# Patient Record
Sex: FEMALE | Race: BLACK OR AFRICAN AMERICAN | NOT HISPANIC OR LATINO | Employment: OTHER | RURAL
[De-identification: names, ages, dates, MRNs, and addresses within clinical notes are randomized per-mention and may not be internally consistent; named-entity substitution may affect disease eponyms.]

---

## 2019-08-02 LAB
CHOLEST SERPL-MSCNC: 233 MG/DL (ref 0–200)
HDLC SERPL-MCNC: 68 MG/DL
HEP C VIRUS AB: NONREACTIVE
LDLC SERPL CALC-MCNC: 147 MG/DL
TRIGL SERPL-MCNC: 88 MG/DL

## 2019-09-30 ENCOUNTER — HISTORICAL (OUTPATIENT)
Dept: ADMINISTRATIVE | Facility: HOSPITAL | Age: 69
End: 2019-09-30

## 2019-10-02 LAB
LAB AP GENERAL CAT - HISTORICAL: NORMAL
LAB AP INTERPRETATION/RESULT - HISTORICAL: NEGATIVE
LAB AP SPECIMEN ADEQUACY - HISTORICAL: NORMAL
LAB AP SPECIMEN SUBMITTED - HISTORICAL: NORMAL

## 2020-08-12 ENCOUNTER — HISTORICAL (OUTPATIENT)
Dept: ADMINISTRATIVE | Facility: HOSPITAL | Age: 70
End: 2020-08-12

## 2021-03-03 DIAGNOSIS — Z12.31 SCREENING MAMMOGRAM FOR HIGH-RISK PATIENT: Primary | ICD-10-CM

## 2021-04-20 ENCOUNTER — OFFICE VISIT (OUTPATIENT)
Dept: FAMILY MEDICINE | Facility: CLINIC | Age: 71
End: 2021-04-20
Payer: MEDICARE

## 2021-04-20 VITALS
DIASTOLIC BLOOD PRESSURE: 72 MMHG | TEMPERATURE: 98 F | OXYGEN SATURATION: 94 % | SYSTOLIC BLOOD PRESSURE: 122 MMHG | HEIGHT: 64 IN | WEIGHT: 157 LBS | BODY MASS INDEX: 26.8 KG/M2 | HEART RATE: 83 BPM

## 2021-04-20 DIAGNOSIS — M70.62 TROCHANTERIC BURSITIS OF BOTH HIPS: Primary | ICD-10-CM

## 2021-04-20 DIAGNOSIS — M70.61 TROCHANTERIC BURSITIS OF BOTH HIPS: Primary | ICD-10-CM

## 2021-04-20 DIAGNOSIS — I10 ESSENTIAL HYPERTENSION: ICD-10-CM

## 2021-04-20 PROCEDURE — 99213 OFFICE O/P EST LOW 20 MIN: CPT | Mod: ,,, | Performed by: FAMILY MEDICINE

## 2021-04-20 PROCEDURE — 99213 PR OFFICE/OUTPT VISIT, EST, LEVL III, 20-29 MIN: ICD-10-PCS | Mod: ,,, | Performed by: FAMILY MEDICINE

## 2021-04-20 RX ORDER — AMLODIPINE BESYLATE 10 MG/1
10 TABLET ORAL DAILY
COMMUNITY
Start: 2021-01-26 | End: 2021-04-20 | Stop reason: DRUGHIGH

## 2021-04-20 RX ORDER — CHLORTHALIDONE 25 MG/1
25 TABLET ORAL DAILY
Qty: 30 TABLET | Refills: 1 | Status: SHIPPED | OUTPATIENT
Start: 2021-04-20 | End: 2021-04-20

## 2021-04-20 RX ORDER — AMLODIPINE BESYLATE 5 MG/1
5 TABLET ORAL DAILY
Qty: 30 TABLET | Refills: 1 | Status: SHIPPED | OUTPATIENT
Start: 2021-04-20 | End: 2022-04-20

## 2021-04-20 RX ORDER — CHLORTHALIDONE 25 MG/1
25 TABLET ORAL DAILY
COMMUNITY
Start: 2021-03-09 | End: 2021-04-20 | Stop reason: SDUPTHER

## 2021-04-29 ENCOUNTER — TELEPHONE (OUTPATIENT)
Dept: FAMILY MEDICINE | Facility: CLINIC | Age: 71
End: 2021-04-29

## 2021-05-19 ENCOUNTER — HOSPITAL ENCOUNTER (OUTPATIENT)
Dept: RADIOLOGY | Facility: HOSPITAL | Age: 71
Discharge: HOME | End: 2021-05-19
Attending: ORTHOPAEDIC SURGERY
Payer: MEDICARE

## 2021-05-19 DIAGNOSIS — M25.552 BILATERAL HIP PAIN: ICD-10-CM

## 2021-05-19 DIAGNOSIS — M25.551 BILATERAL HIP PAIN: ICD-10-CM

## 2021-05-19 PROBLEM — M16.0 ARTHRITIS OF BOTH HIPS: Status: ACTIVE | Noted: 2021-05-19

## 2021-05-19 PROCEDURE — 73521 X-RAY EXAM HIPS BI 2 VIEWS: CPT | Mod: TC

## 2021-09-16 ENCOUNTER — PATIENT OUTREACH (OUTPATIENT)
Dept: FAMILY MEDICINE | Facility: CLINIC | Age: 71
End: 2021-09-16

## 2021-09-16 ENCOUNTER — TELEPHONE (OUTPATIENT)
Dept: FAMILY MEDICINE | Facility: CLINIC | Age: 71
End: 2021-09-16

## 2022-03-11 DIAGNOSIS — Z71.89 COMPLEX CARE COORDINATION: ICD-10-CM

## 2022-04-11 PROBLEM — M70.62 TROCHANTERIC BURSITIS OF BOTH HIPS: Status: ACTIVE | Noted: 2022-04-11

## 2022-04-11 PROBLEM — M70.61 TROCHANTERIC BURSITIS OF BOTH HIPS: Status: ACTIVE | Noted: 2022-04-11

## 2022-06-13 ENCOUNTER — OFFICE VISIT (OUTPATIENT)
Dept: FAMILY MEDICINE | Facility: CLINIC | Age: 72
End: 2022-06-13
Payer: COMMERCIAL

## 2022-06-13 VITALS
OXYGEN SATURATION: 99 % | WEIGHT: 146 LBS | SYSTOLIC BLOOD PRESSURE: 180 MMHG | TEMPERATURE: 98 F | DIASTOLIC BLOOD PRESSURE: 90 MMHG | BODY MASS INDEX: 24.92 KG/M2 | HEIGHT: 64 IN | HEART RATE: 81 BPM

## 2022-06-13 DIAGNOSIS — I10 ESSENTIAL HYPERTENSION: Primary | ICD-10-CM

## 2022-06-13 PROCEDURE — 3008F PR BODY MASS INDEX (BMI) DOCUMENTED: ICD-10-PCS | Mod: ,,, | Performed by: FAMILY MEDICINE

## 2022-06-13 PROCEDURE — 3008F BODY MASS INDEX DOCD: CPT | Mod: ,,, | Performed by: FAMILY MEDICINE

## 2022-06-13 PROCEDURE — 3080F DIAST BP >= 90 MM HG: CPT | Mod: ,,, | Performed by: FAMILY MEDICINE

## 2022-06-13 PROCEDURE — 99213 PR OFFICE/OUTPT VISIT, EST, LEVL III, 20-29 MIN: ICD-10-PCS | Mod: ,,, | Performed by: FAMILY MEDICINE

## 2022-06-13 PROCEDURE — 3077F SYST BP >= 140 MM HG: CPT | Mod: ,,, | Performed by: FAMILY MEDICINE

## 2022-06-13 PROCEDURE — 1159F PR MEDICATION LIST DOCUMENTED IN MEDICAL RECORD: ICD-10-PCS | Mod: ,,, | Performed by: FAMILY MEDICINE

## 2022-06-13 PROCEDURE — 3077F PR MOST RECENT SYSTOLIC BLOOD PRESSURE >= 140 MM HG: ICD-10-PCS | Mod: ,,, | Performed by: FAMILY MEDICINE

## 2022-06-13 PROCEDURE — 99213 OFFICE O/P EST LOW 20 MIN: CPT | Mod: ,,, | Performed by: FAMILY MEDICINE

## 2022-06-13 PROCEDURE — 1159F MED LIST DOCD IN RCRD: CPT | Mod: ,,, | Performed by: FAMILY MEDICINE

## 2022-06-13 PROCEDURE — 3080F PR MOST RECENT DIASTOLIC BLOOD PRESSURE >= 90 MM HG: ICD-10-PCS | Mod: ,,, | Performed by: FAMILY MEDICINE

## 2022-06-13 RX ORDER — CHLORTHALIDONE 25 MG/1
25 TABLET ORAL DAILY
Qty: 90 TABLET | Refills: 0 | Status: SHIPPED | OUTPATIENT
Start: 2022-06-13 | End: 2022-10-11 | Stop reason: SDUPTHER

## 2022-06-13 RX ORDER — AMLODIPINE BESYLATE 5 MG/1
5 TABLET ORAL DAILY
Qty: 90 TABLET | Refills: 0 | Status: SHIPPED | OUTPATIENT
Start: 2022-06-13 | End: 2022-10-11 | Stop reason: SDUPTHER

## 2022-06-13 RX ORDER — AMLODIPINE BESYLATE 10 MG/1
10 TABLET ORAL DAILY
Qty: 90 TABLET | Refills: 0 | Status: CANCELLED | OUTPATIENT
Start: 2022-06-13

## 2022-06-13 NOTE — PROGRESS NOTES
[facility]    Chief Complaint      Chief Complaint   Patient presents with    High Blood Pressure       History of Present Illness      Brandee Contreras is a 71 y.o. female with chronic conditions of HTN who presents today for med refills. Pt states she has been out of antihypertensives for several months. Previously prescribed amlodipine 5mg and chlorthalidone 25mg daily. States blood pressure was well controlled on medications. States there was some confusion with her insurance company and changing PCPs and so she never followed up.    Past Medical History:  Past Medical History:   Diagnosis Date    Hypertension        Past Surgical History:   has no past surgical history on file.    Social History:  Social History     Tobacco Use    Smoking status: Never Smoker    Smokeless tobacco: Never Used   Substance Use Topics    Alcohol use: Never    Drug use: Never       I personally reviewed all past medical, surgical, and social.     Review of Systems   Constitutional: Negative for fatigue and fever.   HENT: Negative for ear pain.    Eyes: Negative for pain and visual disturbance.   Respiratory: Negative for chest tightness and shortness of breath.    Cardiovascular: Negative for chest pain and leg swelling.   Gastrointestinal: Negative for abdominal pain.   Genitourinary: Negative for difficulty urinating.   Musculoskeletal: Negative for gait problem and myalgias.   Skin: Negative for rash.   Neurological: Negative for dizziness and light-headedness.   Hematological: Does not bruise/bleed easily.        Medications:  Outpatient Encounter Medications as of 6/13/2022   Medication Sig Dispense Refill    amLODIPine (NORVASC) 5 MG tablet Take 1 tablet (5 mg total) by mouth once daily. 90 tablet 0    chlorthalidone (HYGROTEN) 25 MG Tab Take 1 tablet (25 mg total) by mouth once daily. 90 tablet 0    [DISCONTINUED] amLODIPine (NORVASC) 10 MG tablet TAKE 1 TABLET BY MOUTH EVERY DAY (Patient not taking:  "Reported on 10/4/2021) 90 tablet 0     No facility-administered encounter medications on file as of 6/13/2022.       Allergies:  Review of patient's allergies indicates:  No Known Allergies    Health Maintenance:    There is no immunization history on file for this patient.   Health Maintenance   Topic Date Due    TETANUS VACCINE  Never done    DEXA Scan  Never done    Lipid Panel  08/02/2020    Mammogram  08/12/2021    Hepatitis C Screening  Completed        Physical Exam      Vital Signs  Temp: 98.1 °F (36.7 °C)  Temp src: Oral  Pulse: 81  SpO2: 99 %  BP: (!) 180/90  BP Location: Left arm  Patient Position: Sitting  Height and Weight  Height: 5' 4" (162.6 cm)  Weight: 66.2 kg (146 lb)  BSA (Calculated - sq m): 1.73 sq meters  BMI (Calculated): 25  Weight in (lb) to have BMI = 25: 145.3]    Physical Exam  Constitutional:       Appearance: Normal appearance.   HENT:      Head: Normocephalic and atraumatic.   Eyes:      Extraocular Movements: Extraocular movements intact.      Conjunctiva/sclera: Conjunctivae normal.      Pupils: Pupils are equal, round, and reactive to light.   Cardiovascular:      Rate and Rhythm: Normal rate and regular rhythm.      Pulses: Normal pulses.           Radial pulses are 2+ on the right side and 2+ on the left side.      Heart sounds: Normal heart sounds.   Pulmonary:      Effort: Pulmonary effort is normal.      Breath sounds: Normal breath sounds.   Abdominal:      Palpations: Abdomen is soft.      Tenderness: There is no abdominal tenderness.   Musculoskeletal:         General: Normal range of motion.      Right lower leg: No edema.      Left lower leg: No edema.   Skin:     General: Skin is warm and dry.      Findings: No rash.   Neurological:      General: No focal deficit present.      Mental Status: She is alert. Mental status is at baseline.   Psychiatric:         Mood and Affect: Mood normal.          Laboratory:  CBC:      CMP:        Invalid input(s): " CREATININ  LIPIDS:  Recent Labs   Lab 08/02/19  0000   HDL 68   Cholesterol 233 A   Triglycerides 88   LDL Calculated 147     TSH:      A1C:        Assessment/Plan     Brandee Contreras is a 71 y.o.female with:     1. Essential hypertension  - Will restart amlodipine and chlorthalidone  - Follow up in 1 month to reassess  - Will recheck labs at follow up     Total time spent face-to-face and non-face-to-face coordinating care for this encounter was: 20 min    Chronic conditions status updated as per HPI.  Other than changes above, cont current medications and maintain follow up with specialists.  Return to clinic in 1 month.    Alireza Guillen DO  [facility]

## 2022-07-14 ENCOUNTER — OFFICE VISIT (OUTPATIENT)
Dept: FAMILY MEDICINE | Facility: CLINIC | Age: 72
End: 2022-07-14
Payer: COMMERCIAL

## 2022-07-14 VITALS
BODY MASS INDEX: 25.16 KG/M2 | SYSTOLIC BLOOD PRESSURE: 120 MMHG | WEIGHT: 142 LBS | TEMPERATURE: 98 F | OXYGEN SATURATION: 99 % | HEART RATE: 85 BPM | DIASTOLIC BLOOD PRESSURE: 72 MMHG | HEIGHT: 63 IN

## 2022-07-14 DIAGNOSIS — U07.1 COVID-19: ICD-10-CM

## 2022-07-14 DIAGNOSIS — Z20.822 ENCOUNTER FOR LABORATORY TESTING FOR COVID-19 VIRUS: Primary | ICD-10-CM

## 2022-07-14 DIAGNOSIS — I10 ESSENTIAL HYPERTENSION: ICD-10-CM

## 2022-07-14 LAB
CTP QC/QA: YES
FLUAV AG NPH QL: NEGATIVE
FLUBV AG NPH QL: NEGATIVE
SARS-COV-2 AG RESP QL IA.RAPID: POSITIVE

## 2022-07-14 PROCEDURE — 1160F PR REVIEW ALL MEDS BY PRESCRIBER/CLIN PHARMACIST DOCUMENTED: ICD-10-PCS | Mod: ,,, | Performed by: FAMILY MEDICINE

## 2022-07-14 PROCEDURE — 3078F DIAST BP <80 MM HG: CPT | Mod: ,,, | Performed by: FAMILY MEDICINE

## 2022-07-14 PROCEDURE — 99213 OFFICE O/P EST LOW 20 MIN: CPT | Mod: ,,, | Performed by: FAMILY MEDICINE

## 2022-07-14 PROCEDURE — 3074F SYST BP LT 130 MM HG: CPT | Mod: ,,, | Performed by: FAMILY MEDICINE

## 2022-07-14 PROCEDURE — 99213 PR OFFICE/OUTPT VISIT, EST, LEVL III, 20-29 MIN: ICD-10-PCS | Mod: ,,, | Performed by: FAMILY MEDICINE

## 2022-07-14 PROCEDURE — 3008F PR BODY MASS INDEX (BMI) DOCUMENTED: ICD-10-PCS | Mod: ,,, | Performed by: FAMILY MEDICINE

## 2022-07-14 PROCEDURE — 3008F BODY MASS INDEX DOCD: CPT | Mod: ,,, | Performed by: FAMILY MEDICINE

## 2022-07-14 PROCEDURE — 3078F PR MOST RECENT DIASTOLIC BLOOD PRESSURE < 80 MM HG: ICD-10-PCS | Mod: ,,, | Performed by: FAMILY MEDICINE

## 2022-07-14 PROCEDURE — 3074F PR MOST RECENT SYSTOLIC BLOOD PRESSURE < 130 MM HG: ICD-10-PCS | Mod: ,,, | Performed by: FAMILY MEDICINE

## 2022-07-14 PROCEDURE — 1159F PR MEDICATION LIST DOCUMENTED IN MEDICAL RECORD: ICD-10-PCS | Mod: ,,, | Performed by: FAMILY MEDICINE

## 2022-07-14 PROCEDURE — 1159F MED LIST DOCD IN RCRD: CPT | Mod: ,,, | Performed by: FAMILY MEDICINE

## 2022-07-14 PROCEDURE — 87428 SARSCOV & INF VIR A&B AG IA: CPT | Mod: QW,,, | Performed by: FAMILY MEDICINE

## 2022-07-14 PROCEDURE — 87428 POCT SARS-COV2 (COVID) WITH FLU ANTIGEN: ICD-10-PCS | Mod: QW,,, | Performed by: FAMILY MEDICINE

## 2022-07-14 PROCEDURE — 1160F RVW MEDS BY RX/DR IN RCRD: CPT | Mod: ,,, | Performed by: FAMILY MEDICINE

## 2022-07-14 NOTE — PROGRESS NOTES
[facility]    Chief Complaint    No chief complaint on file.      History of Present Illness      Brandee Contreras is a 71 y.o. female with chronic conditions of HTN and osteoarthritis who presents today for routine follow up. Pt was restarted on antihypertensives last month after being off meds for several months. States she is tolerating w/o adverse side effects.     Today has c/o generalized fatigue and headache for the last 1.5 weeks. States she was having mild cough and congestion which have since resolved.    Past Medical History:  Past Medical History:   Diagnosis Date    Hypertension        Past Surgical History:   has no past surgical history on file.    Social History:  Social History     Tobacco Use    Smoking status: Never Smoker    Smokeless tobacco: Never Used   Substance Use Topics    Alcohol use: Never    Drug use: Never       I personally reviewed all past medical, surgical, and social.     Review of Systems   Constitutional: Positive for fatigue. Negative for fever.   HENT: Negative for ear pain.    Eyes: Negative for pain and visual disturbance.   Respiratory: Negative for chest tightness and shortness of breath.    Cardiovascular: Negative for chest pain and leg swelling.   Gastrointestinal: Negative for abdominal pain.   Genitourinary: Negative for difficulty urinating.   Musculoskeletal: Negative for gait problem and myalgias.   Skin: Negative for rash.   Neurological: Positive for headaches. Negative for dizziness and light-headedness.   Hematological: Does not bruise/bleed easily.        Medications:  Outpatient Encounter Medications as of 7/14/2022   Medication Sig Dispense Refill    amLODIPine (NORVASC) 5 MG tablet Take 1 tablet (5 mg total) by mouth once daily. 90 tablet 0    chlorthalidone (HYGROTEN) 25 MG Tab Take 1 tablet (25 mg total) by mouth once daily. 90 tablet 0     No facility-administered encounter medications on file as of 7/14/2022.       Allergies:  Review  "of patient's allergies indicates:  No Known Allergies    Health Maintenance:    There is no immunization history on file for this patient.   Health Maintenance   Topic Date Due    TETANUS VACCINE  Never done    DEXA Scan  Never done    Lipid Panel  08/02/2020    Mammogram  08/12/2021    Hepatitis C Screening  Completed        Physical Exam      Vital Signs  Temp: 98.2 °F (36.8 °C)  Temp src: Oral  Pulse: 85  SpO2: 99 %  BP: 120/72  BP Location: Left arm  Patient Position: Sitting  Height and Weight  Height: 5' 3" (160 cm)  Weight: 64.4 kg (142 lb)  BSA (Calculated - sq m): 1.69 sq meters  BMI (Calculated): 25.2  Weight in (lb) to have BMI = 25: 140.8]    Physical Exam  Constitutional:       Appearance: Normal appearance.   HENT:      Head: Normocephalic and atraumatic.   Eyes:      Extraocular Movements: Extraocular movements intact.      Conjunctiva/sclera: Conjunctivae normal.      Pupils: Pupils are equal, round, and reactive to light.   Cardiovascular:      Rate and Rhythm: Normal rate and regular rhythm.      Pulses: Normal pulses.           Radial pulses are 2+ on the right side and 2+ on the left side.      Heart sounds: Normal heart sounds.   Pulmonary:      Effort: Pulmonary effort is normal.      Breath sounds: Normal breath sounds.   Abdominal:      Palpations: Abdomen is soft.      Tenderness: There is no abdominal tenderness.   Musculoskeletal:         General: Normal range of motion.      Right lower leg: No edema.      Left lower leg: No edema.   Skin:     General: Skin is warm and dry.      Findings: No rash.   Neurological:      General: No focal deficit present.      Mental Status: She is alert. Mental status is at baseline.   Psychiatric:         Mood and Affect: Mood normal.          Laboratory:  CBC:      CMP:        Invalid input(s): CREATININ  LIPIDS:  Recent Labs   Lab 08/02/19  0000   HDL 68   Cholesterol 233 A   Triglycerides 88   LDL Calculated 147     TSH:      A1C:    "     Assessment/Plan     Brandee Contreras is a 71 y.o.female with:     1. Encounter for laboratory testing for COVID-19 virus  - POCT SARS-COV2 (COVID) with Flu Antigen    2. Essential hypertension  - Improved  - Continue current medications  - Recheck labs at follow up    3. COVID-19  - Covid positive, but outside window of treatment for Paxlovid  - Currently only having mild symptoms  - Continue OTC cough/cold medications     Total time spent face-to-face and non-face-to-face coordinating care for this encounter was: 20 min    Chronic conditions status updated as per HPI.  Other than changes above, cont current medications and maintain follow up with specialists.  Return to clinic in 3 month.    DO [facility]

## 2022-10-11 ENCOUNTER — OFFICE VISIT (OUTPATIENT)
Dept: FAMILY MEDICINE | Facility: CLINIC | Age: 72
End: 2022-10-11
Payer: COMMERCIAL

## 2022-10-11 ENCOUNTER — OFFICE VISIT (OUTPATIENT)
Dept: OBSTETRICS AND GYNECOLOGY | Facility: CLINIC | Age: 72
End: 2022-10-11
Payer: COMMERCIAL

## 2022-10-11 ENCOUNTER — HOSPITAL ENCOUNTER (OUTPATIENT)
Dept: RADIOLOGY | Facility: HOSPITAL | Age: 72
Discharge: HOME | End: 2022-10-11
Attending: OBSTETRICS & GYNECOLOGY
Payer: COMMERCIAL

## 2022-10-11 VITALS
SYSTOLIC BLOOD PRESSURE: 149 MMHG | WEIGHT: 141.38 LBS | HEART RATE: 77 BPM | BODY MASS INDEX: 25.05 KG/M2 | DIASTOLIC BLOOD PRESSURE: 79 MMHG

## 2022-10-11 VITALS
HEIGHT: 64 IN | OXYGEN SATURATION: 99 % | TEMPERATURE: 98 F | BODY MASS INDEX: 24.07 KG/M2 | SYSTOLIC BLOOD PRESSURE: 163 MMHG | HEART RATE: 78 BPM | WEIGHT: 141 LBS | DIASTOLIC BLOOD PRESSURE: 78 MMHG

## 2022-10-11 DIAGNOSIS — Z12.31 SCREENING MAMMOGRAM FOR HIGH-RISK PATIENT: ICD-10-CM

## 2022-10-11 DIAGNOSIS — Z78.0 ASYMPTOMATIC MENOPAUSAL STATE: ICD-10-CM

## 2022-10-11 DIAGNOSIS — I10 HYPERTENSION, UNSPECIFIED TYPE: Primary | ICD-10-CM

## 2022-10-11 DIAGNOSIS — N81.4 UTEROVAGINAL PROLAPSE: Primary | ICD-10-CM

## 2022-10-11 PROCEDURE — 3077F SYST BP >= 140 MM HG: CPT | Mod: ,,, | Performed by: NURSE PRACTITIONER

## 2022-10-11 PROCEDURE — 77067 SCR MAMMO BI INCL CAD: CPT | Mod: TC

## 2022-10-11 PROCEDURE — 3008F PR BODY MASS INDEX (BMI) DOCUMENTED: ICD-10-PCS | Mod: ,,, | Performed by: NURSE PRACTITIONER

## 2022-10-11 PROCEDURE — 3288F PR FALLS RISK ASSESSMENT DOCUMENTED: ICD-10-PCS | Mod: CPTII,,, | Performed by: OBSTETRICS & GYNECOLOGY

## 2022-10-11 PROCEDURE — 1101F PR PT FALLS ASSESS DOC 0-1 FALLS W/OUT INJ PAST YR: ICD-10-PCS | Mod: CPTII,,, | Performed by: OBSTETRICS & GYNECOLOGY

## 2022-10-11 PROCEDURE — 3008F PR BODY MASS INDEX (BMI) DOCUMENTED: ICD-10-PCS | Mod: CPTII,,, | Performed by: OBSTETRICS & GYNECOLOGY

## 2022-10-11 PROCEDURE — 3008F BODY MASS INDEX DOCD: CPT | Mod: CPTII,,, | Performed by: OBSTETRICS & GYNECOLOGY

## 2022-10-11 PROCEDURE — 1159F PR MEDICATION LIST DOCUMENTED IN MEDICAL RECORD: ICD-10-PCS | Mod: ,,, | Performed by: NURSE PRACTITIONER

## 2022-10-11 PROCEDURE — 1160F RVW MEDS BY RX/DR IN RCRD: CPT | Mod: ,,, | Performed by: NURSE PRACTITIONER

## 2022-10-11 PROCEDURE — 99213 OFFICE O/P EST LOW 20 MIN: CPT | Mod: ,,, | Performed by: NURSE PRACTITIONER

## 2022-10-11 PROCEDURE — 3288F FALL RISK ASSESSMENT DOCD: CPT | Mod: CPTII,,, | Performed by: OBSTETRICS & GYNECOLOGY

## 2022-10-11 PROCEDURE — 99213 PR OFFICE/OUTPT VISIT, EST, LEVL III, 20-29 MIN: ICD-10-PCS | Mod: ,,, | Performed by: NURSE PRACTITIONER

## 2022-10-11 PROCEDURE — 1101F PT FALLS ASSESS-DOCD LE1/YR: CPT | Mod: CPTII,,, | Performed by: OBSTETRICS & GYNECOLOGY

## 2022-10-11 PROCEDURE — 3078F PR MOST RECENT DIASTOLIC BLOOD PRESSURE < 80 MM HG: ICD-10-PCS | Mod: CPTII,,, | Performed by: OBSTETRICS & GYNECOLOGY

## 2022-10-11 PROCEDURE — 99203 OFFICE O/P NEW LOW 30 MIN: CPT | Mod: ,,, | Performed by: OBSTETRICS & GYNECOLOGY

## 2022-10-11 PROCEDURE — 3078F PR MOST RECENT DIASTOLIC BLOOD PRESSURE < 80 MM HG: ICD-10-PCS | Mod: ,,, | Performed by: NURSE PRACTITIONER

## 2022-10-11 PROCEDURE — 99203 PR OFFICE/OUTPT VISIT, NEW, LEVL III, 30-44 MIN: ICD-10-PCS | Mod: ,,, | Performed by: OBSTETRICS & GYNECOLOGY

## 2022-10-11 PROCEDURE — 1159F MED LIST DOCD IN RCRD: CPT | Mod: ,,, | Performed by: NURSE PRACTITIONER

## 2022-10-11 PROCEDURE — 3078F DIAST BP <80 MM HG: CPT | Mod: ,,, | Performed by: NURSE PRACTITIONER

## 2022-10-11 PROCEDURE — 3077F PR MOST RECENT SYSTOLIC BLOOD PRESSURE >= 140 MM HG: ICD-10-PCS | Mod: ,,, | Performed by: NURSE PRACTITIONER

## 2022-10-11 PROCEDURE — 3077F PR MOST RECENT SYSTOLIC BLOOD PRESSURE >= 140 MM HG: ICD-10-PCS | Mod: CPTII,,, | Performed by: OBSTETRICS & GYNECOLOGY

## 2022-10-11 PROCEDURE — 3008F BODY MASS INDEX DOCD: CPT | Mod: ,,, | Performed by: NURSE PRACTITIONER

## 2022-10-11 PROCEDURE — 3078F DIAST BP <80 MM HG: CPT | Mod: CPTII,,, | Performed by: OBSTETRICS & GYNECOLOGY

## 2022-10-11 PROCEDURE — 1160F PR REVIEW ALL MEDS BY PRESCRIBER/CLIN PHARMACIST DOCUMENTED: ICD-10-PCS | Mod: ,,, | Performed by: NURSE PRACTITIONER

## 2022-10-11 PROCEDURE — 3077F SYST BP >= 140 MM HG: CPT | Mod: CPTII,,, | Performed by: OBSTETRICS & GYNECOLOGY

## 2022-10-11 RX ORDER — AMLODIPINE BESYLATE 5 MG/1
5 TABLET ORAL DAILY
Qty: 90 TABLET | Refills: 1 | Status: SHIPPED | OUTPATIENT
Start: 2022-10-11 | End: 2023-05-09 | Stop reason: SDUPTHER

## 2022-10-11 RX ORDER — CHLORTHALIDONE 25 MG/1
25 TABLET ORAL DAILY
Qty: 90 TABLET | Refills: 1 | Status: SHIPPED | OUTPATIENT
Start: 2022-10-11 | End: 2023-05-09 | Stop reason: SDUPTHER

## 2022-10-11 NOTE — PROGRESS NOTES
History & Physical    SUBJECTIVE:     History of Present Illness:  Patient is a 71 y.o. female presents with unable to remove and replace pessary device. Pt denies having any bladder issues and denies wanting to come in to have it cleaned. Pt states she has never had a bone density scan, Mammo has been a couple of years ago and Colonoscopy was in 2019.    Chief Complaint   Patient presents with    New Patient     Pt has limited mobility and has a pessary.  She can no longer remove/replace it herself.       Review of patient's allergies indicates:  No Known Allergies    Current Outpatient Medications   Medication Sig Dispense Refill    amLODIPine (NORVASC) 5 MG tablet Take 1 tablet (5 mg total) by mouth once daily. 90 tablet 1    chlorthalidone (HYGROTEN) 25 MG Tab Take 1 tablet (25 mg total) by mouth once daily. 90 tablet 1     No current facility-administered medications for this visit.       Past Medical History:   Diagnosis Date    Hypertension      History reviewed. No pertinent surgical history.  History reviewed. No pertinent family history.  Social History     Tobacco Use    Smoking status: Never    Smokeless tobacco: Never   Substance Use Topics    Alcohol use: Never    Drug use: Never        Review of Systems:  Review of Systems   Constitutional:  Negative for appetite change, chills, fatigue and fever.   HENT: Negative.     Eyes: Negative.    Respiratory:  Negative for cough, chest tightness and shortness of breath.    Cardiovascular:  Negative for chest pain, palpitations and leg swelling.   Gastrointestinal:  Negative for abdominal distention, abdominal pain, blood in stool, constipation, diarrhea, nausea and vomiting.   Endocrine: Negative for cold intolerance, heat intolerance, polydipsia, polyphagia and polyuria.   Genitourinary:  Negative for difficulty urinating, dyspareunia, dysuria, flank pain, frequency, pelvic pain, urgency, vaginal bleeding, vaginal discharge and vaginal pain.    Musculoskeletal: Negative.    Skin: Negative.    Neurological: Negative.    Psychiatric/Behavioral: Negative.  Negative for agitation, behavioral problems, confusion and sleep disturbance. The patient is not nervous/anxious.      OBJECTIVE:     Vital Signs (Most Recent)  Pulse: 77 (10/11/22 1020)  BP: (!) 149/79 (10/11/22 1020)     64.1 kg (141 lb 6.4 oz)     Physical Exam:  Physical Exam  Vitals reviewed. Exam conducted with a chaperone present.   Constitutional:       Appearance: Normal appearance.   HENT:      Head: Normocephalic and atraumatic.      Mouth/Throat:      Mouth: Mucous membranes are moist.   Eyes:      Extraocular Movements: Extraocular movements intact.      Pupils: Pupils are equal, round, and reactive to light.   Cardiovascular:      Rate and Rhythm: Normal rate and regular rhythm.      Pulses: Normal pulses.      Heart sounds: Normal heart sounds.   Pulmonary:      Effort: Pulmonary effort is normal.      Breath sounds: Normal breath sounds.   Abdominal:      General: Abdomen is flat. Bowel sounds are normal.      Palpations: Abdomen is soft.   Musculoskeletal:         General: Normal range of motion.      Cervical back: Normal range of motion.   Skin:     General: Skin is warm and dry.   Neurological:      General: No focal deficit present.      Mental Status: She is alert and oriented to person, place, and time.   Psychiatric:         Mood and Affect: Mood normal.         Behavior: Behavior normal.         Thought Content: Thought content normal.         Judgment: Judgment normal.         ASSESSMENT/PLAN:   Brandee was seen today for new patient.    Diagnoses and all orders for this visit:    Uterovaginal prolapse    Asymptomatic menopausal state  -     DXA Bone Density Spine And Hip; Future    Screening mammogram for high-risk patient  -     Mammo Digital Screening Bilat; Future      PLAN:Plan      See above  RTC in 4 weeks for annual and pessary cleaning

## 2022-10-11 NOTE — PROGRESS NOTES
[facility]          Chief Complaint        Chief Complaint   Patient presents with    Follow-up             History of Present Illness           Brandee Contreras is a 71 y.o. female with chronic conditions of HTN who presents today for routine follow up.  The pt is doing well overall with no new complaints or problems.  She sees Dr. Gomez gynecologist as recently had her DEXA scan ordered.  She is out of her BP meds.          Past Medical History:     Past Medical History:   Diagnosis Date    Hypertension              Past Surgical History:      has no past surgical history on file.          Social History:     Social History     Tobacco Use    Smoking status: Never    Smokeless tobacco: Never   Substance Use Topics    Alcohol use: Never    Drug use: Never             I personally reviewed all past medical, surgical, and social.           Review of Systems   Constitutional: Negative.    HENT: Negative.     Eyes: Negative.    Respiratory: Negative.     Cardiovascular: Negative.    Gastrointestinal: Negative.    Endocrine: Negative.    Genitourinary: Negative.    Musculoskeletal: Negative.    Skin: Negative.    Allergic/Immunologic: Negative.    Neurological: Negative.    Psychiatric/Behavioral: Negative.              Medications:     Outpatient Encounter Medications as of 10/11/2022   Medication Sig Dispense Refill    amLODIPine (NORVASC) 5 MG tablet Take 1 tablet (5 mg total) by mouth once daily. 90 tablet 1    chlorthalidone (HYGROTEN) 25 MG Tab Take 1 tablet (25 mg total) by mouth once daily. 90 tablet 1    [DISCONTINUED] amLODIPine (NORVASC) 5 MG tablet Take 1 tablet (5 mg total) by mouth once daily. (Patient not taking: Reported on 10/11/2022) 90 tablet 0    [DISCONTINUED] chlorthalidone (HYGROTEN) 25 MG Tab Take 1 tablet (25 mg total) by mouth once daily. (Patient not taking: Reported on 10/11/2022) 90 tablet 0     No facility-administered encounter medications on file as of 10/11/2022.          "    Allergies:     Review of patient's allergies indicates:  No Known Allergies          Health Maintenance:       There is no immunization history on file for this patient.      Health Maintenance   Topic Date Due    TETANUS VACCINE  Never done    DEXA Scan  Never done    Lipid Panel  08/02/2020    Mammogram  10/11/2023    Hepatitis C Screening  Completed              Physical Exam           Vital Signs  Temp: 98.3 °F (36.8 °C)  Temp src: Oral  Pulse: 78  SpO2: 99 %  BP: (!) 163/78  BP Location: Left arm  Patient Position: Sitting  Height and Weight  Height: 5' 4" (162.6 cm)  Weight: 64 kg (141 lb)  BSA (Calculated - sq m): 1.7 sq meters  BMI (Calculated): 24.2  Weight in (lb) to have BMI = 25: 145.3]          Physical Exam  Vitals and nursing note reviewed.   Constitutional:       General: She is not in acute distress.     Appearance: Normal appearance. She is not ill-appearing.   HENT:      Head: Normocephalic.      Right Ear: External ear normal.      Left Ear: External ear normal.      Nose: Nose normal. No congestion or rhinorrhea.      Mouth/Throat:      Mouth: Mucous membranes are moist.   Eyes:      Pupils: Pupils are equal, round, and reactive to light.   Cardiovascular:      Rate and Rhythm: Normal rate and regular rhythm.      Pulses: Normal pulses.      Heart sounds: Normal heart sounds. No murmur heard.    No friction rub. No gallop.   Pulmonary:      Effort: Pulmonary effort is normal. No respiratory distress.      Breath sounds: Normal breath sounds. No stridor. No wheezing, rhonchi or rales.   Abdominal:      General: There is no distension.      Palpations: Abdomen is soft.   Musculoskeletal:         General: Normal range of motion.      Cervical back: Normal range of motion and neck supple. No rigidity or tenderness.   Skin:     General: Skin is warm and dry.      Coloration: Skin is not jaundiced or pale.   Neurological:      General: No focal deficit present.      Mental Status: She is alert and " oriented to person, place, and time. Mental status is at baseline.      Cranial Nerves: No cranial nerve deficit.      Sensory: No sensory deficit.      Motor: No weakness.      Coordination: Coordination normal.      Gait: Gait normal.   Psychiatric:         Mood and Affect: Mood normal.         Behavior: Behavior normal.         Thought Content: Thought content normal.         Judgment: Judgment normal.              Laboratory:     CBC:            CMP:           Invalid input(s): CREATININ     LIPIDS:            TSH:            A1C:                 Assessment/Plan          Brandee Contreras is a 71 y.o.female with:           1. Hypertension, unspecified type  - amLODIPine (NORVASC) 5 MG tablet; Take 1 tablet (5 mg total) by mouth once daily.  Dispense: 90 tablet; Refill: 1  - chlorthalidone (HYGROTEN) 25 MG Tab; Take 1 tablet (25 mg total) by mouth once daily.  Dispense: 90 tablet; Refill: 1       -cont BP meds back; cont this regimen; dash diet, reg exercise recs; advised that pt schedule an AWV appt    Total time spent face-to-face and non-face-to-face coordinating care for this encounter was: 25 min          Chronic conditions status updated as per HPI.  Other than changes above, cont current medications and maintain follow up with specialists.  Return to clinic in 6 months.          MEME Hess     [facility]

## 2022-10-26 ENCOUNTER — HOSPITAL ENCOUNTER (OUTPATIENT)
Dept: RADIOLOGY | Facility: HOSPITAL | Age: 72
Discharge: HOME | End: 2022-10-26
Attending: OBSTETRICS & GYNECOLOGY
Payer: COMMERCIAL

## 2022-10-26 DIAGNOSIS — Z78.0 ASYMPTOMATIC MENOPAUSAL STATE: ICD-10-CM

## 2022-10-26 PROCEDURE — 77080 DEXA BONE DENSITY SPINE HIP: ICD-10-PCS | Mod: 26,,, | Performed by: RADIOLOGY

## 2022-10-26 PROCEDURE — 77080 DXA BONE DENSITY AXIAL: CPT | Mod: 26,,, | Performed by: RADIOLOGY

## 2022-10-26 PROCEDURE — 77080 DXA BONE DENSITY AXIAL: CPT | Mod: TC

## 2022-11-07 ENCOUNTER — OFFICE VISIT (OUTPATIENT)
Dept: OBSTETRICS AND GYNECOLOGY | Facility: CLINIC | Age: 72
End: 2022-11-07
Payer: COMMERCIAL

## 2022-11-07 VITALS
WEIGHT: 137.63 LBS | DIASTOLIC BLOOD PRESSURE: 72 MMHG | HEART RATE: 78 BPM | SYSTOLIC BLOOD PRESSURE: 128 MMHG | BODY MASS INDEX: 23.62 KG/M2

## 2022-11-07 DIAGNOSIS — Z12.4 CERVICAL CANCER SCREENING: Primary | ICD-10-CM

## 2022-11-07 DIAGNOSIS — M81.0 OSTEOPOROSIS, UNSPECIFIED OSTEOPOROSIS TYPE, UNSPECIFIED PATHOLOGICAL FRACTURE PRESENCE: ICD-10-CM

## 2022-11-07 DIAGNOSIS — M16.0 ARTHRITIS OF BOTH HIPS: ICD-10-CM

## 2022-11-07 DIAGNOSIS — Z01.419 ROUTINE GYNECOLOGICAL EXAMINATION: ICD-10-CM

## 2022-11-07 DIAGNOSIS — M87.9 OSTEONECROSIS: ICD-10-CM

## 2022-11-07 PROCEDURE — G0101 PR CA SCREEN;PELVIC/BREAST EXAM: ICD-10-PCS | Mod: ,,, | Performed by: OBSTETRICS & GYNECOLOGY

## 2022-11-07 PROCEDURE — 3288F PR FALLS RISK ASSESSMENT DOCUMENTED: ICD-10-PCS | Mod: CPTII,,, | Performed by: OBSTETRICS & GYNECOLOGY

## 2022-11-07 PROCEDURE — 3288F FALL RISK ASSESSMENT DOCD: CPT | Mod: CPTII,,, | Performed by: OBSTETRICS & GYNECOLOGY

## 2022-11-07 PROCEDURE — 1159F PR MEDICATION LIST DOCUMENTED IN MEDICAL RECORD: ICD-10-PCS | Mod: CPTII,,, | Performed by: OBSTETRICS & GYNECOLOGY

## 2022-11-07 PROCEDURE — 87624 HPV HI-RISK TYP POOLED RSLT: CPT | Mod: ,,, | Performed by: CLINICAL MEDICAL LABORATORY

## 2022-11-07 PROCEDURE — 3074F SYST BP LT 130 MM HG: CPT | Mod: CPTII,,, | Performed by: OBSTETRICS & GYNECOLOGY

## 2022-11-07 PROCEDURE — 3078F DIAST BP <80 MM HG: CPT | Mod: CPTII,,, | Performed by: OBSTETRICS & GYNECOLOGY

## 2022-11-07 PROCEDURE — 1101F PT FALLS ASSESS-DOCD LE1/YR: CPT | Mod: CPTII,,, | Performed by: OBSTETRICS & GYNECOLOGY

## 2022-11-07 PROCEDURE — 3074F PR MOST RECENT SYSTOLIC BLOOD PRESSURE < 130 MM HG: ICD-10-PCS | Mod: CPTII,,, | Performed by: OBSTETRICS & GYNECOLOGY

## 2022-11-07 PROCEDURE — 1101F PR PT FALLS ASSESS DOC 0-1 FALLS W/OUT INJ PAST YR: ICD-10-PCS | Mod: CPTII,,, | Performed by: OBSTETRICS & GYNECOLOGY

## 2022-11-07 PROCEDURE — 3078F PR MOST RECENT DIASTOLIC BLOOD PRESSURE < 80 MM HG: ICD-10-PCS | Mod: CPTII,,, | Performed by: OBSTETRICS & GYNECOLOGY

## 2022-11-07 PROCEDURE — 3008F BODY MASS INDEX DOCD: CPT | Mod: CPTII,,, | Performed by: OBSTETRICS & GYNECOLOGY

## 2022-11-07 PROCEDURE — 1159F MED LIST DOCD IN RCRD: CPT | Mod: CPTII,,, | Performed by: OBSTETRICS & GYNECOLOGY

## 2022-11-07 PROCEDURE — 88142 CYTOPATH C/V THIN LAYER: CPT | Mod: GCY | Performed by: OBSTETRICS & GYNECOLOGY

## 2022-11-07 PROCEDURE — 87624 HUMAN PAPILLOMAVIRUS (HPV): ICD-10-PCS | Mod: ,,, | Performed by: CLINICAL MEDICAL LABORATORY

## 2022-11-07 PROCEDURE — G0101 CA SCREEN;PELVIC/BREAST EXAM: HCPCS | Mod: ,,, | Performed by: OBSTETRICS & GYNECOLOGY

## 2022-11-07 PROCEDURE — 3008F PR BODY MASS INDEX (BMI) DOCUMENTED: ICD-10-PCS | Mod: CPTII,,, | Performed by: OBSTETRICS & GYNECOLOGY

## 2022-11-07 RX ORDER — IBANDRONATE SODIUM 150 MG/1
150 TABLET, FILM COATED ORAL
Qty: 1 TABLET | Refills: 11 | Status: SHIPPED | OUTPATIENT
Start: 2022-11-07 | End: 2023-11-13 | Stop reason: SDUPTHER

## 2022-11-07 NOTE — PROGRESS NOTES
CC: Well woman exam    Brandee Contreras is a 71 y.o. female No obstetric history on file. presents for well woman exam.    LMP: No LMP recorded. Patient is postmenopausal..    Last mammogram: 10/11/2022  Last Colonoscopy: 08/20/2019  Bone Density: 10/26/2022    Denies issues, problems, or complaints.     Past Medical History:   Diagnosis Date    Hypertension      History reviewed. No pertinent surgical history.  Social History     Socioeconomic History    Marital status:    Tobacco Use    Smoking status: Never     Passive exposure: Never    Smokeless tobacco: Never   Substance and Sexual Activity    Alcohol use: Never    Drug use: Never     History reviewed. No pertinent family history.  OB History    No obstetric history on file.         /72   Pulse 78   Wt 62.4 kg (137 lb 9.6 oz)   BMI 23.62 kg/m²       ROS:  GENERAL: Denies weight gain or weight loss. Feeling well overall.   SKIN: Denies rash or lesions.   HEAD: Denies head injury or headache.   NODES: Denies enlarged lymph nodes.   CHEST: Denies chest pain or shortness of breath.   CARDIOVASCULAR: Denies palpitations or left sided chest pain.   ABDOMEN: No abdominal pain, constipation, diarrhea, nausea, vomiting or rectal bleeding.   URINARY: No frequency, dysuria, hematuria, or burning on urination.  REPRODUCTIVE: See HPI.   BREASTS: The patient performs breast self-examination and denies pain, lumps, or nipple discharge.   HEMATOLOGIC: No easy bruisability or excessive bleeding.   MUSCULOSKELETAL: Denies joint pain or swelling.   NEUROLOGIC: Denies syncope or weakness.   PSYCHIATRIC: Denies depression, anxiety or mood swings.    PHYSICAL EXAM:  APPEARANCE: Well nourished, well developed, in no acute distress.  AFFECT: WNL, alert and oriented x 3  SKIN: No acne or hirsutism  NECK: Neck symmetric without masses or thyromegaly  NODES: No inguinal, cervical, axillary, or femoral lymph node enlargement  CHEST: Good respiratory effect  ABDOMEN:  Soft.  No tenderness or masses.  No hepatosplenomegaly.  No hernias.  BREASTS: Symmetrical, no skin changes or visible lesions.  No palpable masses, nipple discharge bilaterally.  PELVIC: Normal external genitalia without lesions.  Normal hair distribution.  Adequate perineal body, normal urethral meatus.  Vagina moist and well rugated without lesions or discharge.  Cervix pink, without lesions, discharge or tenderness.  No significant cystocele or rectocele.  Bimanual exam shows uterus to be normal size, regular, mobile and nontender.  Adnexa without masses or tenderness.    EXTREMITIES: No edema.    Cervical cancer screening  -     ThinPrep Pap Test    Routine gynecological examination  -     ThinPrep Pap Test    Osteonecrosis  -     X-Ray Hip 2 or 3 views Left (with Pelvis when performed); Future; Expected date: 11/07/2022  -     Ambulatory referral/consult to Orthopedics; Future; Expected date: 11/14/2022    Osteoporosis, unspecified osteoporosis type, unspecified pathological fracture presence  -     ibandronate (BONIVA) 150 mg tablet; Take 1 tablet (150 mg total) by mouth every 30 days.  Dispense: 1 tablet; Refill: 11  -     Ambulatory referral/consult to Orthopedics; Future; Expected date: 11/14/2022    Arthritis of both hips  -     Ambulatory referral/consult to Orthopedics; Future; Expected date: 11/14/2022          Patient was counseled today on A.C.S. Pap guidelines and recommendations for yearly pelvic exams, mammograms and monthly self breast exams; to see her PCP for other health maintenance.   Exercise regimen encouraged  Healthy food choices encouraged  Questions answered to desired level of satisfaction  Verbalized understanding to all information and instructions    Follow up in about 1 year (around 11/7/2023) for Annual Exam w/out PAP.      Dirk Garrison M.D., FCOG    OB/GYN

## 2022-11-09 ENCOUNTER — TELEPHONE (OUTPATIENT)
Dept: OBSTETRICS AND GYNECOLOGY | Facility: CLINIC | Age: 72
End: 2022-11-09
Payer: COMMERCIAL

## 2022-11-09 DIAGNOSIS — Z71.89 COMPLEX CARE COORDINATION: ICD-10-CM

## 2022-11-09 NOTE — TELEPHONE ENCOUNTER
----- Message from Yemi Pan sent at 11/8/2022 11:00 AM CST -----  Patient called to get prescription to [facility]. She can be reached @ [phone]

## 2022-11-09 NOTE — TELEPHONE ENCOUNTER
----- Message from Lily Tabares sent at 11/8/2022  4:17 PM CST -----  Presc that was sent yesterday needs to go to [facility] dont have pres

## 2022-11-10 LAB
GH SERPL-MCNC: NORMAL NG/ML
INSULIN SERPL-ACNC: NORMAL U[IU]/ML
LAB AP CLINICAL INFORMATION: NORMAL
LAB AP GYN INTERPRETATION: NEGATIVE
LAB AP PAP DISCLAIMER COMMENTS: NORMAL
RENIN PLAS-CCNC: NORMAL NG/ML/H

## 2022-11-12 LAB
HPV 16: NEGATIVE
HPV 18: NEGATIVE
HPV OTHER: NEGATIVE

## 2022-11-28 ENCOUNTER — HOSPITAL ENCOUNTER (OUTPATIENT)
Dept: RADIOLOGY | Facility: HOSPITAL | Age: 72
Discharge: HOME | End: 2022-11-28
Attending: ORTHOPAEDIC SURGERY
Payer: COMMERCIAL

## 2022-11-28 DIAGNOSIS — M70.61 TROCHANTERIC BURSITIS OF BOTH HIPS: ICD-10-CM

## 2022-11-28 DIAGNOSIS — M70.62 TROCHANTERIC BURSITIS OF BOTH HIPS: ICD-10-CM

## 2022-11-28 PROCEDURE — 73522 X-RAY EXAM HIPS BI 3-4 VIEWS: CPT | Mod: TC

## 2023-02-24 NOTE — PROGRESS NOTES
[facility]      PATIENT NAME: Brandee Contreras   : 1950    AGE: 72 y.o. DATE: 2023   MRN: [id number]        Reason for Visit / Chief Complaint: Medicare AWV ( Subsequent [employer] AWV )        Brandee Contreras presents for an Subsequent [employer] AWV today.     The following components were reviewed and updated:    Medical/Social/Family History:  Past Medical History:   Diagnosis Date    Hypertension         Family History   Problem Relation Age of Onset    Hypertension Maternal Grandmother         History reviewed. No pertinent surgical history.    Social History     Tobacco Use   Smoking Status Every Day    Packs/day: 0.25    Years: 2.00    Pack years: 0.50    Types: Cigarettes    Start date: 2021    Passive exposure: Current   Smokeless Tobacco Never       Social History     Substance and Sexual Activity   Alcohol Use Never         Allergies and Current Medications   Review of patient's allergies indicates:  No Known Allergies    Current Outpatient Medications:     amLODIPine (NORVASC) 5 MG tablet, Take 1 tablet (5 mg total) by mouth once daily., Disp: 90 tablet, Rfl: 1    celecoxib (CELEBREX) 200 MG capsule, Take 200 mg by mouth., Disp: , Rfl:     chlorthalidone (HYGROTEN) 25 MG Tab, Take 1 tablet (25 mg total) by mouth once daily., Disp: 90 tablet, Rfl: 1    ibandronate (BONIVA) 150 mg tablet, Take 1 tablet (150 mg total) by mouth every 30 days., Disp: 1 tablet, Rfl: 11      Health Risk Assessment   Fall Risk: no   Obesity: BMI Body mass index is 23.91 kg/m².   Advance Directive:  Does not have an advanced directive. Verbal information and written information provided on today's AVS.   Depression: PHQ9- 0   HTN:  DASH diet, exercise, weight management, med compliance, home BP monitoring, and follow-up discussed.   STI: not at risk    Statin Use: no      Health Maintenance   Last eye exam:  dr. Levine    Last CV screen with lipids: available    Diabetes  screening with fasting glucose or A1c: available    Colonoscopy: 08/20/2019 repeat 10 years    Flu Vaccine: decliness   Pneumonia vaccines: declines   COVID vaccine: declines   Hep B vaccine: na   DEXA: 10/26/2022   Last pap/pelvic: 11/07/2022   Last Mammogram: 10/11/2022   AAA screening: na   HIV Screeing: declines  Hepatitis C Screen: 08/02/2019  Low Dose CT Scan: declines     Health Maintenance Topics with due status: Not Due       Topic Last Completion Date    Colorectal Cancer Screening 08/20/2019    Mammogram 10/11/2022    DEXA Scan 10/26/2022     Health Maintenance Due   Topic Date Due    COVID-19 Vaccine (1) Never done    Pneumococcal Vaccines (Age 65+) (1 - PCV) Never done    TETANUS VACCINE  Never done    Shingles Vaccine (1 of 2) Never done    Lipid Panel  08/02/2020    Influenza Vaccine (1) Never done         Lab results available in Epic or see dates from [facility] above:   Lab Results   Component Value Date    CHOL 233 (A) 08/02/2019     Lab Results   Component Value Date    HDL 68 08/02/2019     Lab Results   Component Value Date    LDLCALC 147 08/02/2019     Lab Results   Component Value Date    TRIG 88 08/02/2019         No results found for: LABA1C, HGBA1C    No results found for: NA, K, CL, CO2, GLU, BUN, CREATININE, CALCIUM, PROT, ALBUMIN, BILITOT, ALKPHOS, AST, ALT, ANIONGAP, ESTGFRAFRICA, EGFRNONAA        Incontinence  Bowel: no  Bladder: no      Care Team  Dr. Dirk Gomez pcp                 TIFF Crocker fnp                 Dr. Levine ophthamology]                            Dr. Woods pain medicine      Completed Assessments for Annual Wellness visit within the encounter summary    The following assessments were completed & reviewed:  Depression Screening  Cognitive function Screening  Timed Get Up Test  Whisper Test  Vision Screen  Health Risk Assessment  Checklist of ADLs and IADLs        Objective  Vitals:    02/27/23 1326   BP: 120/68   Pulse: 78   Resp: 14   Temp: 98.6 °F (37 °C)   SpO2: 99%  "  Weight: 61.2 kg (135 lb)   Height: 5' 3" (1.6 m)   PainSc: 0-No pain      Body mass index is 23.91 kg/m².  Ideal body weight: 52.4 kg (115 lb 8.3 oz)       Physical Exam      Assessment:     1. Encounter for initial annual wellness visit (AWV) in Medicare patient    2. Hypertension, unspecified type    3. Screening for lipid disorders    4. Screening for diabetes mellitus       Problem List Items Addressed This Visit          Cardiac/Vascular    Hypertension     Other Visit Diagnoses       Encounter for initial annual wellness visit (AWV) in Medicare patient    -  Primary    Screening for lipid disorders        Screening for diabetes mellitus                  Plan:    Referrals:        Advised to call office if does not hear from anyone with referral appt within 2-3 weeks to check on status of referral. Voiced understanding.      Discussed and provided with a screening schedule and personal prevention plan in accordance with USPSTF age appropriate recommendations and Medicare screening guidelines.   Education, counseling, and referrals were provided as needed.  After Visit Summary printed and given to patient which includes written education and a list of any referrals if indicated.     Education including diet, exercise, falls, preventive health care for older adults and advanced directives discussed with patient and patient verbalized understanding.    Assistance with smoking cessation was offered, including:    [x]  Medications  [x]  Counseling  [x]  Printed Information on Smoking Cessation  []  Referral to a Smoking Cessation Program    Patient was counseled regarding smoking for 3-10 minutes.    F/u plan for yearly AWV.    Signature:     "

## 2023-02-27 ENCOUNTER — OFFICE VISIT (OUTPATIENT)
Dept: FAMILY MEDICINE | Facility: CLINIC | Age: 73
End: 2023-02-27
Payer: COMMERCIAL

## 2023-02-27 VITALS
BODY MASS INDEX: 23.92 KG/M2 | HEIGHT: 63 IN | SYSTOLIC BLOOD PRESSURE: 120 MMHG | WEIGHT: 135 LBS | DIASTOLIC BLOOD PRESSURE: 68 MMHG | RESPIRATION RATE: 14 BRPM | TEMPERATURE: 99 F | HEART RATE: 78 BPM | OXYGEN SATURATION: 99 %

## 2023-02-27 DIAGNOSIS — I10 HYPERTENSION, UNSPECIFIED TYPE: ICD-10-CM

## 2023-02-27 DIAGNOSIS — Z00.00 ENCOUNTER FOR INITIAL ANNUAL WELLNESS VISIT (AWV) IN MEDICARE PATIENT: Primary | ICD-10-CM

## 2023-02-27 DIAGNOSIS — Z13.1 SCREENING FOR DIABETES MELLITUS: ICD-10-CM

## 2023-02-27 DIAGNOSIS — Z13.220 SCREENING FOR LIPID DISORDERS: ICD-10-CM

## 2023-02-27 PROCEDURE — 3074F PR MOST RECENT SYSTOLIC BLOOD PRESSURE < 130 MM HG: ICD-10-PCS | Mod: ,,, | Performed by: NURSE PRACTITIONER

## 2023-02-27 PROCEDURE — 1126F AMNT PAIN NOTED NONE PRSNT: CPT | Mod: ,,, | Performed by: NURSE PRACTITIONER

## 2023-02-27 PROCEDURE — 3078F DIAST BP <80 MM HG: CPT | Mod: ,,, | Performed by: NURSE PRACTITIONER

## 2023-02-27 PROCEDURE — 3078F PR MOST RECENT DIASTOLIC BLOOD PRESSURE < 80 MM HG: ICD-10-PCS | Mod: ,,, | Performed by: NURSE PRACTITIONER

## 2023-02-27 PROCEDURE — 3074F SYST BP LT 130 MM HG: CPT | Mod: ,,, | Performed by: NURSE PRACTITIONER

## 2023-02-27 PROCEDURE — 3288F FALL RISK ASSESSMENT DOCD: CPT | Mod: ,,, | Performed by: NURSE PRACTITIONER

## 2023-02-27 PROCEDURE — 1159F MED LIST DOCD IN RCRD: CPT | Mod: ,,, | Performed by: NURSE PRACTITIONER

## 2023-02-27 PROCEDURE — G0439 PR MEDICARE ANNUAL WELLNESS SUBSEQUENT VISIT: ICD-10-PCS | Mod: ,,, | Performed by: NURSE PRACTITIONER

## 2023-02-27 PROCEDURE — 1101F PT FALLS ASSESS-DOCD LE1/YR: CPT | Mod: ,,, | Performed by: NURSE PRACTITIONER

## 2023-02-27 PROCEDURE — 3288F PR FALLS RISK ASSESSMENT DOCUMENTED: ICD-10-PCS | Mod: ,,, | Performed by: NURSE PRACTITIONER

## 2023-02-27 PROCEDURE — G0439 PPPS, SUBSEQ VISIT: HCPCS | Mod: ,,, | Performed by: NURSE PRACTITIONER

## 2023-02-27 PROCEDURE — 1101F PR PT FALLS ASSESS DOC 0-1 FALLS W/OUT INJ PAST YR: ICD-10-PCS | Mod: ,,, | Performed by: NURSE PRACTITIONER

## 2023-02-27 PROCEDURE — 3008F PR BODY MASS INDEX (BMI) DOCUMENTED: ICD-10-PCS | Mod: ,,, | Performed by: NURSE PRACTITIONER

## 2023-02-27 PROCEDURE — 1159F PR MEDICATION LIST DOCUMENTED IN MEDICAL RECORD: ICD-10-PCS | Mod: ,,, | Performed by: NURSE PRACTITIONER

## 2023-02-27 PROCEDURE — 1126F PR PAIN SEVERITY QUANTIFIED, NO PAIN PRESENT: ICD-10-PCS | Mod: ,,, | Performed by: NURSE PRACTITIONER

## 2023-02-27 PROCEDURE — 3008F BODY MASS INDEX DOCD: CPT | Mod: ,,, | Performed by: NURSE PRACTITIONER

## 2023-02-27 RX ORDER — CELECOXIB 200 MG/1
200 CAPSULE ORAL
COMMUNITY
Start: 2023-02-23 | End: 2023-05-13

## 2023-02-27 NOTE — PATIENT INSTRUCTIONS
Counseling and Referral of Other Preventative  (Italic type indicates deductible and co-insurance are waived)    Patient Name: Brandee Contreras  Today's Date: 2/27/2023    Health Maintenance         Date Due Completion Date    COVID-19 Vaccine (1) Never done ---    Pneumococcal Vaccines (Age 65+) (1 - PCV) Never done ---    TETANUS VACCINE Never done ---    Shingles Vaccine (1 of 2) Never done ---    Lipid Panel 08/02/2020 8/2/2019    Influenza Vaccine (1) Never done ---    Mammogram 10/11/2023 10/11/2022    DEXA Scan 10/26/2025 10/26/2022    Colorectal Cancer Screening 08/20/2029 8/20/2019          No orders of the defined types were placed in this encounter.

## 2023-04-14 ENCOUNTER — OFFICE VISIT (OUTPATIENT)
Dept: FAMILY MEDICINE | Facility: CLINIC | Age: 73
End: 2023-04-14
Payer: COMMERCIAL

## 2023-04-14 VITALS
TEMPERATURE: 98 F | WEIGHT: 139 LBS | SYSTOLIC BLOOD PRESSURE: 122 MMHG | DIASTOLIC BLOOD PRESSURE: 86 MMHG | OXYGEN SATURATION: 99 % | HEIGHT: 63 IN | BODY MASS INDEX: 24.63 KG/M2 | HEART RATE: 86 BPM

## 2023-04-14 DIAGNOSIS — R09.81 NASAL CONGESTION: Primary | ICD-10-CM

## 2023-04-14 DIAGNOSIS — Z20.822 CONTACT WITH AND (SUSPECTED) EXPOSURE TO COVID-19: ICD-10-CM

## 2023-04-14 LAB
CTP QC/QA: YES
FLUAV AG NPH QL: NEGATIVE
FLUBV AG NPH QL: NEGATIVE
SARS-COV-2 AG RESP QL IA.RAPID: NEGATIVE

## 2023-04-14 PROCEDURE — 3008F PR BODY MASS INDEX (BMI) DOCUMENTED: ICD-10-PCS | Mod: ,,, | Performed by: NURSE PRACTITIONER

## 2023-04-14 PROCEDURE — 1160F RVW MEDS BY RX/DR IN RCRD: CPT | Mod: ,,, | Performed by: NURSE PRACTITIONER

## 2023-04-14 PROCEDURE — 87428 SARSCOV & INF VIR A&B AG IA: CPT | Mod: QW,,, | Performed by: NURSE PRACTITIONER

## 2023-04-14 PROCEDURE — 1160F PR REVIEW ALL MEDS BY PRESCRIBER/CLIN PHARMACIST DOCUMENTED: ICD-10-PCS | Mod: ,,, | Performed by: NURSE PRACTITIONER

## 2023-04-14 PROCEDURE — 87428 POCT SARS-COV2 (COVID) WITH FLU ANTIGEN: ICD-10-PCS | Mod: QW,,, | Performed by: NURSE PRACTITIONER

## 2023-04-14 PROCEDURE — 3074F SYST BP LT 130 MM HG: CPT | Mod: ,,, | Performed by: NURSE PRACTITIONER

## 2023-04-14 PROCEDURE — 99213 PR OFFICE/OUTPT VISIT, EST, LEVL III, 20-29 MIN: ICD-10-PCS | Mod: ,,, | Performed by: NURSE PRACTITIONER

## 2023-04-14 PROCEDURE — 1159F PR MEDICATION LIST DOCUMENTED IN MEDICAL RECORD: ICD-10-PCS | Mod: ,,, | Performed by: NURSE PRACTITIONER

## 2023-04-14 PROCEDURE — 3074F PR MOST RECENT SYSTOLIC BLOOD PRESSURE < 130 MM HG: ICD-10-PCS | Mod: ,,, | Performed by: NURSE PRACTITIONER

## 2023-04-14 PROCEDURE — 3079F DIAST BP 80-89 MM HG: CPT | Mod: ,,, | Performed by: NURSE PRACTITIONER

## 2023-04-14 PROCEDURE — 1159F MED LIST DOCD IN RCRD: CPT | Mod: ,,, | Performed by: NURSE PRACTITIONER

## 2023-04-14 PROCEDURE — 3008F BODY MASS INDEX DOCD: CPT | Mod: ,,, | Performed by: NURSE PRACTITIONER

## 2023-04-14 PROCEDURE — 99213 OFFICE O/P EST LOW 20 MIN: CPT | Mod: ,,, | Performed by: NURSE PRACTITIONER

## 2023-04-14 PROCEDURE — 3079F PR MOST RECENT DIASTOLIC BLOOD PRESSURE 80-89 MM HG: ICD-10-PCS | Mod: ,,, | Performed by: NURSE PRACTITIONER

## 2023-04-14 RX ORDER — CETIRIZINE HYDROCHLORIDE, PSEUDOEPHEDRINE HYDROCHLORIDE 5; 120 MG/1; MG/1
1 TABLET, FILM COATED, EXTENDED RELEASE ORAL 2 TIMES DAILY
Qty: 20 TABLET | Refills: 0 | Status: SHIPPED | OUTPATIENT
Start: 2023-04-14 | End: 2023-04-24

## 2023-04-14 NOTE — PROGRESS NOTES
[facility]    Chief Complaint      Chief Complaint   Patient presents with    Nasal Congestion    Headache    Chest Congestion    Fatigue    Documentation Only     X 3 days        History of Present Illness      Brandee Contreras is a 72 y.o. female. She  has a past medical history of Hypertension., who presents today for URI type symptoms x3 days.    Past Medical History:  Past Medical History:   Diagnosis Date    Hypertension        Past Surgical History:   has no past surgical history on file.    Social History:  Social History     Tobacco Use    Smoking status: Every Day     Packs/day: 0.25     Years: 2.00     Pack years: 0.50     Types: Cigarettes     Start date: 02/2021     Passive exposure: Current    Smokeless tobacco: Never   Substance Use Topics    Alcohol use: Never    Drug use: Never       I personally reviewed all past medical, surgical, and social.     Review of Systems   Constitutional:  Positive for fatigue. Negative for chills and fever.   HENT:  Positive for congestion. Negative for rhinorrhea, sneezing and sore throat.    Respiratory:  Negative for cough and shortness of breath.    Gastrointestinal:  Negative for diarrhea, nausea and vomiting.   Musculoskeletal:  Negative for myalgias.   Skin:  Negative for rash.   Neurological:  Positive for headaches.      Medications:  Outpatient Encounter Medications as of 4/14/2023   Medication Sig Dispense Refill    amLODIPine (NORVASC) 5 MG tablet Take 1 tablet (5 mg total) by mouth once daily. 90 tablet 1    chlorthalidone (HYGROTEN) 25 MG Tab Take 1 tablet (25 mg total) by mouth once daily. 90 tablet 1    ibandronate (BONIVA) 150 mg tablet Take 1 tablet (150 mg total) by mouth every 30 days. 1 tablet 11    celecoxib (CELEBREX) 200 MG capsule Take 200 mg by mouth.      cetirizine-pseudoephedrine 5-120 mg Tb12 Take 1 tablet by mouth 2 (two) times a day. for 10 days 20 tablet 0     No facility-administered encounter medications on file as of  "4/14/2023.       Allergies:  Review of patient's allergies indicates:  No Known Allergies    Health Maintenance:    There is no immunization history on file for this patient.   Health Maintenance   Topic Date Due    TETANUS VACCINE  Never done    Lipid Panel  08/02/2020    Mammogram  10/11/2023    DEXA Scan  10/26/2025    Hepatitis C Screening  Completed        Physical Exam      Vital Signs  Temp: 98.3 °F (36.8 °C)  Temp Source: Temporal  Pulse: 86  SpO2: 99 %  BP: 122/86  Height and Weight  Height: 5' 3" (160 cm)  Weight: 63 kg (139 lb)  BSA (Calculated - sq m): 1.67 sq meters  BMI (Calculated): 24.6  Weight in (lb) to have BMI = 25: 140.8]    Physical Exam  Vitals and nursing note reviewed.   Constitutional:       Appearance: Normal appearance. She is well-developed.   HENT:      Head: Normocephalic.      Right Ear: Hearing, tympanic membrane, ear canal and external ear normal.      Left Ear: Hearing, tympanic membrane, ear canal and external ear normal.      Nose: Congestion present.      Right Turbinates: Swollen.      Left Turbinates: Swollen.      Mouth/Throat:      Lips: Pink.      Pharynx: Oropharynx is clear.   Eyes:      General: Lids are normal.      Extraocular Movements: Extraocular movements intact.      Conjunctiva/sclera: Conjunctivae normal.      Pupils: Pupils are equal, round, and reactive to light.   Cardiovascular:      Rate and Rhythm: Normal rate and regular rhythm.      Heart sounds: Normal heart sounds.   Pulmonary:      Effort: Pulmonary effort is normal.      Breath sounds: Normal breath sounds.   Musculoskeletal:         General: Normal range of motion.      Cervical back: Normal range of motion and neck supple.      Right lower leg: No edema.      Left lower leg: No edema.   Skin:     General: Skin is warm and dry.   Neurological:      Mental Status: She is alert and oriented to person, place, and time.      Gait: Gait is intact.   Psychiatric:         Behavior: Behavior is cooperative. "        Laboratory:  CBC:      CMP:        Invalid input(s): CREATININ  LIPIDS:      TSH:      A1C:        Assessment/Plan     Brandee Contreras is a 72 y.o.female with:     1. Contact with and (suspected) exposure to covid-19  - POCT SARS-COV2 (COVID) with Flu Antigen    2. Nasal congestion  - cetirizine-pseudoephedrine 5-120 mg Tb12; Take 1 tablet by mouth 2 (two) times a day. for 10 days  Dispense: 20 tablet; Refill: 0       Total time spent face-to-face and non-face-to-face coordinating care for this encounter was: 20 minutes     Chronic conditions status updated as per HPI.  Other than changes above, cont current medications and maintain follow up with specialists.  Return to clinic prn if symptoms worsen or fail to improve.    Yesenia Crocker, FNP  [facility]

## 2023-05-09 ENCOUNTER — OFFICE VISIT (OUTPATIENT)
Dept: FAMILY MEDICINE | Facility: CLINIC | Age: 73
End: 2023-05-09
Payer: COMMERCIAL

## 2023-05-09 VITALS
DIASTOLIC BLOOD PRESSURE: 80 MMHG | WEIGHT: 140 LBS | BODY MASS INDEX: 24.8 KG/M2 | OXYGEN SATURATION: 100 % | HEIGHT: 63 IN | SYSTOLIC BLOOD PRESSURE: 150 MMHG | HEART RATE: 84 BPM

## 2023-05-09 DIAGNOSIS — I10 HYPERTENSION, UNSPECIFIED TYPE: Primary | ICD-10-CM

## 2023-05-09 DIAGNOSIS — Z13.220 SCREENING FOR LIPID DISORDERS: ICD-10-CM

## 2023-05-09 LAB
ALBUMIN SERPL BCP-MCNC: 3.6 G/DL (ref 3.5–5)
ALBUMIN/GLOB SERPL: 1.1 {RATIO}
ALP SERPL-CCNC: 60 U/L (ref 55–142)
ALT SERPL W P-5'-P-CCNC: 17 U/L (ref 13–56)
ANION GAP SERPL CALCULATED.3IONS-SCNC: 5 MMOL/L (ref 7–16)
AST SERPL W P-5'-P-CCNC: 16 U/L (ref 15–37)
BASOPHILS # BLD AUTO: 0.04 K/UL (ref 0–0.2)
BASOPHILS NFR BLD AUTO: 1 % (ref 0–1)
BILIRUB SERPL-MCNC: 0.3 MG/DL (ref ?–1.2)
BUN SERPL-MCNC: 16 MG/DL (ref 7–18)
BUN/CREAT SERPL: 25 (ref 6–20)
CALCIUM SERPL-MCNC: 8.8 MG/DL (ref 8.5–10.1)
CHLORIDE SERPL-SCNC: 105 MMOL/L (ref 98–107)
CHOLEST SERPL-MCNC: 200 MG/DL (ref 0–200)
CHOLEST/HDLC SERPL: 2.6 {RATIO}
CO2 SERPL-SCNC: 30 MMOL/L (ref 21–32)
CREAT SERPL-MCNC: 0.65 MG/DL (ref 0.55–1.02)
DIFFERENTIAL METHOD BLD: ABNORMAL
EGFR (NO RACE VARIABLE) (RUSH/TITUS): 93 ML/MIN/1.73M2
EOSINOPHIL # BLD AUTO: 0.13 K/UL (ref 0–0.5)
EOSINOPHIL NFR BLD AUTO: 3.1 % (ref 1–4)
ERYTHROCYTE [DISTWIDTH] IN BLOOD BY AUTOMATED COUNT: 12.6 % (ref 11.5–14.5)
GLOBULIN SER-MCNC: 3.4 G/DL (ref 2–4)
GLUCOSE SERPL-MCNC: 87 MG/DL (ref 74–106)
HCT VFR BLD AUTO: 38.9 % (ref 38–47)
HDLC SERPL-MCNC: 78 MG/DL (ref 40–60)
HGB BLD-MCNC: 12.2 G/DL (ref 12–16)
IMM GRANULOCYTES # BLD AUTO: 0.01 K/UL (ref 0–0.04)
IMM GRANULOCYTES NFR BLD: 0.2 % (ref 0–0.4)
LDLC SERPL CALC-MCNC: 105 MG/DL
LDLC/HDLC SERPL: 1.3 {RATIO}
LYMPHOCYTES # BLD AUTO: 1.54 K/UL (ref 1–4.8)
LYMPHOCYTES NFR BLD AUTO: 37.2 % (ref 27–41)
MCH RBC QN AUTO: 29.6 PG (ref 27–31)
MCHC RBC AUTO-ENTMCNC: 31.4 G/DL (ref 32–36)
MCV RBC AUTO: 94.4 FL (ref 80–96)
MONOCYTES # BLD AUTO: 0.38 K/UL (ref 0–0.8)
MONOCYTES NFR BLD AUTO: 9.2 % (ref 2–6)
MPC BLD CALC-MCNC: 10.9 FL (ref 9.4–12.4)
NEUTROPHILS # BLD AUTO: 2.04 K/UL (ref 1.8–7.7)
NEUTROPHILS NFR BLD AUTO: 49.3 % (ref 53–65)
NONHDLC SERPL-MCNC: 122 MG/DL
NRBC # BLD AUTO: 0 X10E3/UL
NRBC, AUTO (.00): 0 %
PLATELET # BLD AUTO: 259 K/UL (ref 150–400)
POTASSIUM SERPL-SCNC: 4.2 MMOL/L (ref 3.5–5.1)
PROT SERPL-MCNC: 7 G/DL (ref 6.4–8.2)
RBC # BLD AUTO: 4.12 M/UL (ref 4.2–5.4)
SODIUM SERPL-SCNC: 136 MMOL/L (ref 136–145)
TRIGL SERPL-MCNC: 87 MG/DL (ref 35–150)
VLDLC SERPL-MCNC: 17 MG/DL
WBC # BLD AUTO: 4.14 K/UL (ref 4.5–11)

## 2023-05-09 PROCEDURE — 1159F MED LIST DOCD IN RCRD: CPT | Mod: ,,, | Performed by: NURSE PRACTITIONER

## 2023-05-09 PROCEDURE — 3008F BODY MASS INDEX DOCD: CPT | Mod: ,,, | Performed by: NURSE PRACTITIONER

## 2023-05-09 PROCEDURE — 99213 PR OFFICE/OUTPT VISIT, EST, LEVL III, 20-29 MIN: ICD-10-PCS | Mod: ,,, | Performed by: NURSE PRACTITIONER

## 2023-05-09 PROCEDURE — 3077F SYST BP >= 140 MM HG: CPT | Mod: ,,, | Performed by: NURSE PRACTITIONER

## 2023-05-09 PROCEDURE — 3079F DIAST BP 80-89 MM HG: CPT | Mod: ,,, | Performed by: NURSE PRACTITIONER

## 2023-05-09 PROCEDURE — 1159F PR MEDICATION LIST DOCUMENTED IN MEDICAL RECORD: ICD-10-PCS | Mod: ,,, | Performed by: NURSE PRACTITIONER

## 2023-05-09 PROCEDURE — 1160F RVW MEDS BY RX/DR IN RCRD: CPT | Mod: ,,, | Performed by: NURSE PRACTITIONER

## 2023-05-09 PROCEDURE — 80061 LIPID PANEL: ICD-10-PCS | Mod: ,,, | Performed by: CLINICAL MEDICAL LABORATORY

## 2023-05-09 PROCEDURE — 99213 OFFICE O/P EST LOW 20 MIN: CPT | Mod: ,,, | Performed by: NURSE PRACTITIONER

## 2023-05-09 PROCEDURE — 3077F PR MOST RECENT SYSTOLIC BLOOD PRESSURE >= 140 MM HG: ICD-10-PCS | Mod: ,,, | Performed by: NURSE PRACTITIONER

## 2023-05-09 PROCEDURE — 80053 COMPREHEN METABOLIC PANEL: CPT | Mod: ,,, | Performed by: CLINICAL MEDICAL LABORATORY

## 2023-05-09 PROCEDURE — 80053 COMPREHENSIVE METABOLIC PANEL: ICD-10-PCS | Mod: ,,, | Performed by: CLINICAL MEDICAL LABORATORY

## 2023-05-09 PROCEDURE — 80061 LIPID PANEL: CPT | Mod: ,,, | Performed by: CLINICAL MEDICAL LABORATORY

## 2023-05-09 PROCEDURE — 85025 CBC WITH DIFFERENTIAL: ICD-10-PCS | Mod: ,,, | Performed by: CLINICAL MEDICAL LABORATORY

## 2023-05-09 PROCEDURE — 3008F PR BODY MASS INDEX (BMI) DOCUMENTED: ICD-10-PCS | Mod: ,,, | Performed by: NURSE PRACTITIONER

## 2023-05-09 PROCEDURE — 85025 COMPLETE CBC W/AUTO DIFF WBC: CPT | Mod: ,,, | Performed by: CLINICAL MEDICAL LABORATORY

## 2023-05-09 PROCEDURE — 3079F PR MOST RECENT DIASTOLIC BLOOD PRESSURE 80-89 MM HG: ICD-10-PCS | Mod: ,,, | Performed by: NURSE PRACTITIONER

## 2023-05-09 PROCEDURE — 1160F PR REVIEW ALL MEDS BY PRESCRIBER/CLIN PHARMACIST DOCUMENTED: ICD-10-PCS | Mod: ,,, | Performed by: NURSE PRACTITIONER

## 2023-05-09 RX ORDER — AMLODIPINE BESYLATE 5 MG/1
5 TABLET ORAL DAILY
Qty: 90 TABLET | Refills: 1 | Status: SHIPPED | OUTPATIENT
Start: 2023-05-09 | End: 2023-11-01 | Stop reason: SDUPTHER

## 2023-05-09 RX ORDER — CHLORTHALIDONE 25 MG/1
25 TABLET ORAL DAILY
Qty: 90 TABLET | Refills: 1 | Status: SHIPPED | OUTPATIENT
Start: 2023-05-09 | End: 2023-11-01 | Stop reason: SDUPTHER

## 2023-05-09 NOTE — PROGRESS NOTES
[facility]    Chief Complaint      Chief Complaint   Patient presents with    Medication Refill     Needs med refills today- out of meds- BP elevated today- pt is ok with doing labs today       History of Present Illness      Brandee Contreras is a 72 y.o. female. She  has a past medical history of Hypertension., who presents today for routine f/u and medication refills.  Has been out of her medications.    Past Medical History:  Past Medical History:   Diagnosis Date    Hypertension        Past Surgical History:   has no past surgical history on file.    Social History:  Social History     Tobacco Use    Smoking status: Every Day     Packs/day: 0.25     Years: 2.00     Pack years: 0.50     Types: Cigarettes     Start date: 02/2021     Passive exposure: Current    Smokeless tobacco: Never   Substance Use Topics    Alcohol use: Never    Drug use: Never       I personally reviewed all past medical, surgical, and social.     Review of Systems   Constitutional:  Negative for fatigue.   HENT:  Negative for ear pain and sore throat.    Eyes:  Negative for pain, discharge and visual disturbance.   Respiratory:  Negative for cough and shortness of breath.    Cardiovascular: Negative.    Gastrointestinal:  Negative for abdominal pain, constipation and diarrhea.   Genitourinary:  Negative for dysuria, menstrual problem and vaginal discharge.   Musculoskeletal:  Negative for gait problem.   Skin: Negative.    Neurological:  Negative for dizziness and light-headedness.      Medications:  Outpatient Encounter Medications as of 5/9/2023   Medication Sig Dispense Refill    ibandronate (BONIVA) 150 mg tablet Take 1 tablet (150 mg total) by mouth every 30 days. 1 tablet 11    [DISCONTINUED] amLODIPine (NORVASC) 5 MG tablet Take 1 tablet (5 mg total) by mouth once daily. 90 tablet 1    [DISCONTINUED] chlorthalidone (HYGROTEN) 25 MG Tab Take 1 tablet (25 mg total) by mouth once daily. 90 tablet 1    amLODIPine (NORVASC) 5  "MG tablet Take 1 tablet (5 mg total) by mouth once daily. 90 tablet 1    chlorthalidone (HYGROTEN) 25 MG Tab Take 1 tablet (25 mg total) by mouth once daily. 90 tablet 1    [DISCONTINUED] celecoxib (CELEBREX) 200 MG capsule Take 200 mg by mouth.       No facility-administered encounter medications on file as of 5/9/2023.       Allergies:  Review of patient's allergies indicates:  No Known Allergies    Health Maintenance:    There is no immunization history on file for this patient.   Health Maintenance   Topic Date Due    TETANUS VACCINE  Never done    Mammogram  10/11/2023    Lipid Panel  05/09/2024    DEXA Scan  10/26/2025    Hepatitis C Screening  Completed        Physical Exam      Vital Signs  Pulse: 84  SpO2: 100 %  BP: (!) 150/80  Height and Weight  Height: 5' 3" (160 cm)  Weight: 63.5 kg (140 lb)  BSA (Calculated - sq m): 1.68 sq meters  BMI (Calculated): 24.8  Weight in (lb) to have BMI = 25: 140.8]    Physical Exam  Vitals and nursing note reviewed.   Constitutional:       Appearance: Normal appearance. She is well-developed.   HENT:      Head: Normocephalic.      Right Ear: Hearing normal.      Left Ear: Hearing normal.      Nose: Nose normal.   Eyes:      General: Lids are normal.      Extraocular Movements: Extraocular movements intact.      Conjunctiva/sclera: Conjunctivae normal.      Pupils: Pupils are equal, round, and reactive to light.   Cardiovascular:      Rate and Rhythm: Normal rate and regular rhythm.      Heart sounds: Normal heart sounds.   Pulmonary:      Effort: Pulmonary effort is normal.      Breath sounds: Normal breath sounds.   Musculoskeletal:         General: Normal range of motion.      Cervical back: Normal range of motion and neck supple.      Right lower leg: No edema.      Left lower leg: No edema.   Skin:     General: Skin is warm and dry.   Neurological:      Mental Status: She is alert and oriented to person, place, and time.      Gait: Gait is intact.   Psychiatric:        "  Behavior: Behavior is cooperative.        Laboratory:  CBC:  Recent Labs   Lab 05/09/23  1143   WBC 4.14 L   RBC 4.12 L   Hemoglobin 12.2   Hematocrit 38.9   Platelet Count 259   MCV 94.4   MCH 29.6   MCHC 31.4 L     CMP:  Recent Labs   Lab 05/09/23  1143   Glucose 87   Calcium 8.8   Albumin 3.6   Total Protein 7.0   Sodium 136   Potassium 4.2   CO2 30   Chloride 105   BUN 16   Alk Phos 60   ALT 17   AST 16   Bilirubin, Total 0.3     LIPIDS:  Recent Labs   Lab 05/09/23  1143   HDL Cholesterol 78 H   Cholesterol 200   Triglycerides 87   LDL Calculated 105   Cholesterol/HDL Ratio (Risk Factor) 2.6   Non-     TSH:      A1C:        Assessment/Plan     Brandee Contreras is a 72 y.o.female with:     1. Screening for lipid disorders  - Lipid Panel; Future  - Lipid Panel    2. Hypertension, unspecified type  - CBC Auto Differential; Future  - Comprehensive Metabolic Panel; Future  - amLODIPine (NORVASC) 5 MG tablet; Take 1 tablet (5 mg total) by mouth once daily.  Dispense: 90 tablet; Refill: 1  - chlorthalidone (HYGROTEN) 25 MG Tab; Take 1 tablet (25 mg total) by mouth once daily.  Dispense: 90 tablet; Refill: 1  - CBC Auto Differential  - Comprehensive Metabolic Panel       Total time spent face-to-face and non-face-to-face coordinating care for this encounter was: 30 minutes    Chronic conditions status updated as per HPI.  Other than changes above, cont current medications and maintain follow up with specialists.  Return to clinic in 1 month(s).    Yesenia Crocker, FNP  [facility]

## 2023-05-15 ENCOUNTER — TELEPHONE (OUTPATIENT)
Dept: FAMILY MEDICINE | Facility: CLINIC | Age: 73
End: 2023-05-15
Payer: COMMERCIAL

## 2023-08-23 ENCOUNTER — OFFICE VISIT (OUTPATIENT)
Dept: ORTHOPEDICS | Facility: CLINIC | Age: 73
End: 2023-08-23
Payer: COMMERCIAL

## 2023-08-23 VITALS — WEIGHT: 140 LBS | BODY MASS INDEX: 24.8 KG/M2 | HEIGHT: 63 IN

## 2023-08-23 DIAGNOSIS — M25.552 BILATERAL HIP PAIN: Primary | ICD-10-CM

## 2023-08-23 DIAGNOSIS — M25.551 BILATERAL HIP PAIN: Primary | ICD-10-CM

## 2023-08-23 DIAGNOSIS — M70.62 TROCHANTERIC BURSITIS OF BOTH HIPS: ICD-10-CM

## 2023-08-23 DIAGNOSIS — M70.61 TROCHANTERIC BURSITIS OF BOTH HIPS: ICD-10-CM

## 2023-08-23 DIAGNOSIS — Z01.812 PRE-OPERATIVE LABORATORY EXAMINATION: ICD-10-CM

## 2023-08-23 DIAGNOSIS — Z01.811 PRE-OPERATIVE RESPIRATORY EXAMINATION: ICD-10-CM

## 2023-08-23 DIAGNOSIS — Z01.810 PRE-OPERATIVE CARDIOVASCULAR EXAMINATION: ICD-10-CM

## 2023-08-23 PROCEDURE — 1159F MED LIST DOCD IN RCRD: CPT | Mod: CPTII,,, | Performed by: ORTHOPAEDIC SURGERY

## 2023-08-23 PROCEDURE — 99999PBSHW PR PBB SHADOW TECHNICAL ONLY FILED TO HB: Mod: PBBFAC,,,

## 2023-08-23 PROCEDURE — 3008F PR BODY MASS INDEX (BMI) DOCUMENTED: ICD-10-PCS | Mod: CPTII,,, | Performed by: ORTHOPAEDIC SURGERY

## 2023-08-23 PROCEDURE — 20610 LARGE JOINT ASPIRATION/INJECTION: L GREATER TROCHANTERIC BURSA: ICD-10-PCS | Mod: S$PBB,LT,, | Performed by: ORTHOPAEDIC SURGERY

## 2023-08-23 PROCEDURE — 99214 PR OFFICE/OUTPT VISIT, EST, LEVL IV, 30-39 MIN: ICD-10-PCS | Mod: S$PBB,25,, | Performed by: ORTHOPAEDIC SURGERY

## 2023-08-23 PROCEDURE — 99999PBSHW PR PBB SHADOW TECHNICAL ONLY FILED TO HB: ICD-10-PCS | Mod: PBBFAC,,,

## 2023-08-23 PROCEDURE — 99213 OFFICE O/P EST LOW 20 MIN: CPT | Mod: PBBFAC,25 | Performed by: ORTHOPAEDIC SURGERY

## 2023-08-23 PROCEDURE — 1159F PR MEDICATION LIST DOCUMENTED IN MEDICAL RECORD: ICD-10-PCS | Mod: CPTII,,, | Performed by: ORTHOPAEDIC SURGERY

## 2023-08-23 PROCEDURE — 20610 DRAIN/INJ JOINT/BURSA W/O US: CPT | Mod: PBBFAC | Performed by: ORTHOPAEDIC SURGERY

## 2023-08-23 PROCEDURE — 3008F BODY MASS INDEX DOCD: CPT | Mod: CPTII,,, | Performed by: ORTHOPAEDIC SURGERY

## 2023-08-23 PROCEDURE — 99214 OFFICE O/P EST MOD 30 MIN: CPT | Mod: S$PBB,25,, | Performed by: ORTHOPAEDIC SURGERY

## 2023-08-23 RX ORDER — CELECOXIB 200 MG/1
CAPSULE ORAL
COMMUNITY
Start: 2023-08-21

## 2023-08-23 RX ORDER — BUPIVACAINE HYDROCHLORIDE 2.5 MG/ML
1 INJECTION, SOLUTION EPIDURAL; INFILTRATION; INTRACAUDAL
Status: DISCONTINUED | OUTPATIENT
Start: 2023-08-23 | End: 2023-08-23 | Stop reason: HOSPADM

## 2023-08-23 RX ORDER — TRIAMCINOLONE ACETONIDE 40 MG/ML
40 INJECTION, SUSPENSION INTRA-ARTICULAR; INTRAMUSCULAR
Status: DISCONTINUED | OUTPATIENT
Start: 2023-08-23 | End: 2023-08-23 | Stop reason: HOSPADM

## 2023-08-23 RX ADMIN — TRIAMCINOLONE ACETONIDE 40 MG: 40 INJECTION, SUSPENSION INTRA-ARTICULAR; INTRAMUSCULAR at 09:08

## 2023-08-23 RX ADMIN — BUPIVACAINE HYDROCHLORIDE 1 ML: 2.5 INJECTION, SOLUTION EPIDURAL; INFILTRATION; INTRACAUDAL at 09:08

## 2023-08-23 NOTE — PATIENT INSTRUCTIONS
Your surgery is scheduled at [facility] in [place] for 2023    Pre-Op Testin2023 @ 7:30a.m.    __x_____ Lab (Clinic Lab 1st Floor)  __x_____ Chest X-ray ([facility] 1st Floor)  __x_____ EKG (Clinic 2nd Floor)    __x_____ Total Joint Patients Only--Cardiac/Medical Clearance appointment with Dr. Mccray on 2023 at 8:30a.m.     __x_____ (For Total Hips Only) CT Hip at [facility] on 2023 at 10:00a.m.    Our office will contact you the day before surgery to give you  the arrival time.    *Do NOT eat or drink anything after midnight the night before your surgery.    *Bring all medications in their original bottles.    *Bring anything you may need for an overnight stay.     *Bathe with Hibiclens the night or morning before surgery.    *The morning of your surgery ONLY take blood pressure medications, heart medications, medications for acid reflux, and thyroid medications (the morning dose only).  *Take these medications with a sip of water.    *Do not take Insulin or Diabetic Medications the night before or the morning of your surgery, unless directed otherwise.    *Be sure that you have stopped blood thinners at the appropriate time, as instructed.  (_____ days prior to surgery)    *Bring your C-Pap machine if you have one.    *All jewelry and piercings MUST be removed prior to surgery.    *False eye lashes must be removed prior to surgery.    *Any questions regarding co-pays or deductibles with insurance, please contact the [facility] at #[phone].    *For Financial Assistance you may call #[phone] or #[phone].    Thank you for choosing Dr. He Young for your Orthopedic needs.  We look forward to caring for you. If you have any questions, please contact our office at [phone].

## 2023-08-23 NOTE — PROGRESS NOTES
Recommendation is for a steroid in injection in the hip. Risks and benefits of the procedure were explained. The patient verbalized understanding and did wish to proceed. After verbal consent was obtained we proceeded with injection. The hip was prepped with alcohol  betadine and the skin was anesthetized with cold spray. The knee was injected with a mixture of 1cc marcaine, and 1cc kenalog. A bandage was applied. The patient tolerated the injection well Patient is here for bilateral hip pain she is 72-year-old female she is had intra-articular injections she is been walking with a cane she has an antalgic gait she has some limitation on abduction adduction left hip pain and crepitus on range of motion both hips we discussed treatment options.  She wished to proceed with a total hip arthroplasty on left she is failed non operative treatment including injections using a cane for greater than 3 months taken anti-inflammatories now risk for falls we are going to schedule her for a left total hip arthroplasty in the near future neurovascularly she is intact distally.  Risks and benefits of surgery were discussed with patient patient wishes to proceed with surgery.

## 2023-08-23 NOTE — PROCEDURES
Large Joint Aspiration/Injection: L greater trochanteric bursa    Date/Time: 8/23/2023 9:30 AM    Performed by: He Young MD  Authorized by: He Young MD    Consent Done?:  Yes (Verbal)  Indications:  Pain  Prep: patient was prepped and draped in usual sterile fashion      Details:  Needle Size:  22 G  Ultrasonic Guidance for needle placement?: No    Approach:  Lateral  Location:  Hip  Site:  L greater trochanteric bursa  Medications:  1 mL BUPivacaine (PF) 0.25% (2.5 mg/ml) 0.25 % (2.5 mg/mL); 40 mg triamcinolone acetonide 40 mg/mL  Patient tolerance:  Patient tolerated the procedure well with no immediate complications

## 2023-10-10 ENCOUNTER — TELEPHONE (OUTPATIENT)
Dept: ORTHOPEDICS | Facility: CLINIC | Age: 73
End: 2023-10-10
Payer: COMMERCIAL

## 2023-10-10 DIAGNOSIS — M25.552 BILATERAL HIP PAIN: Primary | ICD-10-CM

## 2023-10-10 DIAGNOSIS — M25.552 LEFT HIP PAIN: ICD-10-CM

## 2023-10-10 DIAGNOSIS — M25.551 BILATERAL HIP PAIN: Primary | ICD-10-CM

## 2023-10-10 NOTE — TELEPHONE ENCOUNTER
----- Message from Alicia Rodriguez sent at 10/10/2023  9:33 AM CDT -----  Regarding: SURGERY  Contact: ROSEMARIE POSEY  Patient would like for you to call her it's about reschedule her Hip Surgery [phone]

## 2023-10-30 ENCOUNTER — TELEPHONE (OUTPATIENT)
Dept: INTERNAL MEDICINE | Facility: CLINIC | Age: 73
End: 2023-10-30
Payer: COMMERCIAL

## 2023-10-30 NOTE — TELEPHONE ENCOUNTER
----- Message from Cony Woods sent at 10/30/2023  1:25 PM CDT -----  Patient would like a nurse to give her a call about her surgery instruction her number is [phone].

## 2023-11-01 ENCOUNTER — HOSPITAL ENCOUNTER (OUTPATIENT)
Dept: RADIOLOGY | Facility: HOSPITAL | Age: 73
Discharge: HOME | End: 2023-11-01
Attending: ORTHOPAEDIC SURGERY
Payer: COMMERCIAL

## 2023-11-01 ENCOUNTER — OFFICE VISIT (OUTPATIENT)
Dept: INTERNAL MEDICINE | Facility: CLINIC | Age: 73
End: 2023-11-01
Payer: COMMERCIAL

## 2023-11-01 VITALS
SYSTOLIC BLOOD PRESSURE: 144 MMHG | TEMPERATURE: 97 F | HEART RATE: 77 BPM | DIASTOLIC BLOOD PRESSURE: 76 MMHG | RESPIRATION RATE: 18 BRPM | OXYGEN SATURATION: 99 % | WEIGHT: 137 LBS | BODY MASS INDEX: 24.27 KG/M2 | HEIGHT: 63 IN

## 2023-11-01 DIAGNOSIS — Z01.811 PRE-OPERATIVE RESPIRATORY EXAMINATION: ICD-10-CM

## 2023-11-01 DIAGNOSIS — Z01.818 PREOP EXAMINATION: Primary | ICD-10-CM

## 2023-11-01 DIAGNOSIS — M25.552 LEFT HIP PAIN: ICD-10-CM

## 2023-11-01 DIAGNOSIS — F17.200 TOBACCO DEPENDENCE: ICD-10-CM

## 2023-11-01 DIAGNOSIS — M16.0 ARTHRITIS OF BOTH HIPS: ICD-10-CM

## 2023-11-01 DIAGNOSIS — M81.0 OSTEOPOROSIS, UNSPECIFIED OSTEOPOROSIS TYPE, UNSPECIFIED PATHOLOGICAL FRACTURE PRESENCE: ICD-10-CM

## 2023-11-01 DIAGNOSIS — I10 HYPERTENSION, UNSPECIFIED TYPE: ICD-10-CM

## 2023-11-01 PROCEDURE — 3078F PR MOST RECENT DIASTOLIC BLOOD PRESSURE < 80 MM HG: ICD-10-PCS | Mod: CPTII,,, | Performed by: INTERNAL MEDICINE

## 2023-11-01 PROCEDURE — 1101F PT FALLS ASSESS-DOCD LE1/YR: CPT | Mod: CPTII,,, | Performed by: INTERNAL MEDICINE

## 2023-11-01 PROCEDURE — 73700 CT HIP WITHOUT CONTRAST LEFT W/MAKO PROTOCOL: ICD-10-PCS | Mod: 26,LT,, | Performed by: RADIOLOGY

## 2023-11-01 PROCEDURE — 99214 OFFICE O/P EST MOD 30 MIN: CPT | Mod: PBBFAC,25 | Performed by: INTERNAL MEDICINE

## 2023-11-01 PROCEDURE — 3077F PR MOST RECENT SYSTOLIC BLOOD PRESSURE >= 140 MM HG: ICD-10-PCS | Mod: CPTII,,, | Performed by: INTERNAL MEDICINE

## 2023-11-01 PROCEDURE — 1101F PR PT FALLS ASSESS DOC 0-1 FALLS W/OUT INJ PAST YR: ICD-10-PCS | Mod: CPTII,,, | Performed by: INTERNAL MEDICINE

## 2023-11-01 PROCEDURE — 3288F FALL RISK ASSESSMENT DOCD: CPT | Mod: CPTII,,, | Performed by: INTERNAL MEDICINE

## 2023-11-01 PROCEDURE — 99204 OFFICE O/P NEW MOD 45 MIN: CPT | Mod: S$PBB,,, | Performed by: INTERNAL MEDICINE

## 2023-11-01 PROCEDURE — 1159F PR MEDICATION LIST DOCUMENTED IN MEDICAL RECORD: ICD-10-PCS | Mod: CPTII,,, | Performed by: INTERNAL MEDICINE

## 2023-11-01 PROCEDURE — 3077F SYST BP >= 140 MM HG: CPT | Mod: CPTII,,, | Performed by: INTERNAL MEDICINE

## 2023-11-01 PROCEDURE — 71046 X-RAY EXAM CHEST 2 VIEWS: CPT | Mod: 26,,, | Performed by: RADIOLOGY

## 2023-11-01 PROCEDURE — 1125F AMNT PAIN NOTED PAIN PRSNT: CPT | Mod: CPTII,,, | Performed by: INTERNAL MEDICINE

## 2023-11-01 PROCEDURE — 3288F PR FALLS RISK ASSESSMENT DOCUMENTED: ICD-10-PCS | Mod: CPTII,,, | Performed by: INTERNAL MEDICINE

## 2023-11-01 PROCEDURE — 73700 CT LOWER EXTREMITY W/O DYE: CPT | Mod: 26,LT,, | Performed by: RADIOLOGY

## 2023-11-01 PROCEDURE — 1159F MED LIST DOCD IN RCRD: CPT | Mod: CPTII,,, | Performed by: INTERNAL MEDICINE

## 2023-11-01 PROCEDURE — 3008F PR BODY MASS INDEX (BMI) DOCUMENTED: ICD-10-PCS | Mod: CPTII,,, | Performed by: INTERNAL MEDICINE

## 2023-11-01 PROCEDURE — 99204 PR OFFICE/OUTPT VISIT, NEW, LEVL IV, 45-59 MIN: ICD-10-PCS | Mod: S$PBB,,, | Performed by: INTERNAL MEDICINE

## 2023-11-01 PROCEDURE — 71046 XR CHEST PA AND LATERAL: ICD-10-PCS | Mod: 26,,, | Performed by: RADIOLOGY

## 2023-11-01 PROCEDURE — 1125F PR PAIN SEVERITY QUANTIFIED, PAIN PRESENT: ICD-10-PCS | Mod: CPTII,,, | Performed by: INTERNAL MEDICINE

## 2023-11-01 PROCEDURE — 3078F DIAST BP <80 MM HG: CPT | Mod: CPTII,,, | Performed by: INTERNAL MEDICINE

## 2023-11-01 PROCEDURE — 3008F BODY MASS INDEX DOCD: CPT | Mod: CPTII,,, | Performed by: INTERNAL MEDICINE

## 2023-11-01 PROCEDURE — 71046 X-RAY EXAM CHEST 2 VIEWS: CPT | Mod: TC

## 2023-11-01 PROCEDURE — 73700 CT LOWER EXTREMITY W/O DYE: CPT | Mod: TC,LT

## 2023-11-01 RX ORDER — AMLODIPINE BESYLATE 5 MG/1
5 TABLET ORAL DAILY
Qty: 90 TABLET | Refills: 1 | Status: SHIPPED | OUTPATIENT
Start: 2023-11-01

## 2023-11-01 RX ORDER — CHLORTHALIDONE 25 MG/1
25 TABLET ORAL DAILY
Qty: 90 TABLET | Refills: 1 | Status: SHIPPED | OUTPATIENT
Start: 2023-11-01

## 2023-11-01 NOTE — PROGRESS NOTES
Subjective     Patient ID: Brandee Contreras is a 72 y.o. female.    Chief Complaint: Pre-op Exam (Left BRYAN with  on 11.14.23 /Would like refill on Amlodipine and hygroten. )    The patient is a 72-year-old female the presents today for surgical preop risk stratification.  She is scheduled to have a left total hip replacement on November 14th with Dr. Young.  No prior orthopedic procedures.  She denies any issues with anesthesia in the past.  She has a past medical history of hypertension, osteoporosis, and arthritis.  She also occasionally smokes cigarettes.  She states that a pack will last her a week to 2 weeks.  No known history of coronary artery disease, cerebrovascular disease, CHF, or valvular heart disease.  She denies any chest pain at rest or with moderate exertion.  No family history of premature coronary artery disease.  She is resting comfortably today in no distress.  She is afebrile and vital signs are stable.      Review of Systems   Constitutional:  Negative for appetite change, chills, fatigue and fever.   HENT:  Negative for nasal congestion, ear pain, hearing loss, sinus pressure/congestion and sore throat.    Eyes:  Negative for pain, redness and visual disturbance.   Respiratory:  Negative for apnea, cough, shortness of breath and wheezing.    Cardiovascular:  Negative for chest pain and palpitations.   Gastrointestinal:  Negative for abdominal pain, constipation, diarrhea and nausea.   Endocrine: Negative for cold intolerance, heat intolerance and polyuria.   Genitourinary:  Negative for dysuria and hematuria.   Musculoskeletal:  Positive for arthralgias. Negative for back pain, joint swelling, myalgias and neck pain.   Integumentary:  Negative for pallor, rash and wound.   Allergic/Immunologic: Negative for immunocompromised state.   Neurological:  Negative for tremors, seizures, weakness, headaches and memory loss.   Hematological:  Negative for adenopathy.    Psychiatric/Behavioral:  Negative for confusion, dysphoric mood and sleep disturbance. The patient is not nervous/anxious.           Objective     Physical Exam  Vitals and nursing note reviewed.   Constitutional:       General: She is not in acute distress.     Appearance: Normal appearance. She is not ill-appearing.   HENT:      Head: Normocephalic and atraumatic.      Right Ear: External ear normal.      Left Ear: External ear normal.      Nose: Nose normal.      Mouth/Throat:      Pharynx: Oropharynx is clear.   Eyes:      Extraocular Movements: Extraocular movements intact.      Conjunctiva/sclera: Conjunctivae normal.      Pupils: Pupils are equal, round, and reactive to light.   Neck:      Vascular: No carotid bruit.   Cardiovascular:      Rate and Rhythm: Normal rate and regular rhythm.      Pulses: Normal pulses.      Heart sounds: Normal heart sounds. No murmur heard.  Pulmonary:      Effort: No respiratory distress.      Breath sounds: Normal breath sounds. No wheezing or rales.   Abdominal:      General: Bowel sounds are normal.      Palpations: Abdomen is soft.   Musculoskeletal:         General: Normal range of motion.      Cervical back: Normal range of motion and neck supple.      Right lower leg: No edema.      Left lower leg: No edema.   Skin:     General: Skin is warm and dry.      Capillary Refill: Capillary refill takes less than 2 seconds.      Coloration: Skin is not pale.   Neurological:      General: No focal deficit present.      Mental Status: She is alert and oriented to person, place, and time.      Cranial Nerves: No cranial nerve deficit.      Sensory: No sensory deficit.      Motor: No weakness.   Psychiatric:         Mood and Affect: Mood normal.         Judgment: Judgment normal.            Assessment and Plan     1. Preop examination    2. Hypertension, unspecified type  -     amLODIPine (NORVASC) 5 MG tablet; Take 1 tablet (5 mg total) by mouth once daily.  Dispense: 90 tablet;  Refill: 1  -     chlorthalidone (HYGROTEN) 25 MG Tab; Take 1 tablet (25 mg total) by mouth once daily.  Dispense: 90 tablet; Refill: 1    3. Arthritis of both hips    4. Tobacco dependence        1. Left hip osteoarthritis- Patient presents today for surgical preop risk stratification.  No known history of coronary artery disease, cerebrovascular disease, CHF, or valvular heart disease.  She is able to perform greater than 4 metabolic equivalents without any chest pain or shortness of breath.  We have reviewed available lab work and imaging.  Her revised cardiac risk index is class 1.  ACS-SRC risk for series complication is 6%.  This is lower than the average risk for someone her age.  Overall she is considered low risk for this procedure.  Okay to proceed to surgery without any further testing    2. Essential hypertension-continue home medications perioperatively    3. Asymptomatic bacteriuria-findings consistent with contaminant.  This does not represent urinary tract infection.  No signs or symptoms and no need for antibiotics at this time    4. Osteoporosis-she takes Boniva    5. Tobacco dependence-counseled on the importance of smoking cessation.  She voices understanding and is in planning stage.  O2 sat 99% on room air.           No follow-ups on file.

## 2023-11-02 ENCOUNTER — TELEPHONE (OUTPATIENT)
Dept: ORTHOPEDICS | Facility: CLINIC | Age: 73
End: 2023-11-02
Payer: COMMERCIAL

## 2023-11-02 DIAGNOSIS — N39.0 URINARY TRACT INFECTION WITHOUT HEMATURIA, SITE UNSPECIFIED: Primary | ICD-10-CM

## 2023-11-02 RX ORDER — SULFAMETHOXAZOLE AND TRIMETHOPRIM 800; 160 MG/1; MG/1
1 TABLET ORAL 2 TIMES DAILY
Qty: 10 TABLET | Refills: 0 | Status: SHIPPED | OUTPATIENT
Start: 2023-11-02

## 2023-11-02 NOTE — TELEPHONE ENCOUNTER
----- Message from He Young MD sent at 11/1/2023 10:05 AM CDT -----  Patient has UTI with positive leukocyte esterase.  Treat with Bactrim DS.  10. One p.o. b.i.d.

## 2023-11-02 NOTE — TELEPHONE ENCOUNTER
Spoke to patient and notified her that her U/A shows that she has an UTI and Dr. Young wants to treat her with Bactrim for 5 days.  Patient agrees and wants medicine sent in to [facility].  Explained to patient to come and repeat her U/A after she completes her antibiotics. Patient agrees.

## 2023-11-10 NOTE — PLAN OF CARE
SW attempted x3 to speak with pt on this day to complete dcp for LTHR scheduled on Tuesday 11/14/23. No answer. Left voice message. Ss following.

## 2023-11-13 ENCOUNTER — CLINICAL SUPPORT (OUTPATIENT)
Dept: CARDIOLOGY | Facility: CLINIC | Age: 73
End: 2023-11-13
Payer: COMMERCIAL

## 2023-11-13 ENCOUNTER — OFFICE VISIT (OUTPATIENT)
Dept: OBSTETRICS AND GYNECOLOGY | Facility: CLINIC | Age: 73
End: 2023-11-13
Payer: COMMERCIAL

## 2023-11-13 ENCOUNTER — ANESTHESIA EVENT (OUTPATIENT)
Dept: SURGERY | Facility: HOSPITAL | Age: 73
End: 2023-11-13
Payer: COMMERCIAL

## 2023-11-13 VITALS
SYSTOLIC BLOOD PRESSURE: 156 MMHG | WEIGHT: 134.81 LBS | DIASTOLIC BLOOD PRESSURE: 86 MMHG | HEART RATE: 88 BPM | BODY MASS INDEX: 23.88 KG/M2

## 2023-11-13 DIAGNOSIS — Z12.31 ENCOUNTER FOR SCREENING MAMMOGRAM FOR HIGH-RISK PATIENT: ICD-10-CM

## 2023-11-13 DIAGNOSIS — Z01.810 PRE-OPERATIVE CARDIOVASCULAR EXAMINATION: ICD-10-CM

## 2023-11-13 DIAGNOSIS — Z01.419 ROUTINE GYNECOLOGICAL EXAMINATION: Primary | ICD-10-CM

## 2023-11-13 DIAGNOSIS — M81.0 OSTEOPOROSIS, UNSPECIFIED OSTEOPOROSIS TYPE, UNSPECIFIED PATHOLOGICAL FRACTURE PRESENCE: ICD-10-CM

## 2023-11-13 DIAGNOSIS — Z01.810 PRE-OPERATIVE CARDIOVASCULAR EXAMINATION: Primary | ICD-10-CM

## 2023-11-13 PROCEDURE — 93010 ELECTROCARDIOGRAM REPORT: CPT | Mod: S$PBB,,, | Performed by: INTERNAL MEDICINE

## 2023-11-13 PROCEDURE — 3079F DIAST BP 80-89 MM HG: CPT | Mod: CPTII,,, | Performed by: OBSTETRICS & GYNECOLOGY

## 2023-11-13 PROCEDURE — 93010 EKG 12-LEAD: ICD-10-PCS | Mod: S$PBB,,, | Performed by: INTERNAL MEDICINE

## 2023-11-13 PROCEDURE — 99397 PR PREVENTIVE VISIT,EST,65 & OVER: ICD-10-PCS | Mod: ,,, | Performed by: OBSTETRICS & GYNECOLOGY

## 2023-11-13 PROCEDURE — 93005 ELECTROCARDIOGRAM TRACING: CPT | Mod: PBBFAC | Performed by: INTERNAL MEDICINE

## 2023-11-13 PROCEDURE — 3079F PR MOST RECENT DIASTOLIC BLOOD PRESSURE 80-89 MM HG: ICD-10-PCS | Mod: CPTII,,, | Performed by: OBSTETRICS & GYNECOLOGY

## 2023-11-13 PROCEDURE — 3077F SYST BP >= 140 MM HG: CPT | Mod: CPTII,,, | Performed by: OBSTETRICS & GYNECOLOGY

## 2023-11-13 PROCEDURE — 3008F BODY MASS INDEX DOCD: CPT | Mod: CPTII,,, | Performed by: OBSTETRICS & GYNECOLOGY

## 2023-11-13 PROCEDURE — 3288F PR FALLS RISK ASSESSMENT DOCUMENTED: ICD-10-PCS | Mod: CPTII,,, | Performed by: OBSTETRICS & GYNECOLOGY

## 2023-11-13 PROCEDURE — 3077F PR MOST RECENT SYSTOLIC BLOOD PRESSURE >= 140 MM HG: ICD-10-PCS | Mod: CPTII,,, | Performed by: OBSTETRICS & GYNECOLOGY

## 2023-11-13 PROCEDURE — 3288F FALL RISK ASSESSMENT DOCD: CPT | Mod: CPTII,,, | Performed by: OBSTETRICS & GYNECOLOGY

## 2023-11-13 PROCEDURE — 99397 PER PM REEVAL EST PAT 65+ YR: CPT | Mod: ,,, | Performed by: OBSTETRICS & GYNECOLOGY

## 2023-11-13 PROCEDURE — 1101F PT FALLS ASSESS-DOCD LE1/YR: CPT | Mod: CPTII,,, | Performed by: OBSTETRICS & GYNECOLOGY

## 2023-11-13 PROCEDURE — 1101F PR PT FALLS ASSESS DOC 0-1 FALLS W/OUT INJ PAST YR: ICD-10-PCS | Mod: CPTII,,, | Performed by: OBSTETRICS & GYNECOLOGY

## 2023-11-13 PROCEDURE — 99212 OFFICE O/P EST SF 10 MIN: CPT | Mod: PBBFAC

## 2023-11-13 PROCEDURE — 3008F PR BODY MASS INDEX (BMI) DOCUMENTED: ICD-10-PCS | Mod: CPTII,,, | Performed by: OBSTETRICS & GYNECOLOGY

## 2023-11-13 RX ORDER — IBANDRONATE SODIUM 150 MG/1
150 TABLET, FILM COATED ORAL
Qty: 1 TABLET | Refills: 11 | Status: SHIPPED | OUTPATIENT
Start: 2023-11-13 | End: 2024-11-12

## 2023-11-13 NOTE — PROGRESS NOTES
CC: Well woman exam    Brandee Contreras is a 72 y.o. female No obstetric history on file. presents for well woman exam.    LMP: No LMP recorded. Patient is postmenopausal..    Last mammogram: 10/11/2022  Last Colonoscopy: 08/20/2019    Denies any issues, problems, or complaints.     Past Medical History:   Diagnosis Date    Hypertension      History reviewed. No pertinent surgical history.  Social History     Socioeconomic History    Marital status:    Tobacco Use    Smoking status: Former     Current packs/day: 0.25     Average packs/day: 0.3 packs/day for 2.8 years (0.7 ttl pk-yrs)     Types: Cigarettes     Start date: 02/2021     Passive exposure: Current    Smokeless tobacco: Never   Substance and Sexual Activity    Alcohol use: Never    Drug use: Never    Sexual activity: Yes     Family History   Problem Relation Age of Onset    Hypertension Maternal Grandmother      OB History    No obstetric history on file.         BP (!) 156/86   Pulse 88   Wt 61.1 kg (134 lb 12.8 oz)   BMI 23.88 kg/m²       ROS:  GENERAL: Denies weight gain or weight loss. Feeling well overall.   SKIN: Denies rash or lesions.   HEAD: Denies head injury or headache.   NODES: Denies enlarged lymph nodes.   CHEST: Denies chest pain or shortness of breath.   CARDIOVASCULAR: Denies palpitations or left sided chest pain.   ABDOMEN: No abdominal pain, constipation, diarrhea, nausea, vomiting or rectal bleeding.   URINARY: No frequency, dysuria, hematuria, or burning on urination.  REPRODUCTIVE: See HPI.   BREASTS: The patient performs breast self-examination and denies pain, lumps, or nipple discharge.   HEMATOLOGIC: No easy bruisability or excessive bleeding.   MUSCULOSKELETAL: Denies joint pain or swelling.   NEUROLOGIC: Denies syncope or weakness.   PSYCHIATRIC: Denies depression, anxiety or mood swings.    PHYSICAL EXAM:  APPEARANCE: Well nourished, well developed, in no acute distress.  AFFECT: WNL, alert and oriented x 3  SKIN:  No acne or hirsutism  NECK: Neck symmetric without masses or thyromegaly  NODES: No inguinal, cervical, axillary, or femoral lymph node enlargement  CHEST: Good respiratory effect  ABDOMEN: Soft.  No tenderness or masses.  No hepatosplenomegaly.  No hernias.  BREASTS: Symmetrical, no skin changes or visible lesions.  No palpable masses, nipple discharge bilaterally.  PELVIC: Normal external genitalia without lesions.  Normal hair distribution.  Adequate perineal body, normal urethral meatus.  Vagina moist and well rugated without lesions or discharge.  Cervix pink, without lesions, discharge or tenderness.  No significant cystocele or rectocele.  Bimanual exam shows uterus to be normal size, regular, mobile and nontender.  Adnexa without masses or tenderness.    EXTREMITIES: No edema.    Routine gynecological examination    Encounter for screening mammogram for high-risk patient  -     Mammo Digital Screening Bilat; Future; Expected date: 11/13/2023    Osteoporosis, unspecified osteoporosis type, unspecified pathological fracture presence  -     ibandronate (BONIVA) 150 mg tablet; Take 1 tablet (150 mg total) by mouth every 30 days.  Dispense: 1 tablet; Refill: 11            Patient was counseled today on A.C.S. Pap guidelines and recommendations for yearly pelvic exams, mammograms and monthly self breast exams; to see her PCP for other health maintenance.   Exercise regimen encouraged  Healthy food choices encouraged  Questions answered to desired level of satisfaction  Verbalized understanding to all information and instructions    Follow up in about 1 year (around 11/13/2024) for Annual.      Dirk Garrison M.D., FCOG    OB/GYN

## 2023-11-13 NOTE — ANESTHESIA PREPROCEDURE EVALUATION
11/13/2023  Brandee Contreras is a 72 y.o., female.      Pre-op Assessment    I have reviewed the Patient Summary Reports.     I have reviewed the Nursing Notes. I have reviewed the NPO Status.   I have reviewed the Medications.     Review of Systems  Anesthesia Hx:  No problems with previous Anesthesia             Denies Family Hx of Anesthesia complications.    Denies Personal Hx of Anesthesia complications.                    Social:  Smoker, No Alcohol Use       Cardiovascular:  Exercise tolerance: good   Hypertension               Seen by Dr. Mccray for preop risk stratification and optimization - RCRI class 1, considered low risk for MACE                         Musculoskeletal:  Arthritis                   Physical Exam  General: Well nourished, Cooperative and Alert    Airway:  Mallampati: II   Mouth Opening: Normal  TM Distance: Normal  Tongue: Normal  Neck ROM: Normal ROM    Dental:  Intact    Chest/Lungs:  Clear to auscultation, Normal Respiratory Rate    Heart:  Rate: Normal  Rhythm: Regular Rhythm        Chemistry        Component Value Date/Time     11/01/2023 0800    K 3.4 (L) 11/01/2023 0800     11/01/2023 0800    CO2 31 11/01/2023 0800    BUN 11 11/01/2023 0800    CREATININE 0.71 11/01/2023 0800     11/01/2023 0800        Component Value Date/Time    CALCIUM 9.8 11/01/2023 0800    ALKPHOS 67 11/01/2023 0800    AST 15 11/01/2023 0800    ALT 23 11/01/2023 0800    BILITOT 0.5 11/01/2023 0800        Lab Results   Component Value Date    WBC 4.70 11/01/2023    HGB 14.1 11/01/2023    HCT 42.9 11/01/2023     11/01/2023           Anesthesia Plan  Type of Anesthesia, risks & benefits discussed:    Anesthesia Type: Gen ETT, Spinal, Regional  Intra-op Monitoring Plan: Standard ASA Monitors  Post Op Pain Control Plan: multimodal analgesia  Induction:  IV  Airway Plan: Direct,  Post-Induction  Informed Consent: Informed consent signed with the Patient and all parties understand the risks and agree with anesthesia plan.  All questions answered.   ASA Score: 2  Day of Surgery Review of History & Physical: H&P Update referred to the surgeon/provider.I have interviewed and examined the patient. I have reviewed the patient's H&P dated: There are no significant changes.     Ready For Surgery From Anesthesia Perspective.     .

## 2023-11-14 ENCOUNTER — ANESTHESIA (OUTPATIENT)
Dept: SURGERY | Facility: HOSPITAL | Age: 73
End: 2023-11-14
Payer: COMMERCIAL

## 2023-11-14 ENCOUNTER — HOSPITAL ENCOUNTER (OUTPATIENT)
Facility: HOSPITAL | Age: 73
Discharge: HOME HEALTH | End: 2023-11-15
Attending: ORTHOPAEDIC SURGERY | Admitting: ORTHOPAEDIC SURGERY
Payer: COMMERCIAL

## 2023-11-14 DIAGNOSIS — M16.0 ARTHRITIS OF BOTH HIPS: ICD-10-CM

## 2023-11-14 DIAGNOSIS — I10 PRIMARY HYPERTENSION: ICD-10-CM

## 2023-11-14 DIAGNOSIS — Z41.9 SURGERY, ELECTIVE: ICD-10-CM

## 2023-11-14 DIAGNOSIS — M25.552 LEFT HIP PAIN: ICD-10-CM

## 2023-11-14 DIAGNOSIS — E87.6 HYPOKALEMIA DUE TO EXCESSIVE RENAL LOSS OF POTASSIUM: Primary | ICD-10-CM

## 2023-11-14 LAB
ANION GAP SERPL CALCULATED.3IONS-SCNC: 10 MMOL/L (ref 7–16)
BASOPHILS # BLD AUTO: 0.02 K/UL (ref 0–0.2)
BASOPHILS NFR BLD AUTO: 0.3 % (ref 0–1)
BUN SERPL-MCNC: 11 MG/DL (ref 7–18)
BUN/CREAT SERPL: 20 (ref 6–20)
CALCIUM SERPL-MCNC: 7.8 MG/DL (ref 8.5–10.1)
CHLORIDE SERPL-SCNC: 109 MMOL/L (ref 98–107)
CO2 SERPL-SCNC: 27 MMOL/L (ref 21–32)
CREAT SERPL-MCNC: 0.56 MG/DL (ref 0.55–1.02)
DIFFERENTIAL METHOD BLD: ABNORMAL
EGFR (NO RACE VARIABLE) (RUSH/TITUS): 96 ML/MIN/1.73M2
EOSINOPHIL # BLD AUTO: 0.03 K/UL (ref 0–0.5)
EOSINOPHIL NFR BLD AUTO: 0.4 % (ref 1–4)
ERYTHROCYTE [DISTWIDTH] IN BLOOD BY AUTOMATED COUNT: 12.5 % (ref 11.5–14.5)
GLUCOSE SERPL-MCNC: 137 MG/DL (ref 74–106)
HCT VFR BLD AUTO: 31.2 % (ref 38–47)
HGB BLD-MCNC: 10.5 G/DL (ref 12–16)
IMM GRANULOCYTES # BLD AUTO: 0.05 K/UL (ref 0–0.04)
IMM GRANULOCYTES NFR BLD: 0.7 % (ref 0–0.4)
INDIRECT COOMBS: NORMAL
LYMPHOCYTES # BLD AUTO: 0.77 K/UL (ref 1–4.8)
LYMPHOCYTES NFR BLD AUTO: 10.4 % (ref 27–41)
MCH RBC QN AUTO: 30.6 PG (ref 27–31)
MCHC RBC AUTO-ENTMCNC: 33.7 G/DL (ref 32–36)
MCV RBC AUTO: 91 FL (ref 80–96)
MONOCYTES # BLD AUTO: 0.12 K/UL (ref 0–0.8)
MONOCYTES NFR BLD AUTO: 1.6 % (ref 2–6)
MPC BLD CALC-MCNC: 10.1 FL (ref 9.4–12.4)
NEUTROPHILS # BLD AUTO: 6.38 K/UL (ref 1.8–7.7)
NEUTROPHILS NFR BLD AUTO: 86.6 % (ref 53–65)
NRBC # BLD AUTO: 0 X10E3/UL
NRBC, AUTO (.00): 0 %
PLATELET # BLD AUTO: 238 K/UL (ref 150–400)
POTASSIUM SERPL-SCNC: 2.8 MMOL/L (ref 3.5–5.1)
RBC # BLD AUTO: 3.43 M/UL (ref 4.2–5.4)
RH BLD: NORMAL
SODIUM SERPL-SCNC: 143 MMOL/L (ref 136–145)
SPECIMEN OUTDATE: NORMAL
WBC # BLD AUTO: 7.37 K/UL (ref 4.5–11)

## 2023-11-14 PROCEDURE — 63600175 PHARM REV CODE 636 W HCPCS: Performed by: ANESTHESIOLOGY

## 2023-11-14 PROCEDURE — 86901 BLOOD TYPING SEROLOGIC RH(D): CPT | Performed by: ORTHOPAEDIC SURGERY

## 2023-11-14 PROCEDURE — 88304 SURGICAL PATHOLOGY: ICD-10-PCS | Mod: 26,,, | Performed by: PATHOLOGY

## 2023-11-14 PROCEDURE — 25000003 PHARM REV CODE 250: Performed by: HOSPITALIST

## 2023-11-14 PROCEDURE — 25000003 PHARM REV CODE 250

## 2023-11-14 PROCEDURE — 88304 TISSUE EXAM BY PATHOLOGIST: CPT | Mod: 26,,, | Performed by: PATHOLOGY

## 2023-11-14 PROCEDURE — 0055T BONE SRGRY CMPTR CT/MRI IMAG: CPT | Mod: ,,, | Performed by: ORTHOPAEDIC SURGERY

## 2023-11-14 PROCEDURE — C1776 JOINT DEVICE (IMPLANTABLE): HCPCS | Performed by: ORTHOPAEDIC SURGERY

## 2023-11-14 PROCEDURE — 36000713 HC OR TIME LEV V EA ADD 15 MIN: Performed by: ORTHOPAEDIC SURGERY

## 2023-11-14 PROCEDURE — 97161 PT EVAL LOW COMPLEX 20 MIN: CPT

## 2023-11-14 PROCEDURE — 27201960 HC SPINAL TRAY

## 2023-11-14 PROCEDURE — 76942 ECHO GUIDE FOR BIOPSY: CPT | Mod: 26,,, | Performed by: ANESTHESIOLOGY

## 2023-11-14 PROCEDURE — 27000165 HC TUBE, ETT CUFFED

## 2023-11-14 PROCEDURE — 37000009 HC ANESTHESIA EA ADD 15 MINS: Performed by: ORTHOPAEDIC SURGERY

## 2023-11-14 PROCEDURE — 27000689 HC BLADE LARYNGOSCOPE ANY SIZE

## 2023-11-14 PROCEDURE — D9220A PRA ANESTHESIA: Mod: CRNA,,,

## 2023-11-14 PROCEDURE — 27000716 HC OXISENSOR PROBE, ANY SIZE

## 2023-11-14 PROCEDURE — 27130 PR TOTAL HIP ARTHROPLASTY: ICD-10-PCS | Mod: LT,,, | Performed by: ORTHOPAEDIC SURGERY

## 2023-11-14 PROCEDURE — 64450 PERIPHERAL BLOCK: ICD-10-PCS | Mod: 59,LT,, | Performed by: ANESTHESIOLOGY

## 2023-11-14 PROCEDURE — 36000712 HC OR TIME LEV V 1ST 15 MIN: Performed by: ORTHOPAEDIC SURGERY

## 2023-11-14 PROCEDURE — 71000039 HC RECOVERY, EACH ADD'L HOUR: Performed by: ORTHOPAEDIC SURGERY

## 2023-11-14 PROCEDURE — 88304 TISSUE EXAM BY PATHOLOGIST: CPT | Mod: TC,SUR | Performed by: ORTHOPAEDIC SURGERY

## 2023-11-14 PROCEDURE — D9220A PRA ANESTHESIA: Mod: ANES,,, | Performed by: ANESTHESIOLOGY

## 2023-11-14 PROCEDURE — C1713 ANCHOR/SCREW BN/BN,TIS/BN: HCPCS | Performed by: ORTHOPAEDIC SURGERY

## 2023-11-14 PROCEDURE — 99214 PR OFFICE/OUTPT VISIT, EST, LEVL IV, 30-39 MIN: ICD-10-PCS | Mod: ,,, | Performed by: HOSPITALIST

## 2023-11-14 PROCEDURE — 25000003 PHARM REV CODE 250: Performed by: ORTHOPAEDIC SURGERY

## 2023-11-14 PROCEDURE — 64450 NJX AA&/STRD OTHER PN/BRANCH: CPT | Mod: 59,LT,, | Performed by: ANESTHESIOLOGY

## 2023-11-14 PROCEDURE — 76942 PR U/S GUIDANCE FOR NEEDLE GUIDANCE: ICD-10-PCS | Mod: 26,,, | Performed by: ANESTHESIOLOGY

## 2023-11-14 PROCEDURE — 27200750 HC INSULATED NEEDLE/ STIMUPLEX

## 2023-11-14 PROCEDURE — 27201423 OPTIME MED/SURG SUP & DEVICES STERILE SUPPLY: Performed by: ORTHOPAEDIC SURGERY

## 2023-11-14 PROCEDURE — 94761 N-INVAS EAR/PLS OXIMETRY MLT: CPT

## 2023-11-14 PROCEDURE — 71000033 HC RECOVERY, INTIAL HOUR: Performed by: ORTHOPAEDIC SURGERY

## 2023-11-14 PROCEDURE — 85025 COMPLETE CBC W/AUTO DIFF WBC: CPT | Performed by: ORTHOPAEDIC SURGERY

## 2023-11-14 PROCEDURE — 63600175 PHARM REV CODE 636 W HCPCS

## 2023-11-14 PROCEDURE — 99214 OFFICE O/P EST MOD 30 MIN: CPT | Mod: ,,, | Performed by: HOSPITALIST

## 2023-11-14 PROCEDURE — 88311 PR  DECALCIFY TISSUE: ICD-10-PCS | Mod: 26,,, | Performed by: PATHOLOGY

## 2023-11-14 PROCEDURE — 63600175 PHARM REV CODE 636 W HCPCS: Performed by: ORTHOPAEDIC SURGERY

## 2023-11-14 PROCEDURE — 27000655

## 2023-11-14 PROCEDURE — 27130 TOTAL HIP ARTHROPLASTY: CPT | Mod: LT,,, | Performed by: ORTHOPAEDIC SURGERY

## 2023-11-14 PROCEDURE — D9220A PRA ANESTHESIA: ICD-10-PCS | Mod: ANES,,, | Performed by: ANESTHESIOLOGY

## 2023-11-14 PROCEDURE — 88311 DECALCIFY TISSUE: CPT | Mod: TC,SUR | Performed by: ORTHOPAEDIC SURGERY

## 2023-11-14 PROCEDURE — 99900035 HC TECH TIME PER 15 MIN (STAT)

## 2023-11-14 PROCEDURE — 27000510 HC BLANKET BAIR HUGGER ANY SIZE

## 2023-11-14 PROCEDURE — 37000008 HC ANESTHESIA 1ST 15 MINUTES: Performed by: ORTHOPAEDIC SURGERY

## 2023-11-14 PROCEDURE — 63600175 PHARM REV CODE 636 W HCPCS: Performed by: HOSPITALIST

## 2023-11-14 PROCEDURE — D9220A PRA ANESTHESIA: ICD-10-PCS | Mod: CRNA,,,

## 2023-11-14 PROCEDURE — 88311 DECALCIFY TISSUE: CPT | Mod: 26,,, | Performed by: PATHOLOGY

## 2023-11-14 PROCEDURE — 80048 BASIC METABOLIC PNL TOTAL CA: CPT | Performed by: ORTHOPAEDIC SURGERY

## 2023-11-14 PROCEDURE — 0055T PR COMPUTER-ASSIST MUSCSKEL NAVIG, ORTHO PROC, CT/MRI: ICD-10-PCS | Mod: ,,, | Performed by: ORTHOPAEDIC SURGERY

## 2023-11-14 DEVICE — TRIDENT II TRITANIUM CLUSTER 50D
Type: IMPLANTABLE DEVICE | Site: HIP | Status: FUNCTIONAL
Brand: TRIDENT II

## 2023-11-14 DEVICE — 127 DEGREE NECK ANGLE HIP STEM
Type: IMPLANTABLE DEVICE | Site: HIP | Status: FUNCTIONAL
Brand: ACCOLADE

## 2023-11-14 DEVICE — 6.5MM LOW PROFILE HEX SCREW 25MM
Type: IMPLANTABLE DEVICE | Site: HIP | Status: FUNCTIONAL
Brand: TRIDENT II

## 2023-11-14 DEVICE — LFIT V40 FEMORAL HEAD
Type: IMPLANTABLE DEVICE | Site: HIP | Status: FUNCTIONAL
Brand: V40 HEAD

## 2023-11-14 DEVICE — TRIDENT X3 10 DEGREE POLYETHYLENE INSERT
Type: IMPLANTABLE DEVICE | Site: HIP | Status: FUNCTIONAL
Brand: TRIDENT X3 INSERT

## 2023-11-14 RX ORDER — MORPHINE SULFATE 10 MG/ML
4 INJECTION INTRAMUSCULAR; INTRAVENOUS; SUBCUTANEOUS EVERY 5 MIN PRN
Status: DISCONTINUED | OUTPATIENT
Start: 2023-11-14 | End: 2023-11-14 | Stop reason: HOSPADM

## 2023-11-14 RX ORDER — LIDOCAINE HYDROCHLORIDE 20 MG/ML
INJECTION, SOLUTION EPIDURAL; INFILTRATION; INTRACAUDAL; PERINEURAL
Status: DISCONTINUED | OUTPATIENT
Start: 2023-11-14 | End: 2023-11-14

## 2023-11-14 RX ORDER — LIDOCAINE HYDROCHLORIDE 40 MG/ML
SOLUTION TOPICAL
Status: DISCONTINUED | OUTPATIENT
Start: 2023-11-14 | End: 2023-11-14

## 2023-11-14 RX ORDER — ONDANSETRON 2 MG/ML
4 INJECTION INTRAMUSCULAR; INTRAVENOUS DAILY PRN
Status: DISCONTINUED | OUTPATIENT
Start: 2023-11-14 | End: 2023-11-14 | Stop reason: HOSPADM

## 2023-11-14 RX ORDER — BUPIVACAINE HYDROCHLORIDE 7.5 MG/ML
INJECTION, SOLUTION EPIDURAL; RETROBULBAR
Status: COMPLETED | OUTPATIENT
Start: 2023-11-14 | End: 2023-11-14

## 2023-11-14 RX ORDER — HYDROMORPHONE HYDROCHLORIDE 2 MG/ML
0.5 INJECTION, SOLUTION INTRAMUSCULAR; INTRAVENOUS; SUBCUTANEOUS EVERY 5 MIN PRN
Status: DISCONTINUED | OUTPATIENT
Start: 2023-11-14 | End: 2023-11-14 | Stop reason: HOSPADM

## 2023-11-14 RX ORDER — POTASSIUM CHLORIDE 20 MEQ/1
40 TABLET, EXTENDED RELEASE ORAL ONCE
Status: COMPLETED | OUTPATIENT
Start: 2023-11-14 | End: 2023-11-14

## 2023-11-14 RX ORDER — DIPHENHYDRAMINE HYDROCHLORIDE 50 MG/ML
25 INJECTION INTRAMUSCULAR; INTRAVENOUS EVERY 6 HOURS PRN
Status: DISCONTINUED | OUTPATIENT
Start: 2023-11-14 | End: 2023-11-14 | Stop reason: HOSPADM

## 2023-11-14 RX ORDER — TRANEXAMIC ACID 100 MG/ML
INJECTION, SOLUTION INTRAVENOUS
Status: DISCONTINUED | OUTPATIENT
Start: 2023-11-14 | End: 2023-11-14

## 2023-11-14 RX ORDER — POTASSIUM CHLORIDE 7.45 MG/ML
40 INJECTION INTRAVENOUS ONCE
Status: COMPLETED | OUTPATIENT
Start: 2023-11-14 | End: 2023-11-14

## 2023-11-14 RX ORDER — PHENYLEPHRINE HYDROCHLORIDE 10 MG/ML
INJECTION INTRAVENOUS
Status: DISCONTINUED | OUTPATIENT
Start: 2023-11-14 | End: 2023-11-14

## 2023-11-14 RX ORDER — EPHEDRINE SULFATE 50 MG/ML
INJECTION, SOLUTION INTRAVENOUS
Status: DISCONTINUED | OUTPATIENT
Start: 2023-11-14 | End: 2023-11-14

## 2023-11-14 RX ORDER — MEPERIDINE HYDROCHLORIDE 25 MG/ML
25 INJECTION INTRAMUSCULAR; INTRAVENOUS; SUBCUTANEOUS ONCE AS NEEDED
Status: DISCONTINUED | OUTPATIENT
Start: 2023-11-14 | End: 2023-11-14 | Stop reason: HOSPADM

## 2023-11-14 RX ORDER — CEFAZOLIN SODIUM 1 G/3ML
INJECTION, POWDER, FOR SOLUTION INTRAMUSCULAR; INTRAVENOUS
Status: DISCONTINUED | OUTPATIENT
Start: 2023-11-14 | End: 2023-11-14

## 2023-11-14 RX ORDER — BISACODYL 10 MG
10 SUPPOSITORY, RECTAL RECTAL DAILY PRN
Status: DISCONTINUED | OUTPATIENT
Start: 2023-11-14 | End: 2023-11-15 | Stop reason: HOSPADM

## 2023-11-14 RX ORDER — FENTANYL CITRATE 50 UG/ML
INJECTION, SOLUTION INTRAMUSCULAR; INTRAVENOUS
Status: DISCONTINUED | OUTPATIENT
Start: 2023-11-14 | End: 2023-11-14

## 2023-11-14 RX ORDER — MUPIROCIN 20 MG/G
1 OINTMENT TOPICAL 2 TIMES DAILY
Status: DISCONTINUED | OUTPATIENT
Start: 2023-11-14 | End: 2023-11-15 | Stop reason: HOSPADM

## 2023-11-14 RX ORDER — SODIUM CHLORIDE 9 MG/ML
INJECTION, SOLUTION INTRAVENOUS CONTINUOUS
Status: DISCONTINUED | OUTPATIENT
Start: 2023-11-14 | End: 2023-11-15 | Stop reason: HOSPADM

## 2023-11-14 RX ORDER — TIZANIDINE 2 MG/1
4 TABLET ORAL ONCE
Status: COMPLETED | OUTPATIENT
Start: 2023-11-14 | End: 2023-11-14

## 2023-11-14 RX ORDER — DEXAMETHASONE SODIUM PHOSPHATE 4 MG/ML
INJECTION, SOLUTION INTRA-ARTICULAR; INTRALESIONAL; INTRAMUSCULAR; INTRAVENOUS; SOFT TISSUE
Status: DISCONTINUED | OUTPATIENT
Start: 2023-11-14 | End: 2023-11-14

## 2023-11-14 RX ORDER — SODIUM CHLORIDE 9 MG/ML
INJECTION, SOLUTION INTRAVENOUS CONTINUOUS
Status: DISCONTINUED | OUTPATIENT
Start: 2023-11-14 | End: 2023-11-14

## 2023-11-14 RX ORDER — ROPIVACAINE HYDROCHLORIDE 5 MG/ML
INJECTION, SOLUTION EPIDURAL; INFILTRATION; PERINEURAL
Status: COMPLETED | OUTPATIENT
Start: 2023-11-14 | End: 2023-11-14

## 2023-11-14 RX ORDER — KETOROLAC TROMETHAMINE 30 MG/ML
INJECTION, SOLUTION INTRAMUSCULAR; INTRAVENOUS
Status: DISCONTINUED | OUTPATIENT
Start: 2023-11-14 | End: 2023-11-14

## 2023-11-14 RX ORDER — HYDROCODONE BITARTRATE AND ACETAMINOPHEN 5; 325 MG/1; MG/1
1 TABLET ORAL EVERY 4 HOURS PRN
Status: DISCONTINUED | OUTPATIENT
Start: 2023-11-14 | End: 2023-11-15 | Stop reason: HOSPADM

## 2023-11-14 RX ORDER — CHLORTHALIDONE 25 MG/1
25 TABLET ORAL DAILY
Status: DISCONTINUED | OUTPATIENT
Start: 2023-11-15 | End: 2023-11-14

## 2023-11-14 RX ORDER — AMLODIPINE BESYLATE 5 MG/1
5 TABLET ORAL DAILY
Status: DISCONTINUED | OUTPATIENT
Start: 2023-11-15 | End: 2023-11-15 | Stop reason: HOSPADM

## 2023-11-14 RX ORDER — PROPOFOL 10 MG/ML
VIAL (ML) INTRAVENOUS
Status: DISCONTINUED | OUTPATIENT
Start: 2023-11-14 | End: 2023-11-14

## 2023-11-14 RX ORDER — MORPHINE SULFATE 4 MG/ML
4 INJECTION, SOLUTION INTRAMUSCULAR; INTRAVENOUS EVERY 4 HOURS PRN
Status: DISCONTINUED | OUTPATIENT
Start: 2023-11-14 | End: 2023-11-15 | Stop reason: HOSPADM

## 2023-11-14 RX ORDER — ONDANSETRON 2 MG/ML
INJECTION INTRAMUSCULAR; INTRAVENOUS
Status: DISCONTINUED | OUTPATIENT
Start: 2023-11-14 | End: 2023-11-14

## 2023-11-14 RX ORDER — ONDANSETRON 2 MG/ML
4 INJECTION INTRAMUSCULAR; INTRAVENOUS EVERY 8 HOURS PRN
Status: DISCONTINUED | OUTPATIENT
Start: 2023-11-14 | End: 2023-11-15 | Stop reason: HOSPADM

## 2023-11-14 RX ORDER — ROCURONIUM BROMIDE 10 MG/ML
INJECTION, SOLUTION INTRAVENOUS
Status: DISCONTINUED | OUTPATIENT
Start: 2023-11-14 | End: 2023-11-14

## 2023-11-14 RX ADMIN — SODIUM CHLORIDE: 9 INJECTION, SOLUTION INTRAVENOUS at 03:11

## 2023-11-14 RX ADMIN — TRANEXAMIC ACID 1000 MG: 100 INJECTION, SOLUTION INTRAVENOUS at 11:11

## 2023-11-14 RX ADMIN — EPHEDRINE SULFATE 5 MG: 50 INJECTION INTRAVENOUS at 09:11

## 2023-11-14 RX ADMIN — PHENYLEPHRINE HYDROCHLORIDE 100 MCG: 10 INJECTION INTRAVENOUS at 09:11

## 2023-11-14 RX ADMIN — PHENYLEPHRINE HYDROCHLORIDE 100 MCG: 10 INJECTION INTRAVENOUS at 10:11

## 2023-11-14 RX ADMIN — LIDOCAINE HYDROCHLORIDE 40 MG: 20 INJECTION, SOLUTION INTRAVENOUS at 08:11

## 2023-11-14 RX ADMIN — POTASSIUM CHLORIDE 40 MEQ: 7.46 INJECTION, SOLUTION INTRAVENOUS at 10:11

## 2023-11-14 RX ADMIN — SUGAMMADEX 200 MG: 100 INJECTION, SOLUTION INTRAVENOUS at 11:11

## 2023-11-14 RX ADMIN — ROPIVACAINE HYDROCHLORIDE 20 ML: 5 INJECTION, SOLUTION EPIDURAL; INFILTRATION; PERINEURAL at 08:11

## 2023-11-14 RX ADMIN — PHENYLEPHRINE HYDROCHLORIDE 200 MCG: 10 INJECTION INTRAVENOUS at 10:11

## 2023-11-14 RX ADMIN — FENTANYL CITRATE 100 MCG: 50 INJECTION INTRAMUSCULAR; INTRAVENOUS at 08:11

## 2023-11-14 RX ADMIN — PHENYLEPHRINE HYDROCHLORIDE 200 MCG: 10 INJECTION INTRAVENOUS at 08:11

## 2023-11-14 RX ADMIN — HYDROMORPHONE HYDROCHLORIDE 0.5 MG: 2 INJECTION INTRAMUSCULAR; INTRAVENOUS; SUBCUTANEOUS at 12:11

## 2023-11-14 RX ADMIN — TIZANIDINE 4 MG: 2 TABLET ORAL at 08:11

## 2023-11-14 RX ADMIN — DEXTROSE MONOHYDRATE 1000 MG: 50 INJECTION, SOLUTION INTRAVENOUS at 08:11

## 2023-11-14 RX ADMIN — BUPIVACAINE HYDROCHLORIDE 1.4 ML: 7.5 INJECTION, SOLUTION EPIDURAL; RETROBULBAR at 08:11

## 2023-11-14 RX ADMIN — SODIUM CHLORIDE: 9 INJECTION, SOLUTION INTRAVENOUS at 07:11

## 2023-11-14 RX ADMIN — EPHEDRINE SULFATE 20 MG: 50 INJECTION INTRAVENOUS at 08:11

## 2023-11-14 RX ADMIN — LIDOCAINE HYDROCHLORIDE 100 MG: 40 SOLUTION TOPICAL at 08:11

## 2023-11-14 RX ADMIN — PHENYLEPHRINE HYDROCHLORIDE 100 MCG: 10 INJECTION INTRAVENOUS at 08:11

## 2023-11-14 RX ADMIN — EPHEDRINE SULFATE 15 MG: 50 INJECTION INTRAVENOUS at 08:11

## 2023-11-14 RX ADMIN — TRANEXAMIC ACID 1000 MG: 100 INJECTION, SOLUTION INTRAVENOUS at 09:11

## 2023-11-14 RX ADMIN — KETOROLAC TROMETHAMINE 30 MG: 30 INJECTION, SOLUTION INTRAMUSCULAR at 11:11

## 2023-11-14 RX ADMIN — PROPOFOL 100 MG: 10 INJECTION, EMULSION INTRAVENOUS at 08:11

## 2023-11-14 RX ADMIN — VANCOMYCIN HYDROCHLORIDE 1000 MG: 1 INJECTION, POWDER, LYOPHILIZED, FOR SOLUTION INTRAVENOUS at 08:11

## 2023-11-14 RX ADMIN — PHENYLEPHRINE HYDROCHLORIDE 200 MCG: 10 INJECTION INTRAVENOUS at 09:11

## 2023-11-14 RX ADMIN — ONDANSETRON 4 MG: 2 INJECTION INTRAMUSCULAR; INTRAVENOUS at 08:11

## 2023-11-14 RX ADMIN — SODIUM CHLORIDE: 9 INJECTION, SOLUTION INTRAVENOUS at 10:11

## 2023-11-14 RX ADMIN — ROCURONIUM BROMIDE 50 MG: 10 INJECTION, SOLUTION INTRAVENOUS at 08:11

## 2023-11-14 RX ADMIN — CEFAZOLIN 2 G: 2 INJECTION, POWDER, FOR SOLUTION INTRAMUSCULAR; INTRAVENOUS at 04:11

## 2023-11-14 RX ADMIN — POTASSIUM CHLORIDE 40 MEQ: 1500 TABLET, EXTENDED RELEASE ORAL at 08:11

## 2023-11-14 RX ADMIN — CEFAZOLIN 2 G: 1 INJECTION, POWDER, FOR SOLUTION INTRAMUSCULAR; INTRAVENOUS; PARENTERAL at 08:11

## 2023-11-14 RX ADMIN — MUPIROCIN 1 G: 20 OINTMENT TOPICAL at 08:11

## 2023-11-14 RX ADMIN — DEXAMETHASONE SODIUM PHOSPHATE 8 MG: 4 INJECTION, SOLUTION INTRA-ARTICULAR; INTRALESIONAL; INTRAMUSCULAR; INTRAVENOUS; SOFT TISSUE at 08:11

## 2023-11-14 NOTE — ANESTHESIA PROCEDURE NOTES
Intubation    Date/Time: 11/14/2023 8:38 AM    Performed by: Reuben Tripp II, CRNA  Authorized by: Jeremiah Abdalla DO    Intubation:     Induction:  Intravenous    Intubated:  Postinduction    Mask Ventilation:  Easy mask    Attempts:  1    Attempted By:  CRNA    Method of Intubation:  Direct    Blade:  Cecil 3    Laryngeal View Grade: Grade IIA - cords partially seen      Difficult Airway Encountered?: No      Complications:  None    Airway Device:  Oral endotracheal tube    Airway Device Size:  7.0    Style/Cuff Inflation:  Cuffed    Inflation Amount (mL):  7    Tube secured:  21    Secured at:  The lips    Placement Verified By:  Capnometry    Complicating Factors:  None    Findings Post-Intubation:  BS equal bilateral and atraumatic/condition of teeth unchanged

## 2023-11-14 NOTE — INTERVAL H&P NOTE
The patient has been examined and the H&P has been reviewed:    I concur with the findings and no changes have occurred since H&P was written.    Surgery risks, benefits and alternative options discussed and understood by patient/family.          Active Hospital Problems    Diagnosis  POA    *Arthritis of both hips [M16.0]  Yes      Resolved Hospital Problems   No resolved problems to display.

## 2023-11-14 NOTE — PLAN OF CARE
Problem: Physical Therapy  Goal: Physical Therapy Goal  Description: Short term goals:  Pt will perform supine to sit with contact guard assistance  Pt will perform sit to stand with contact guard assistance  Pt will independently verbalize hip precautions  Pt will ambulate 50 ft with contact guard assistanceusing rolling walker    Long term goals:  Pt will perform supine to sit with modified independence  Pt will perform sit to stand with modified independence  Pt will ambulate 150 ft with modified independenceusing rolling walker    Outcome: Ongoing, Progressing

## 2023-11-14 NOTE — CONSULTS
Consult acknowledged for dc planning. Pt will dc home with family and [facility] home health. Rw to be delivered before dc by [facility]. Ss following.

## 2023-11-14 NOTE — ANESTHESIA PROCEDURE NOTES
Spinal    Diagnosis: left pedro  Patient location during procedure: OR  Start time: 11/14/2023 8:25 AM  Timeout: 11/14/2023 8:20 AM  End time: 11/14/2023 8:30 AM    Staffing  Authorizing Provider: Jeremiah Abdalla DO  Performing Provider: Jeremiah Abdalla DO    Staffing  Performed by: Jeremiah Abdalla DO  Authorized by: Jeremiah Abdalla DO    Preanesthetic Checklist  Completed: patient identified, IV checked, site marked, risks and benefits discussed, surgical consent, monitors and equipment checked, pre-op evaluation and timeout performed  Spinal Block  Patient position: sitting  Prep: ChloraPrep  Patient monitoring: heart rate, continuous pulse ox, continuous capnometry and frequent blood pressure checks  Approach: midline  Location: L4-5  Injection technique: single shot  CSF Fluid: clear free-flowing CSF  Needle  Needle type: Milli   Needle gauge: 24 G  Needle length: 3.5 in  Additional Documentation: incremental injection, negative aspiration for heme and no paresthesia on injection  Needle localization: anatomical landmarks  Assessment  Ease of block: easy  Patient's tolerance of the procedure: comfortable throughout block  Medications:    Medications: BUPivacaine (pf) (MARCAINE) injection 0.75% - Intraspinal   1.4 mL - 11/14/2023 8:30:00 AM

## 2023-11-14 NOTE — OP NOTE
[facility] - Orthopedic Periop Services  General Surgery  Operative Note    SUMMARY     Date of Procedure: 11/14/2023     Procedure: Procedure(s) (LRB):  ROBOTIC ARTHROPLASTY,HIP (Left)       Surgeon(s) and Role:     * He Young MD - Primary    Assisting Surgeon: None    Pre-Operative Diagnosis: Left hip pain [M25.552]    Post-Operative Diagnosis: Post-Op Diagnosis Codes:     * Left hip pain [M25.552]    Anesthesia: General    Operative Findings (including complications, if any):        OPERATIVE REPORT      PRE-OP DIAGNOSIS: Severe degenerative joint disease left hip.     POST-OP DIAGNOSIS: Severe degenerative joint disease left hip.    PROCEDURE:  Robotic-assisted left total hip arthroplasty.     SURGEON:  He Young M.D.    ASSISTING SURGEON:      ANESTHESIA:   General    COMPLICATIONS: None.    IMPLANTS PLACED:    Implant Name Type Inv. Item Serial No.  Lot No. LRB No. Used Action   PIN BONE 4 X 140MM STERILE - [id number]  PIN BONE 4 X 140MM STERILE  [employer]  Left 1 Implanted and Explanted   PIN BONE 4 X 140MM STERILE - [id number]  PIN BONE 4 X 140MM STERILE  [employer]  Left 1 Implanted and Explanted   SCREW TRIDENT II LP HEX 6.5X25 - [id number]  SCREW TRIDENT II LP HEX 6.5X25  [employer] FCT Left 1 Implanted   TRIDENT X3 POLY INSERT 32    PETE [id number] Left 1 Implanted   SHELL TRIDENT II CLUSTER 50MM - [id number]  SHELL TRIDENT II CLUSTER 50MM  [employer]. [id number] Left 1 Implanted   HEAD FEMORAL V40 -4X32MM COCR - [id number]  HEAD FEMORAL V40 -4X32MM COCR  [employer]. [id number] Left 1 Implanted   STEM FEMORAL ACCOL SZ 4 35X105 - [id number]  STEM FEMORAL ACCOL SZ 4 35X105  [employer]. [id number] Left 1 Implanted       ESTIMATED BLOOD LOSS:  150cc    INDICATION:  Patient is a 72 y.o. year old female with severe degenerative joint disease of the left hip needing total hip arthroplasty.    PROCEDURE IN DETAIL:  After having  the risks and benefits of the procedure explained at length to the patient and the patient stating that they understand the risks and benefits of the procedure and wishes to proceed with the procedure, written informed consent was obtained.  The patient was taken to the Operating room and placed in the supine position on the operative table at which time General was induced per anesthesia. The patient was then positioned into the lateral position with her hip up.  All bony prominences well padded with an axillary roll underneath her left axilla.  At this point, the patients left lower extremity was prepped and draped in sterile fashion up to above the pelvic bone.    At this point using the MAKOplasty robotic system a 10 - 15 centimeter incision was made centered over the greater trochanter going from the anterior superior iliac spine distally over the lateral aspect of the femur.  An incision was made with a #10 blade down through the skin and fascia donnie.  Hemostasis was maintained with a Bovie electrocautery.  At this point, a nick was made in the fascia donnie and using curved Massey scissors, the fascia donnie was incised along with an incision as well as splitting the gluteus maximums fascia in line with its fibers splitting the gluteus muscle.  This exposed the greater trochanter.  A self-retaining retractor was placed in and at this point, the greater trochanter and trochanteric bursa were exposed.  The trochanteric bursa was then removed with pick-ups and Bovie electrocautery exposing the gluteus medius and minimums tendons.  At this point, starting about 2 centimeter below the origin of the vastus lateralis and taking off the proximal one or two centimeters of vastus lateralis in line with the anterior aspect of the femur, coming proximally, the vastus lateralis as well as the gluteus medius tendon were removed off the anterior aspect of the greater trochanter up to the tip of the trochanter and then splitting  the gluteus fibers in line with the muscle fibers.  Two #5 Tycrons were placed as holding sutures to later use to repair the gluteus fascia back and tendon back to the greater trochanter.  Once the gluteus tendons were reflected this exposed the anterior aspect of the hip capsule and it was split in an H fashion taking it down off the base of the neck and then making a longitudinal split to the edge of the acetabulum on the inferior and superior borders of the femoral neck.  Stay suture was placed in each edge of this.  The #5 Tycron was later used to repair it back.  Once the capsule was reflected, the hip was externally rotated and the knee flexed and the hip dislocated anteriorly.    A 1.5 centimeter femoral neck cut was made marking the appropriate line of the neck cut with the cutting guide.  At this point, using an oscillating saw, the femoral neck was made.  The femoral head was removed. It was noted to be void of any articular cartilage on the superior weightbearing aspect.  At this point, the leg was placed back down on the table, Hohmann retractor was placed on the anterior and posterior aspect of the fascia acetabulum down on bone and using a Bovie electrocautery, the labrum of the acetabulum was removed as well as anterior and posterior osteophytes. At this point starting out with a size 44 millimeter reamer and reaming up the acetabulum was prepared down to bleeding bone. At this point it was irrigated out and a 50 millimeter trident II cup was press fit in the appropriate, approximate, 45 degree of inclination and 10-15 degrees of anterversion. It was impacted into position and firmly seated in the acetabulum.  At this point 1 acetabular screw was placed in. The neutral liner trial was then placed in.    The cookie cutter was used to open up the proximal femoral canal and then the canal finding reamer placed down.  It was then reamed with the  in the canal and then broached.  Once this was  dont the calcar was planed with the calcar planer.  It was placed into approximately 10 degrees of anterversion on the femoral stem.  Once this was done, with good control of the femur with the size 4 implant in place, a trial reduction was done with a -4 head 32 millimeter.  The hip was noted to be stable with a full range of motion.  At this point ht hip was dislocated, once again.  The acetabular trial liner was removed after the broach had removed the femoral broach.  A 10 degree lipped liner was then impacted into position.  Once it was firmly seated and impacted into position into the cup, the stem; the size 4 press fit Accolade II; was impacted into position in the femur. A trial reduction with a -4 head was done.  It was noted to have a full range of motion and be stable with no pistoning at 0 and 30 degrees.  At this point the hip was dislocated. The trial head was removed.  The Harman taper was cleaned with a wet followed by a dry lap sponge and the 32 millimeter -4 head was impacted into position.  The hip was relocated with no soft tissue in the cup.  It was noted to have a full range of motion and be a stable hip.    At this point the wound was irrigated out with pulsatile lavage. Two medium Hemovac drains were placed deep to the fascia.  Using a free needle and making bone tunnels in the trochanter, the anterior capsule was repaired back to the trochanter followed by the gluteus tendon.  The gluteus fascia as well as the vastus lateralis fascia was closed with a running #1 Vicryl.  The fascia donnie was closed with a running #1 Vicryl.  Subcuticular stitches of 2-0 Vicryl followed by skin staples were used to close the skin.  A sterile occlusive dressing was placed. Patient had an abduction pillow placed.  Intra-operative x-rays were obtained; AP pelvis; once the patient was in the supine position, once again showing both hips well reduced.  At this point the patient was then taken from the Operative  table and taken to the Recovery Room in good condition. All counts were correct.  There were no complications.           Description of Technical Procedures:     Significant Surgical Tasks Conducted by the Assistant(s), if Applicable:     Estimated Blood Loss (EBL): 150 mL           Implants:   Implant Name Type Inv. Item Serial No.  Lot No. LRB No. Used Action   PIN BONE 4 X 140MM STERILE - [id number]  PIN BONE 4 X 140MM STERILE  TUNDE [employer]  Left 1 Implanted and Explanted   PIN BONE 4 X 140MM STERILE - [id number]  PIN BONE 4 X 140MM STERILE  TUNDE [employer]GICAL  Left 1 Implanted and Explanted   SCREW TRIDENT II LP HEX 6.5X25 - [id number]  SCREW TRIDENT II LP HEX 6.5X25  PETE "[employer] AMARI. FCT Left 1 Implanted   TRIDENT X3 POLY INSERT 32    PETE [id number] Left 1 Implanted   SHELL TRIDENT II CLUSTER 50MM - [id number]  SHELL TRIDENT II CLUSTER 50MM  PETE "[employer] AMARI. [id number] Left 1 Implanted   HEAD FEMORAL V40 -4X32MM COCR - [id number]  HEAD FEMORAL V40 -4X32MM COCR  PETE "[employer] AMARI. [id number] Left 1 Implanted   STEM FEMORAL ACCOL SZ 4 35X105 - [id number]  STEM FEMORAL ACCOL SZ 4 35X105  PETE "[employer] AMARI. [id number] Left 1 Implanted       Specimens:   Specimen (24h ago, onward)       Start     Ordered    11/14/23 0828  Surgical Pathology  RELEASE UPON ORDERING         11/14/23 0828                            Condition: Good    Disposition: PACU - hemodynamically stable.    Attestation: I was present and scrubbed for the entire procedure.

## 2023-11-14 NOTE — ANESTHESIA PROCEDURE NOTES
Peripheral Block    Patient location during procedure: OR   Block not for primary anesthetic.  Reason for block: at surgeon's request and post-op pain management   Post-op Pain Location: left hip   Start time: 11/14/2023 8:30 AM  Timeout: 11/14/2023 8:25 AM   End time: 11/14/2023 8:35 AM    Staffing  Authorizing Provider: Jeremiah Abdalla DO  Performing Provider: Jeremiah Abdlala DO    Staffing  Performed by: Jeremiah Abdalla DO  Authorized by: Jeremiah Abdalla DO    Preanesthetic Checklist  Completed: patient identified, IV checked, site marked, risks and benefits discussed, surgical consent, monitors and equipment checked, pre-op evaluation and timeout performed  Peripheral Block  Patient position: supine  Prep: ChloraPrep  Patient monitoring: heart rate, continuous pulse ox, frequent blood pressure checks and continuous capnometry  Block type: fascia iliaca  Laterality: left  Injection technique: single shot  Needle  Needle type: Stimuplex   Needle gauge: 20 G  Needle length: 4 in  Needle localization: anatomical landmarks and ultrasound guidance   -ultrasound image captured on disc.  Assessment  Injection assessment: negative aspiration and local visualized surrounding nerve  Paresthesia pain: none  Heart rate change: no  Slow fractionated injection: yes  Pain Tolerance: comfortable throughout block  Medications:    Medications: ROPIvacaine (NAROPIN) injection 0.5% - Perineural   20 mL - 11/14/2023 8:35:00 AM

## 2023-11-14 NOTE — H&P
[facility] - Orthopedic Periop Services  Orthopedics  H&P    Patient Name: Brandee Contreras  MRN: [id number]  Admission Date: (Not on file)  Primary Care Provider: Sonia Morris NP    Patient information was obtained from patient and past medical records.     Subjective:     Principal Problem:<principal problem not specified>    Chief Complaint: No chief complaint on file.       HPI: 72-year-old female with severe degenerative joint disease left hip needing total hip arthroplasty on the left she is failed non operative treatment including injections has pain when she stands his at risk for falls  Left lower extremity she moves her toes has sensation to touch has palpable pulses she has limited motion on abduction adduction internal and external rotation of the left hip she does have full flexion of the hip to 90 comes to full extension.  There is crepitus on motion pain in the groin on motion  X-rays show severe degenerative changes left hip  Impression severe degenerative joint disease left hip  Plan total hip arthroplasty on left with MAKOplasty    Past Medical History:   Diagnosis Date    Hypertension        History reviewed. No pertinent surgical history.    Review of patient's allergies indicates:  No Known Allergies    No current facility-administered medications for this encounter.     Current Outpatient Medications   Medication Sig    amLODIPine (NORVASC) 5 MG tablet Take 1 tablet (5 mg total) by mouth once daily.    celecoxib (CELEBREX) 200 MG capsule TAKE ONE (1) CAPSULE BY MOUTH EVERY DAY  WITH FOOD    chlorthalidone (HYGROTEN) 25 MG Tab Take 1 tablet (25 mg total) by mouth once daily.    ibandronate (BONIVA) 150 mg tablet Take 1 tablet (150 mg total) by mouth every 30 days.    sulfamethoxazole-trimethoprim 800-160mg (BACTRIM DS) 800-160 mg Tab Take 1 tablet by mouth 2 (two) times daily. (Patient not taking: Reported on 11/13/2023)     Family History       Problem Relation (Age of Onset)     Hypertension Maternal Grandmother          Tobacco Use    Smoking status: Former     Current packs/day: 0.25     Average packs/day: 0.3 packs/day for 2.8 years (0.7 ttl pk-yrs)     Types: Cigarettes     Start date: 02/2021     Passive exposure: Current    Smokeless tobacco: Never   Substance and Sexual Activity    Alcohol use: Never    Drug use: Never    Sexual activity: Yes     Review of Systems   Constitutional: Negative for decreased appetite.   HENT:  Negative for congestion and ear discharge.    Eyes:  Negative for blurred vision.   Cardiovascular:  Negative for chest pain and syncope.   Respiratory:  Negative for cough and wheezing.    Endocrine: Negative for cold intolerance and polyuria.   Hematologic/Lymphatic: Negative for adenopathy and bleeding problem.   Skin:  Negative for color change, nail changes and suspicious lesions.   Musculoskeletal:  Positive for joint pain. Negative for muscle cramps and myalgias.   Gastrointestinal:  Negative for bloating and abdominal pain.   Genitourinary:  Negative for frequency and hematuria.   Neurological:  Negative for brief paralysis, sensory change and weakness.   Psychiatric/Behavioral:  Negative for altered mental status.    Allergic/Immunologic: Negative for hives.     Objective:     Vital Signs (Most Recent):    Vital Signs (24h Range):  Pulse:  [88] 88  BP: (156)/(86) 156/86           There is no height or weight on file to calculate BMI.    No intake or output data in the 24 hours ending 11/13/23 1821             Left Hip Exam     Tenderness   The patient tender to palpation of the trochanteric bursa.    Crepitus   The patient has crepitus of the trochanteric bursa.    Other   Sensation: normal          Vascular Exam       Left Pulses  Dorsalis Pedis:      2+             Significant Labs: All pertinent labs within the past 24 hours have been reviewed.    Significant Imaging: I have reviewed all pertinent imaging results/findings.  Assessment/Plan:     No notes  have been filed under this hospital service.  Service: Orthopedic Surgery      DUE - declined by patient  Family history is reviewed and has not changed     He Young MD  Orthopedics  [facility] - Orthopedic Periop Services

## 2023-11-14 NOTE — PLAN OF CARE
[facility] - Short Stay Unit  Initial Discharge Assessment       Primary Care Provider: Sonia Morris NP    Admission Diagnosis: Left hip pain [M25.552]  Arthritis of both hips [M16.0]    Admission Date: 11/14/2023  Expected Discharge Date:     Transition of Care Barriers: None    Payor: WELLCARE / Plan: WELL[employer] MEDICARE HMO / Product Type: Medicare Advantage /     Extended Emergency Contact Information  Primary Emergency Contact: bridger lopezheidy  Mobile Phone: [phone]  Relation: Mother  Preferred language: English   needed? No    Discharge Plan A: Home Health, Home with family  Discharge Plan B: Home Health, Home with family      [facility] [place] [place] - [address]  [address] [facility] [address]  Phone: [phone] Fax: [phone]      Initial Assessment (most recent)       Adult Discharge Assessment - 11/14/23 1428          Discharge Assessment    Assessment Type Discharge Planning Assessment     Source of Information patient     Communicated RCIARDO with patient/caregiver Yes     Reason For Admission arthritis of both hips     People in Home parent(s);sibling(s);other relative(s)     Facility Arrived From: home     Do you expect to return to your current living situation? Yes     Do you have help at home or someone to help you manage your care at home? Yes     Who are your caregiver(s) and their phone number(s)? Vivian Perkins (Sister) [phone]     Prior to hospitilization cognitive status: Unable to Assess     Current cognitive status: Alert/Oriented     Home Accessibility wheelchair accessible     Home Layout Able to live on 1st floor     Equipment Currently Used at Home none     Readmission within 30 days? No     Patient currently being followed by outpatient case management? No     Do you currently have service(s) that help you manage your care at home? No     Do you take prescription medications? Yes     Do you have prescription coverage? Yes     Do you  have any problems affording any of your prescribed medications? No     Is the patient taking medications as prescribed? yes     Who is going to help you get home at discharge? Vivian Perkins (Sister) [phone]     How do you get to doctors appointments? car, drives self;family or friend will provide     Are you on dialysis? No     Do you take coumadin? No     DME Needed Upon Discharge  walker, rolling     Discharge Plan discussed with: Patient;Sibling     Name(s) and Number(s) Vivian Perkins (Sister) [phone]     Transition of Care Barriers None     Discharge Plan A Home Health;Home with family     Discharge Plan B Home Health;Home with family        Physical Activity    On average, how many days per week do you engage in moderate to strenuous exercise (like a brisk walk)? 0 days     On average, how many minutes do you engage in exercise at this level? 0 min        Financial Resource Strain    How hard is it for you to pay for the very basics like food, housing, medical care, and heating? Not hard at all        Housing Stability    In the last 12 months, was there a time when you were not able to pay the mortgage or rent on time? No     In the last 12 months, how many places have you lived? 1     In the last 12 months, was there a time when you did not have a steady place to sleep or slept in a shelter (including now)? No        Transportation Needs    In the past 12 months, has lack of transportation kept you from medical appointments or from getting medications? No     In the past 12 months, has lack of transportation kept you from meetings, work, or from getting things needed for daily living? No        Food Insecurity    Within the past 12 months, you worried that your food would run out before you got the money to buy more. Never true     Within the past 12 months, the food you bought just didn't last and you didn't have money to get more. Never true        Stress    Do you feel stress - tense, restless,  nervous, or anxious, or unable to sleep at night because your mind is troubled all the time - these days? Only a little        Social Connections    In a typical week, how many times do you talk on the phone with family, friends, or neighbors? More than three times a week     How often do you get together with friends or relatives? More than three times a week     How often do you attend Mandaen or Restorationism services? More than 4 times per year     Do you belong to any clubs or organizations such as Mandaen groups, unions, fraternal or athletic groups, or school groups? No     How often do you attend meetings of the clubs or organizations you belong to? Never     Are you , , , , never , or living with a partner?         Alcohol Use    Q1: How often do you have a drink containing alcohol? Never     Q2: How many drinks containing alcohol do you have on a typical day when you are drinking? Patient does not drink     Q3: How often do you have six or more drinks on one occasion? Never        OTHER    Name(s) of People in Home Vivian Perkins (Sister) [phone]                    spoke with pt and Vivian Perkins (Sister) [phone]  at bedside to complete dcp initial assessment. Pta, pt lived home with mother, uncle, and sister. No hh, no dme. Dcp is to return home with family and [facility] home health. Pt will need rw. Referrals sending at this time.

## 2023-11-14 NOTE — OR NURSING
1152 Rec'd pt to PACU asleep with oral airway in place. No signs of distress noted, VSS. Left hip dressing C/D/I with hip abductor in place. Hemovac to left hip intact with bloody drainage noted. Left pedal pulses 2+, cap refill less than 3 seconds. Maradiaga to gravity patent, secure, intact with no kinks or obstructions noted. No needs. Will continue to monitor.     1157 Oral airway removed, respirations even and unlabored. Reoriented to surroundings. Denies pain/needs.     1218 Pt c/o pain 8/10. IV dilaudid given, see MAR for admin.     1250 Awaiting room to become available upstairs. Will hold in PACU.     1317 Out of PACU. VSS. No signs of bleeding/distress noted. Family notified of transfer to room 439.    1325 Pt to room 439 awake and alert with no distress noted, respirations even and unlabored. Family at bedside with pt belongings. Bedside report given to LOW Dow RN. Moved pt to regular bed with safety precautions in place. Left hip dressing C/D/I with hip abductor in place. Hemovac to left hip intact with bloody drainage noted. Left pedal pulses 2+, cap refill less than 3 seconds, able to wiggle toes on command. Maradiaga to gravity patent, secure, intact with no kinks or obstructions noted. States pain improving, denies other needs. /60, P 78, R 16, O2 95% RA, T 97.7 oral.

## 2023-11-14 NOTE — TRANSFER OF CARE
"Anesthesia Transfer of Care Note    Patient: Brandee Contreras    Procedure(s) Performed: Procedure(s) (LRB):  ROBOTIC ARTHROPLASTY,HIP (Left)    Patient location: PACU    Anesthesia Type: general, regional and spinal    Transport from OR: Transported from OR on 6-10 L/min O2 by face mask with adequate spontaneous ventilation    Post pain: adequate analgesia    Post assessment: no apparent anesthetic complications    Post vital signs: stable    Level of consciousness: responds to stimulation, awake and sedated    Nausea/Vomiting: no nausea/vomiting    Complications: none    Transfer of care protocol was followed    Last vitals: Visit Vitals  BP (!) 91/48 (BP Location: Right arm, Patient Position: Lying)   Pulse 80   Temp 36.4 °C (97.6 °F) (Oral)   Resp 15   Ht 5' 4" (1.626 m)   Wt 61.2 kg (135 lb)   SpO2 100%   Breastfeeding No   BMI 23.17 kg/m²     "

## 2023-11-14 NOTE — SUBJECTIVE & OBJECTIVE
Past Medical History:   Diagnosis Date    Hypertension        History reviewed. No pertinent surgical history.    Review of patient's allergies indicates:  No Known Allergies    No current facility-administered medications for this encounter.     Current Outpatient Medications   Medication Sig    amLODIPine (NORVASC) 5 MG tablet Take 1 tablet (5 mg total) by mouth once daily.    celecoxib (CELEBREX) 200 MG capsule TAKE ONE (1) CAPSULE BY MOUTH EVERY DAY  WITH FOOD    chlorthalidone (HYGROTEN) 25 MG Tab Take 1 tablet (25 mg total) by mouth once daily.    ibandronate (BONIVA) 150 mg tablet Take 1 tablet (150 mg total) by mouth every 30 days.    sulfamethoxazole-trimethoprim 800-160mg (BACTRIM DS) 800-160 mg Tab Take 1 tablet by mouth 2 (two) times daily. (Patient not taking: Reported on 11/13/2023)     Family History       Problem Relation (Age of Onset)    Hypertension Maternal Grandmother          Tobacco Use    Smoking status: Former     Current packs/day: 0.25     Average packs/day: 0.3 packs/day for 2.8 years (0.7 ttl pk-yrs)     Types: Cigarettes     Start date: 02/2021     Passive exposure: Current    Smokeless tobacco: Never   Substance and Sexual Activity    Alcohol use: Never    Drug use: Never    Sexual activity: Yes     Review of Systems   Constitutional: Negative for decreased appetite.   HENT:  Negative for congestion and ear discharge.    Eyes:  Negative for blurred vision.   Cardiovascular:  Negative for chest pain and syncope.   Respiratory:  Negative for cough and wheezing.    Endocrine: Negative for cold intolerance and polyuria.   Hematologic/Lymphatic: Negative for adenopathy and bleeding problem.   Skin:  Negative for color change, nail changes and suspicious lesions.   Musculoskeletal:  Positive for joint pain. Negative for muscle cramps and myalgias.   Gastrointestinal:  Negative for bloating and abdominal pain.   Genitourinary:  Negative for frequency and hematuria.   Neurological:  Negative  for brief paralysis, sensory change and weakness.   Psychiatric/Behavioral:  Negative for altered mental status.    Allergic/Immunologic: Negative for hives.     Objective:     Vital Signs (Most Recent):    Vital Signs (24h Range):  Pulse:  [88] 88  BP: (156)/(86) 156/86           There is no height or weight on file to calculate BMI.    No intake or output data in the 24 hours ending 11/13/23 1821             Left Hip Exam     Tenderness   The patient tender to palpation of the trochanteric bursa.    Crepitus   The patient has crepitus of the trochanteric bursa.    Other   Sensation: normal          Vascular Exam       Left Pulses  Dorsalis Pedis:      2+             Significant Labs: All pertinent labs within the past 24 hours have been reviewed.    Significant Imaging: I have reviewed all pertinent imaging results/findings.

## 2023-11-14 NOTE — PT/OT/SLP EVAL
Physical Therapy Evaluation     Patient Name: Brandee Contreras   MRN: [id number]  Recent Surgery: Procedure(s) (LRB):  ROBOTIC ARTHROPLASTY,HIP (Left) Day of Surgery    Recommendations:     Discharge Recommendations: Low Intensity Therapy   Discharge Equipment Recommendations: walker, rolling   Barriers to discharge: Increased level of assist    Assessment:     Brandee Contreras is a 72 y.o. female admitted with a medical diagnosis of Arthritis of both hips. She presents with the following impairments/functional limitations: weakness, impaired functional mobility, gait instability, decreased lower extremity function, pain, decreased ROM, orthopedic precautions. Pt presents with new left total hip replacement. She reports minimal pain. She required some assistance with bed mobility due to post op weakness but was able to ambulate a short distance with rolling walker while maintain PWB. She will require assistance from family at discharge    Rehab Prognosis: Good; patient would benefit from acute PT services to address these deficits and reach maximum level of function.    Plan:     During this hospitalization, patient to be seen BID (5x/wk, daily 2x/wk) to address the above listed problems via gait training, therapeutic activities, therapeutic exercises    Plan of Care Expires: 12/14/23    Subjective     Chief Complaint: left total hip replacement   Patient Comments/Goals: Pt is agreeable to PT   Pain/Comfort:  Pain Rating 1: 0/10  Location - Side 1: Left  Location 1: hip  Pain Addressed 1: Pre-medicate for activity  Pain Rating Post-Intervention 1: 3/10    Social History:  Living Environment: Patient lives with their mother in a single story home with ramped  Prior Level of Function: Prior to admission, patient was independent  Equipment Used at Home: none  DME owned (not currently used):  raised toilet  Assistance Upon Discharge: family    Objective:     Communicated with LOW Dow RN prior to session. Patient found  HOB elevated with peripheral IV, hip abduction pillow, blood pressure cuff, blas catheter, hemovac upon PT entry to room.    General Precautions: Standard, fall   Orthopedic Precautions: LLE posterior precautions, LLE partial weight bearing   Braces: N/A    Respiratory Status: Room air    Exams:  Cognition: Patient is oriented to Person, Place, Time, Situation  RLE ROM: WFL  RLE Strength: WFL  LLE ROM:  limited by hip precautions  LLE Strength:  3/5  Gross Motor Coordination: WFL  Sensation:    -       Intact    Functional Mobility:  Gait belt applied - Yes  Bed Mobility  Scooting: minimum assistance  Supine to Sit: minimum assistance for LE management  Transfers  Sit to Stand: minimum assistance with rolling walker  Bed to Chair: minimum assistance with rolling walker using Step Transfer  Gait  Patient ambulated 12 ft with rolling walker and contact guard assistance. Patient demonstrates decreased step length, decreased foot clearance, decreased sushma, and antalgic gait. Increased time allowed. All lines remained intact throughout ambulation trail.  Balance  Sitting: contact guard assistance  Standing: contact guard assistance      Therapeutic Activities and Exercises:   Patient educated on role of acute care PT and PT POC, safety while in hospital including calling nurse for mobility, and call light usage  Patient educated about importance of OOB mobility and remaining up in chair most of the day.      AM-PAC 6 CLICK MOBILITY  Total Score:18    Patient left up in chair with all lines intact and call button in reach.    GOALS:   Multidisciplinary Problems       Physical Therapy Goals          Problem: Physical Therapy    Goal Priority Disciplines Outcome Goal Variances Interventions   Physical Therapy Goal     PT, PT/OT Ongoing, Progressing     Description: Short term goals:  Pt will perform supine to sit with contact guard assistance  Pt will perform sit to stand with contact guard assistance  Pt will  independently verbalize hip precautions  Pt will ambulate 50 ft with contact guard assistanceusing rolling walker    Long term goals:  Pt will perform supine to sit with modified independence  Pt will perform sit to stand with modified independence  Pt will ambulate 150 ft with modified independenceusing rolling walker                         History:     Past Medical History:   Diagnosis Date    Arthritis     Hypertension        Past Surgical History:   Procedure Laterality Date    VAGINAL DELIVERY         Time Tracking:     PT Received On: 11/14/23  PT Start Time: 1442  PT Stop Time: 1502  PT Total Time (min): 20 min     Billable Minutes: Evaluation low complexity    11/14/2023

## 2023-11-14 NOTE — HPI
72-year-old female with severe degenerative joint disease left hip needing total hip arthroplasty on the left she is failed non operative treatment including injections has pain when she stands his at risk for falls  Left lower extremity she moves her toes has sensation to touch has palpable pulses she has limited motion on abduction adduction internal and external rotation of the left hip she does have full flexion of the hip to 90 comes to full extension.  There is crepitus on motion pain in the groin on motion  X-rays show severe degenerative changes left hip  Impression severe degenerative joint disease left hip  Plan total hip arthroplasty on left with MAKOplasty

## 2023-11-15 VITALS
OXYGEN SATURATION: 100 % | SYSTOLIC BLOOD PRESSURE: 122 MMHG | HEIGHT: 64 IN | WEIGHT: 135 LBS | TEMPERATURE: 98 F | DIASTOLIC BLOOD PRESSURE: 56 MMHG | HEART RATE: 92 BPM | RESPIRATION RATE: 18 BRPM | BODY MASS INDEX: 23.05 KG/M2

## 2023-11-15 LAB
ALBUMIN SERPL BCP-MCNC: 2.7 G/DL (ref 3.5–5)
ALBUMIN/GLOB SERPL: 0.9 {RATIO}
ALP SERPL-CCNC: 54 U/L (ref 55–142)
ALT SERPL W P-5'-P-CCNC: 27 U/L (ref 13–56)
ANION GAP SERPL CALCULATED.3IONS-SCNC: 8 MMOL/L (ref 7–16)
AST SERPL W P-5'-P-CCNC: 33 U/L (ref 15–37)
BASOPHILS # BLD AUTO: 0.01 K/UL (ref 0–0.2)
BASOPHILS NFR BLD AUTO: 0.1 % (ref 0–1)
BILIRUB SERPL-MCNC: 0.3 MG/DL (ref ?–1.2)
BUN SERPL-MCNC: 13 MG/DL (ref 7–18)
BUN/CREAT SERPL: 22 (ref 6–20)
CALCIUM SERPL-MCNC: 8 MG/DL (ref 8.5–10.1)
CHLORIDE SERPL-SCNC: 106 MMOL/L (ref 98–107)
CO2 SERPL-SCNC: 30 MMOL/L (ref 21–32)
CREAT SERPL-MCNC: 0.59 MG/DL (ref 0.55–1.02)
DIFFERENTIAL METHOD BLD: ABNORMAL
EGFR (NO RACE VARIABLE) (RUSH/TITUS): 95 ML/MIN/1.73M2
EOSINOPHIL # BLD AUTO: 0 K/UL (ref 0–0.5)
EOSINOPHIL NFR BLD AUTO: 0 % (ref 1–4)
ERYTHROCYTE [DISTWIDTH] IN BLOOD BY AUTOMATED COUNT: 12.6 % (ref 11.5–14.5)
GLOBULIN SER-MCNC: 2.9 G/DL (ref 2–4)
GLUCOSE SERPL-MCNC: 99 MG/DL (ref 74–106)
HCT VFR BLD AUTO: 31.5 % (ref 38–47)
HGB BLD-MCNC: 10.5 G/DL (ref 12–16)
IMM GRANULOCYTES # BLD AUTO: 0.04 K/UL (ref 0–0.04)
IMM GRANULOCYTES NFR BLD: 0.4 % (ref 0–0.4)
LYMPHOCYTES # BLD AUTO: 0.89 K/UL (ref 1–4.8)
LYMPHOCYTES NFR BLD AUTO: 9.5 % (ref 27–41)
MAGNESIUM SERPL-MCNC: 2.1 MG/DL (ref 1.7–2.3)
MCH RBC QN AUTO: 30.5 PG (ref 27–31)
MCHC RBC AUTO-ENTMCNC: 33.3 G/DL (ref 32–36)
MCV RBC AUTO: 91.6 FL (ref 80–96)
MONOCYTES # BLD AUTO: 0.72 K/UL (ref 0–0.8)
MONOCYTES NFR BLD AUTO: 7.7 % (ref 2–6)
MPC BLD CALC-MCNC: 10.1 FL (ref 9.4–12.4)
NEUTROPHILS # BLD AUTO: 7.73 K/UL (ref 1.8–7.7)
NEUTROPHILS NFR BLD AUTO: 82.3 % (ref 53–65)
NRBC # BLD AUTO: 0 X10E3/UL
NRBC, AUTO (.00): 0 %
PLATELET # BLD AUTO: 239 K/UL (ref 150–400)
POTASSIUM SERPL-SCNC: 3.9 MMOL/L (ref 3.5–5.1)
PROT SERPL-MCNC: 5.6 G/DL (ref 6.4–8.2)
RBC # BLD AUTO: 3.44 M/UL (ref 4.2–5.4)
SODIUM SERPL-SCNC: 140 MMOL/L (ref 136–145)
WBC # BLD AUTO: 9.39 K/UL (ref 4.5–11)

## 2023-11-15 PROCEDURE — 99214 PR OFFICE/OUTPT VISIT, EST, LEVL IV, 30-39 MIN: ICD-10-PCS | Mod: ,,, | Performed by: HOSPITALIST

## 2023-11-15 PROCEDURE — 94761 N-INVAS EAR/PLS OXIMETRY MLT: CPT | Mod: GZ

## 2023-11-15 PROCEDURE — 99214 OFFICE O/P EST MOD 30 MIN: CPT | Mod: ,,, | Performed by: HOSPITALIST

## 2023-11-15 PROCEDURE — 97165 OT EVAL LOW COMPLEX 30 MIN: CPT

## 2023-11-15 PROCEDURE — 85025 COMPLETE CBC W/AUTO DIFF WBC: CPT | Performed by: ORTHOPAEDIC SURGERY

## 2023-11-15 PROCEDURE — 25000003 PHARM REV CODE 250: Performed by: ORTHOPAEDIC SURGERY

## 2023-11-15 PROCEDURE — 63600175 PHARM REV CODE 636 W HCPCS: Performed by: ORTHOPAEDIC SURGERY

## 2023-11-15 PROCEDURE — 83735 ASSAY OF MAGNESIUM: CPT | Performed by: HOSPITALIST

## 2023-11-15 PROCEDURE — 97116 GAIT TRAINING THERAPY: CPT

## 2023-11-15 PROCEDURE — 97110 THERAPEUTIC EXERCISES: CPT

## 2023-11-15 PROCEDURE — 80053 COMPREHEN METABOLIC PANEL: CPT | Performed by: ORTHOPAEDIC SURGERY

## 2023-11-15 RX ORDER — HYDROCODONE BITARTRATE AND ACETAMINOPHEN 10; 325 MG/1; MG/1
1 TABLET ORAL EVERY 6 HOURS PRN
Qty: 28 TABLET | Refills: 0 | Status: SHIPPED | OUTPATIENT
Start: 2023-11-15

## 2023-11-15 RX ADMIN — CEFAZOLIN 2 G: 2 INJECTION, POWDER, FOR SOLUTION INTRAMUSCULAR; INTRAVENOUS at 12:11

## 2023-11-15 RX ADMIN — HYDROCODONE BITARTRATE AND ACETAMINOPHEN 1 TABLET: 5; 325 TABLET ORAL at 09:11

## 2023-11-15 RX ADMIN — AMLODIPINE BESYLATE 5 MG: 5 TABLET ORAL at 09:11

## 2023-11-15 RX ADMIN — MUPIROCIN 1 G: 20 OINTMENT TOPICAL at 09:11

## 2023-11-15 RX ADMIN — SODIUM CHLORIDE: 9 INJECTION, SOLUTION INTRAVENOUS at 05:11

## 2023-11-15 RX ADMIN — APIXABAN 2.5 MG: 2.5 TABLET, FILM COATED ORAL at 09:11

## 2023-11-15 NOTE — ASSESSMENT & PLAN NOTE
Chronic, controlled. Latest blood pressure and vitals reviewed-     Temp:  [97.6 °F (36.4 °C)-98.3 °F (36.8 °C)]   Pulse:  [76-86]   Resp:  [10-19]   BP: ()/(48-75)   SpO2:  [96 %-100 %] .   Home meds for hypertension were reviewed and noted below.   Hypertension Medications               amLODIPine (NORVASC) 5 MG tablet Take 1 tablet (5 mg total) by mouth once daily.    chlorthalidone (HYGROTEN) 25 MG Tab Take 1 tablet (25 mg total) by mouth once daily.            While in the hospital, will manage blood pressure as follows; Adjust home antihypertensive regimen as follows- stop chlorothaladone.      Will utilize p.r.n. blood pressure medication only if patient's blood pressure greater than 140/90 and she develops symptoms such as worsening chest pain or shortness of breath.

## 2023-11-15 NOTE — HPI
71 yo F underwent LTHR today.  No complaints of pain LLE but has muscle cramp from right thigh to calf.  Could be related to her hypokalemia which is most likely from her chlorthalidone.  Will replace and hold diuretic and continue CCB and have patient f/u with her PCP.      ROS as below.  Mild fullness in throat which she states is from intubation but no dysphagia or dyspepsia.

## 2023-11-15 NOTE — PLAN OF CARE
[facility] - Short Stay Unit  Discharge Final Note    Primary Care Provider: Sonia Morris NP    Expected Discharge Date: 11/15/2023    Final Discharge Note (most recent)       Final Note - 11/15/23 1058          Final Note    Assessment Type Final Discharge Note     Anticipated Discharge Disposition Home-Health Care [facility] [place]     What phone number can be called within the next 1-3 days to see how you are doing after discharge? [phone]        Post-Acute Status    Post-Acute Authorization HME;Home Health     HME Status Set-up Complete/Auth obtained     Home Health Status Set-up Complete/Auth obtained     Patient choice form signed by patient/caregiver List with quality metrics by geographic area provided;List from CMS Compare;List from System Post-Acute Care     Discharge Delays None known at this time                      Follow-up providers       He Young MD   Specialty: Orthopedic Surgery    [address]  [address]  [facility], Orthopedic [facility], [facility] [address]   Phone: [phone]       Next Steps: Follow up in 3 week(s)              After-discharge care                Durable Medical Equipment       [facility]   Service: Durable Medical Equipment    [address]  [facility] [address]   Phone: [phone]                 Home Medical Care       [facility]   Service: Home Health Services    [address]  [address]  [facility] [address]   Phone: [phone]                             Pt to dc home with [facility] and rw from [facility]. No further dc needs noted.

## 2023-11-15 NOTE — PT/OT/SLP EVAL
Occupational Therapy Evaluation     Name: Brandee Contreras  MRN: [id number]  Admitting Diagnosis: Arthritis of both hips 1 Day Post-Op  Recent Surgery: Procedure(s) (LRB):  ROBOTIC ARTHROPLASTY,HIP (Left) 1 Day Post-Op    Recommendations:     Discharge Recommendations: Low Intensity Therapy  Level of Assistance Recommended: Intermittent assistance  Discharge Equipment Recommendations: walker, rolling  Barriers to discharge: None    Assessment:     Brandee Contreras is a 72 y.o. female with a medical diagnosis of Arthritis of both hips. She presents with performance deficits affecting function including impaired endurance, impaired self care skills, impaired functional mobility, gait instability, pain, decreased ROM, orthopedic precautions. Pt is painful today, states that she did not take any pain medication since yesterday    Rehab Prognosis: Good; patient would benefit from acute OT services to address these deficits and reach maximum level of function.    Plan:     Patient to be seen 5 x/week to address the above listed problems via self-care/home management, therapeutic activities, therapeutic exercises  Plan of Care Expires: 11/22/23  Plan of Care Reviewed with: patient    Subjective     Chief Complaint: Pt is post op day 1 (L) THR  Patient Comments/Goals: Pt plans to return home with family today  Pain/Comfort:  Pain Rating 1: 5/10  Location - Side 1: Left  Location 1: hip  Pain Addressed 1: Pre-medicate for activity  Pain Rating Post-Intervention 1: 5/10    Patients cultural, spiritual, Buddhist conflicts given the current situation: no    Social History:  Living Environment: Patient lives with their uncle and mother in a single story home with ramped  Prior Level of Function: Prior to admission, patient was independent.  Roles and Routines: Patient was driving prior to admission.  Equipment Used at Home: none  DME owned (not currently used): none  Assistance Upon Discharge:  Pt's sister will be staying  with her    Objective:     Communicated with MIMI Hickey prior to session. Patient found HOB elevated with peripheral IV, SCD upon OT entry to room.    General Precautions: Standard, fall   Orthopedic Precautions: LLE anterior precautions, LLE partial weight bearing, LLE abduction precautions   Braces: N/A    Respiratory Status: Room air    Occupational Performance    Gait belt applied - Yes    Bed Mobility:   Supine to sit from right side of bed with minimum assistance and verbal cues and increased time and effort    Functional Mobility/Transfers:  Sit <> Stand Transfer with contact guard assistance with rolling walker  Bed <> Chair Transfer using Step Transfer technique with contact guard assistance with rolling walker  Toilet Transfer Step Transfer technique with contact guard assistance with rolling walker  Functional Mobility: Pt required increased time and effort to advance her (L) LE.    Activities of Daily Living:  Bathing: Pt washed her face with setup, stood to wash her perineum with SBA with RW and setup  Upper Body Dressing: independence  Lower Body Dressing: minimum assistance using reacher to don panties and pants    Cognitive/Visual Perceptual:  Cognitive/Psychosocial Skills:    -     Oriented to: Person, Place, Time, Situation  -     Follows Commands/attention: Follows one-step commands  -     Communication: clear/fluent  -     Safety awareness/insight to disability: intact  -     Mood/Affect/Coping skills/emotional control: Cooperative and Pleasant    Physical Exam:  Balance:    -     Sitting: supervision  -     Standing: contact guard assistance  Dominant hand: Right  Upper Extremity Range of Motion:     -       Right Upper Extremity: WNL  -       Left Upper Extremity: WNL  Upper Extremity Strength:    -       Right Upper Extremity: WNL  -       Left Upper Extremity: WNL   Strength:    -       Right Upper Extremity: WNL  -       Left Upper Extremity: WNL  Fine Motor Coordination:    -        Intact  Gross motor coordination:   WFL    AMPAC 6 Click ADL:  AMPAC Total Score: 20    Treatment & Education:  Educated on the importance of mobility to maximize recovery  Educated on Anterior hip precautions - patient recalled 3 hip precautions  Educated on use of hip kit during LB dressing  Educated on safety with functional mobility; hand placement to ensure safe transfers to various surfaces in prep for ADLs  Educated on performing functional mobility and ADLs in adherence to orthopedic precautions  Will continue to educate as needed  OT treatment plan    Patient clear to ambulate in hallways with RN/PCT.    Patient left up in chair with all lines intact, call button in reach, and RN notified.    GOALS:   Multidisciplinary Problems       Occupational Therapy Goals          Problem: Occupational Therapy    Goal Priority Disciplines Outcome Interventions   Occupational Therapy Goal     OT, PT/OT Ongoing, Progressing    Description:   STG: (in 1 week)  Pt will bathe with setup and CGA assistance with bath sponge  Pt will perform UE dressing with independence  Pt will perform LE dressing with CGA using reacher and sockaid and long handled shoe horn   Pt will state and adhere to THR precautions during self care and mobility  Pt will t/f bed/chair/bsc with SBA using RW  Perform standing task x 5 min with supervision assistance using RW  Tolerate 15 min of tx without fatigue.    LTG: (in 4 weeks)  Restore to max I with selfcare and mobility.                          History:     Past Medical History:   Diagnosis Date    Arthritis     Hypertension     Nicotine dependence          Past Surgical History:   Procedure Laterality Date    JOINT REPLACEMENT Left 11/14/2023    LTHR     Dr. He Young    ROBOTIC ARTHROPLASTY, HIP Left 11/14/2023    Procedure: ROBOTIC ARTHROPLASTY,HIP;  Surgeon: He Young MD;  Location: [facility];  Service: Orthopedics;  Laterality: Left;    VAGINAL DELIVERY         Time  Tracking:     OT Date of Treatment: 11/15/23  OT Start Time: 0914  OT Stop Time: 1023  OT Total Time (min): 69 min    Billable Minutes: Evaluation low complexity    11/15/2023

## 2023-11-15 NOTE — SUBJECTIVE & OBJECTIVE
Past Medical History:   Diagnosis Date    Arthritis     Hypertension        Past Surgical History:   Procedure Laterality Date    JOINT REPLACEMENT Left 11/14/2023    [facility]     Dr. He Young    VAGINAL DELIVERY         Review of patient's allergies indicates:  No Known Allergies    No current facility-administered medications on file prior to encounter.     Current Outpatient Medications on File Prior to Encounter   Medication Sig    celecoxib (CELEBREX) 200 MG capsule TAKE ONE (1) CAPSULE BY MOUTH EVERY DAY  WITH FOOD     Family History       Problem Relation (Age of Onset)    Hypertension Maternal Grandmother          Tobacco Use    Smoking status: Former     Current packs/day: 0.25     Average packs/day: 0.3 packs/day for 2.8 years (0.7 ttl pk-yrs)     Types: Cigarettes     Start date: 02/2021     Passive exposure: Current    Smokeless tobacco: Never   Substance and Sexual Activity    Alcohol use: Never    Drug use: Never    Sexual activity: Yes     Review of Systems   Constitutional:  Negative for appetite change, chills, fatigue, fever and unexpected weight change.   HENT:  Negative for congestion, mouth sores, nosebleeds, sinus pain, sore throat and trouble swallowing.    Respiratory:  Negative for apnea, cough, chest tightness and shortness of breath.    Cardiovascular:  Negative for chest pain, palpitations and leg swelling.   Gastrointestinal:  Negative for abdominal pain, blood in stool, constipation, diarrhea, nausea and vomiting.   Genitourinary:  Negative for decreased urine volume, difficulty urinating, dysuria and frequency.   Musculoskeletal:  Positive for arthralgias and myalgias. Negative for back pain and neck pain.   Skin:  Negative for rash.   Neurological:  Negative for syncope, light-headedness and headaches.   Hematological:  Does not bruise/bleed easily.   Psychiatric/Behavioral:  Negative for confusion and suicidal ideas.      Objective:     Vital Signs (Most Recent):  Temp: 98.3 °F (36.8  °C) (11/14/23 1800)  Pulse: 82 (11/14/23 1920)  Resp: 16 (11/14/23 1920)  BP: (!) 110/54 (11/14/23 1800)  SpO2: 99 % (11/14/23 1920) Vital Signs (24h Range):  Temp:  [97.6 °F (36.4 °C)-98.3 °F (36.8 °C)] 98.3 °F (36.8 °C)  Pulse:  [76-86] 82  Resp:  [10-19] 16  SpO2:  [96 %-100 %] 99 %  BP: ()/(48-75) 110/54     Weight: 61.2 kg (135 lb)  Body mass index is 23.17 kg/m².     Physical Exam  Vitals and nursing note reviewed. Exam conducted with a chaperone present.   Constitutional:       Appearance: Normal appearance.   HENT:      Head: Atraumatic.      Mouth/Throat:      Mouth: Mucous membranes are moist.      Pharynx: Oropharynx is clear.   Eyes:      Conjunctiva/sclera: Conjunctivae normal.      Pupils: Pupils are equal, round, and reactive to light.   Cardiovascular:      Rate and Rhythm: Normal rate and regular rhythm.      Pulses: Normal pulses.      Heart sounds: Normal heart sounds.   Pulmonary:      Effort: Pulmonary effort is normal.      Breath sounds: Normal breath sounds.   Abdominal:      General: Abdomen is flat. Bowel sounds are normal.      Palpations: Abdomen is soft.   Musculoskeletal:      Cervical back: Neck supple.      Right lower leg: No edema.      Left lower leg: No edema.   Skin:     General: Skin is warm and dry.      Coloration: Skin is not jaundiced or pale.      Findings: No bruising, lesion or rash.   Neurological:      General: No focal deficit present.      Mental Status: She is alert and oriented to person, place, and time.   Psychiatric:         Mood and Affect: Mood normal.              CRANIAL NERVES     CN III, IV, VI   Pupils are equal, round, and reactive to light.       Significant Labs: All pertinent labs within the past 24 hours have been reviewed.    Significant Imaging: I have reviewed all pertinent imaging results/findings within the past 24 hours.

## 2023-11-15 NOTE — DISCHARGE INSTRUCTIONS
*Keep dressing dry and intact, do not remove dressing, if dressing becomes wet or bloody notify home health staff.  Swingbed/Home Health will remove your drain (if you have one) on post op day #2 and change your dressing on post op day #3 and day #10, give them that special dressing we sent home with you.  *Continue incentive spirometry at least every 2 hours while awake.  *Continue white stockings remove 2 times a day for 1 hour and replace.   *Continue ice pack to hip  *Take laxative of choice to have a bowel movement at least by tomorrow and then every other day.  *Increase fluids by mouth.  *Staples will be removed by home health/swingbed staff in 2 weeks from surgery prior to follow up appointment  *Continue pillows between legs for abduction  *Notify swingbed/home health staff if any concerns.

## 2023-11-15 NOTE — ANESTHESIA POSTPROCEDURE EVALUATION
Anesthesia Post Evaluation    Patient: Brandee Contreras    Procedure(s) Performed: Procedure(s) (LRB):  ROBOTIC ARTHROPLASTY,HIP (Left)    Final Anesthesia Type: general      Patient location during evaluation: PACU  Patient participation: Yes- Able to Participate  Level of consciousness: awake and alert and oriented  Post-procedure vital signs: reviewed and stable  Pain management: adequate  Airway patency: patent  DESHAWN mitigation strategies: Multimodal analgesia and Use of major conduction anesthesia (spinal/epidural) or peripheral nerve block  PONV status at discharge: No PONV  Anesthetic complications: no      Cardiovascular status: hemodynamically stable  Respiratory status: unassisted and spontaneous ventilation  Hydration status: euvolemic  Follow-up not needed.          Vitals Value Taken Time   /62 11/15/23 0640   Temp 36.7 °C (98 °F) 11/15/23 0640   Pulse 83 11/15/23 0640   Resp 18 11/15/23 0958   SpO2 100 % 11/15/23 0640         Event Time   Out of Recovery 13:17:00         Pain/Rohit Score: Pain Rating Prior to Med Admin: 10 (11/15/2023  9:58 AM)  Rohit Score: 9 (11/14/2023  1:10 PM)

## 2023-11-15 NOTE — PLAN OF CARE
Problem: Occupational Therapy  Goal: Occupational Therapy Goal  Description:   STG: (in 1 week)  Pt will bathe with setup and CGA assistance with bath sponge  Pt will perform UE dressing with independence  Pt will perform LE dressing with CGA using reacher and sockaid and long handled shoe horn   Pt will state and adhere to THR precautions during self care and mobility  Pt will t/f bed/chair/bsc with SBA using RW  Perform standing task x 5 min with supervision assistance using RW  Tolerate 15 min of tx without fatigue.    LTG: (in 4 weeks)  Restore to max I with selfcare and mobility.     Outcome: Ongoing, Progressing

## 2023-11-15 NOTE — DISCHARGE SUMMARY
[facility] - Short Stay Unit  Orthopedics  Discharge Summary      Patient Name: Brandee Contreras  MRN: [id number]  Admission Date: 11/14/2023  Hospital Length of Stay: 0 days  Discharge Date and Time:  11/15/2023 9:06 AM  Attending Physician: Mony Wayne MD   Discharging Provider: Mony Wayne MD  Primary Care Provider: Sonia Morris NP    HPI:   72-year-old female with severe degenerative joint disease left hip needing total hip arthroplasty on the left she is failed non operative treatment including injections has pain when she stands his at risk for falls  Left lower extremity she moves her toes has sensation to touch has palpable pulses she has limited motion on abduction adduction internal and external rotation of the left hip she does have full flexion of the hip to 90 comes to full extension.  There is crepitus on motion pain in the groin on motion  X-rays show severe degenerative changes left hip  Impression severe degenerative joint disease left hip  Plan total hip arthroplasty on left with MAKOplasty    Procedure(s) (LRB):  ROBOTIC ARTHROPLASTY,HIP (Left)      Hospital Course:  No notes on file patient was admitted to the hospital on 11/14/2023 underwent a left total hip arthroplasty without complication.  On postop day 1 her drains removed.  She moves her toes dorsiflexion plantar flexion.  Has sensation to touch.  She has palpable pulses.  She is tolerating p.o. pain medicine.  Has no new complaints.  She will discharge home.  Home health PT.  Follow-up with Dr. Wayne in 3 weeks.  DC staples in 2 weeks.  No complications during her stay.  Eliquis for DVT prophylaxis.  Norco for pain.    Goals of Care Treatment Preferences:  Code Status: Full Code      Consults (From admission, onward)          Status Ordering Provider     Inpatient consult to Hospitalist  Once        Provider:  (Not yet assigned)    Acknowledged MONY WAYNE     IP consult case management/social work  Once         Provider:  (Not yet assigned)    MONY Gonsalez            Significant Diagnostic Studies: Labs: All labs within the past 24 hours have been reviewed    Pending Diagnostic Studies:       Procedure Component Value Units Date/Time    Surgical Pathology [[id number]] Collected: 11/14/23 0828    Order Status: Sent Lab Status: In process Updated: 11/14/23 1121    Specimen: Bone from Femoral Head, Left           Final Active Diagnoses:    Diagnosis Date Noted POA    PRINCIPAL PROBLEM:  Arthritis of both hips [M16.0] 05/19/2021 Yes    Hypokalemia due to excessive renal loss of potassium [E87.6] 11/14/2023 Yes    Hypertension [I10] 06/13/2022 Yes      Problems Resolved During this Admission:      Discharged Condition: good    Disposition: Home-Health Care [facility] [place]    Follow Up:    Patient Instructions:   No discharge procedures on file.  Medications:  Reconciled Home Medications:      Medication List        ASK your doctor about these medications      amLODIPine 5 MG tablet  Commonly known as: NORVASC  Take 1 tablet (5 mg total) by mouth once daily.     celecoxib 200 MG capsule  Commonly known as: CeleBREX  TAKE ONE (1) CAPSULE BY MOUTH EVERY DAY  WITH FOOD     chlorthalidone 25 MG Tab  Commonly known as: HYGROTEN  Take 1 tablet (25 mg total) by mouth once daily.     ibandronate 150 mg tablet  Commonly known as: BONIVA  Take 1 tablet (150 mg total) by mouth every 30 days.     sulfamethoxazole-trimethoprim 800-160mg 800-160 mg Tab  Commonly known as: BACTRIM DS  Take 1 tablet by mouth 2 (two) times daily.              Mony Young MD  Orthopedics  [facility] - Short Stay Unit

## 2023-11-15 NOTE — CONSULTS
[facility] - Short Stay [facility] Medicine  Consult Note    Patient Name: Brandee Contreras  MRN: [id number]  Admission Date: 11/14/2023  Hospital Length of Stay: 0 days  Attending Physician: He Young MD   Primary Care Provider: Sonia Morris NP           Patient information was obtained from patient, past medical records, and ER records.     Consults  Subjective:     Principal Problem: Arthritis of both hips    Chief Complaint: No chief complaint on file.       HPI: 71 yo F underwent LTHR today.  No complaints of pain LLE but has muscle cramp from right thigh to calf.  Could be related to her hypokalemia which is most likely from her chlorthalidone.  Will replace and hold diuretic and continue CCB and have patient f/u with her PCP.      ROS as below.  Mild fullness in throat which she states is from intubation but no dysphagia or dyspepsia.          Past Medical History:   Diagnosis Date    Arthritis     Hypertension        Past Surgical History:   Procedure Laterality Date    JOINT REPLACEMENT Left 11/14/2023    LTHR     Dr. He Young    VAGINAL DELIVERY         Review of patient's allergies indicates:  No Known Allergies    No current facility-administered medications on file prior to encounter.     Current Outpatient Medications on File Prior to Encounter   Medication Sig    celecoxib (CELEBREX) 200 MG capsule TAKE ONE (1) CAPSULE BY MOUTH EVERY DAY  WITH FOOD     Family History       Problem Relation (Age of Onset)    Hypertension Maternal Grandmother          Tobacco Use    Smoking status: Former     Current packs/day: 0.25     Average packs/day: 0.3 packs/day for 2.8 years (0.7 ttl pk-yrs)     Types: Cigarettes     Start date: 02/2021     Passive exposure: Current    Smokeless tobacco: Never   Substance and Sexual Activity    Alcohol use: Never    Drug use: Never    Sexual activity: Yes     Review of Systems   Constitutional:  Negative for appetite change, chills, fatigue, fever  and unexpected weight change.   HENT:  Negative for congestion, mouth sores, nosebleeds, sinus pain, sore throat and trouble swallowing.    Respiratory:  Negative for apnea, cough, chest tightness and shortness of breath.    Cardiovascular:  Negative for chest pain, palpitations and leg swelling.   Gastrointestinal:  Negative for abdominal pain, blood in stool, constipation, diarrhea, nausea and vomiting.   Genitourinary:  Negative for decreased urine volume, difficulty urinating, dysuria and frequency.   Musculoskeletal:  Positive for arthralgias and myalgias. Negative for back pain and neck pain.   Skin:  Negative for rash.   Neurological:  Negative for syncope, light-headedness and headaches.   Hematological:  Does not bruise/bleed easily.   Psychiatric/Behavioral:  Negative for confusion and suicidal ideas.      Objective:     Vital Signs (Most Recent):  Temp: 98.3 °F (36.8 °C) (11/14/23 1800)  Pulse: 82 (11/14/23 1920)  Resp: 16 (11/14/23 1920)  BP: (!) 110/54 (11/14/23 1800)  SpO2: 99 % (11/14/23 1920) Vital Signs (24h Range):  Temp:  [97.6 °F (36.4 °C)-98.3 °F (36.8 °C)] 98.3 °F (36.8 °C)  Pulse:  [76-86] 82  Resp:  [10-19] 16  SpO2:  [96 %-100 %] 99 %  BP: ()/(48-75) 110/54     Weight: 61.2 kg (135 lb)  Body mass index is 23.17 kg/m².     Physical Exam  Vitals and nursing note reviewed. Exam conducted with a chaperone present.   Constitutional:       Appearance: Normal appearance.   HENT:      Head: Atraumatic.      Mouth/Throat:      Mouth: Mucous membranes are moist.      Pharynx: Oropharynx is clear.   Eyes:      Conjunctiva/sclera: Conjunctivae normal.      Pupils: Pupils are equal, round, and reactive to light.   Cardiovascular:      Rate and Rhythm: Normal rate and regular rhythm.      Pulses: Normal pulses.      Heart sounds: Normal heart sounds.   Pulmonary:      Effort: Pulmonary effort is normal.      Breath sounds: Normal breath sounds.   Abdominal:      General: Abdomen is flat. Bowel  sounds are normal.      Palpations: Abdomen is soft.   Musculoskeletal:      Cervical back: Neck supple.      Right lower leg: No edema.      Left lower leg: No edema.   Skin:     General: Skin is warm and dry.      Coloration: Skin is not jaundiced or pale.      Findings: No bruising, lesion or rash.   Neurological:      General: No focal deficit present.      Mental Status: She is alert and oriented to person, place, and time.   Psychiatric:         Mood and Affect: Mood normal.              CRANIAL NERVES     CN III, IV, VI   Pupils are equal, round, and reactive to light.       Significant Labs: All pertinent labs within the past 24 hours have been reviewed.    Significant Imaging: I have reviewed all pertinent imaging results/findings within the past 24 hours.  Assessment/Plan:     * Arthritis of both hips  S/p LTHR     Agree with pain control. DVT prophylaxis and rehab.        Hypokalemia due to excessive renal loss of potassium  Patient has hypokalemia which is Chronic and currently uncontrolled. Most recent potassium levels reviewed-   Lab Results   Component Value Date    K 2.8 (L) 11/14/2023   . Will continue potassium replacement per protocol and recheck repeat levels after replacement completed.     Will stop chlorthalidone and supplement K and recheck with mag with am labs.      Hypertension  Chronic, controlled. Latest blood pressure and vitals reviewed-     Temp:  [97.6 °F (36.4 °C)-98.3 °F (36.8 °C)]   Pulse:  [76-86]   Resp:  [10-19]   BP: ()/(48-75)   SpO2:  [96 %-100 %] .   Home meds for hypertension were reviewed and noted below.   Hypertension Medications               amLODIPine (NORVASC) 5 MG tablet Take 1 tablet (5 mg total) by mouth once daily.    chlorthalidone (HYGROTEN) 25 MG Tab Take 1 tablet (25 mg total) by mouth once daily.            While in the hospital, will manage blood pressure as follows; Adjust home antihypertensive regimen as follows- stop chlorothaladone.      Will  utilize p.r.n. blood pressure medication only if patient's blood pressure greater than 140/90 and she develops symptoms such as worsening chest pain or shortness of breath.      VTE Risk Mitigation (From admission, onward)           Ordered     apixaban tablet 2.5 mg  2 times daily         11/14/23 1336     IP VTE HIGH RISK PATIENT  Once         11/14/23 1336     Place DINORA hose  Until discontinued         11/14/23 1336     Place sequential compression device  Until discontinued         11/14/23 1336                        Thank you for your consult. I will follow-up with patient. Please contact us if you have any additional questions.    Yu Altamirano MD  Department of Hospital Medicine   [facility] Short Stay [facility]

## 2023-11-15 NOTE — ASSESSMENT & PLAN NOTE
Patient has hypokalemia which is Chronic and currently uncontrolled. Most recent potassium levels reviewed-   Lab Results   Component Value Date    K 2.8 (L) 11/14/2023   . Will continue potassium replacement per protocol and recheck repeat levels after replacement completed.     Will stop chlorthalidone and supplement K and recheck with mag with am labs.

## 2023-11-15 NOTE — PT/OT/SLP PROGRESS
Physical Therapy Treatment    Patient Name:  Brandee Contreras   MRN:  [id number]    Recommendations:     Discharge Recommendations: Low Intensity Therapy  Discharge Equipment Recommendations: walker, rolling  Barriers to discharge: None    Assessment:     Brandee Contreras is a 72 y.o. female admitted with a medical diagnosis of Arthritis of both hips.  She presents with the following impairments/functional limitations: weakness, impaired functional mobility, gait instability, decreased lower extremity function, pain, decreased ROM, orthopedic precautions Pt has normal post op pain. Has some expected weakness in LLE. Demonstrates good ambulation with rolling walker. Will have assistance at home.    Rehab Prognosis: Good; patient would benefit from acute skilled PT services to address these deficits and reach maximum level of function.    Recent Surgery: Procedure(s) (LRB):  ROBOTIC ARTHROPLASTY,HIP (Left) 1 Day Post-Op    Plan:     During this hospitalization, patient to be seen BID (5x/wk, daily 2x/wk) to address the identified rehab impairments via gait training, therapeutic activities, therapeutic exercises and progress toward the following goals:    Plan of Care Expires:  12/14/23    Subjective     Chief Complaint: left total hip replacement   Patient/Family Comments/goals: Pt is agreeable to PT   Pain/Comfort:  Pain Rating 1: 5/10  Location - Side 1: Left  Location 1: hip  Pain Addressed 1: Pre-medicate for activity  Pain Rating Post-Intervention 1: 5/10      Objective:     Communicated with LOW Hickey RN prior to session.  Patient found up in chair with hip abduction pillow upon PT entry to room.     General Precautions: Standard, fall  Orthopedic Precautions: LLE anterior precautions, LLE abduction precautions, LLE partial weight bearing  Braces: N/A  Respiratory Status: Room air     Functional Mobility:  Bed Mobility:     Scooting: contact guard assistance  Transfers:     Sit to Stand:  contact guard  assistance with rolling walker  Gait: 50 ft contact guard assistance with rolling walker, step to pattern, slow sushma, Maintained PWB  Balance: good      AM-PAC 6 CLICK MOBILITY  Turning over in bed (including adjusting bedclothes, sheets and blankets)?: 3  Sitting down on and standing up from a chair with arms (e.g., wheelchair, bedside commode, etc.): 4  Moving from lying on back to sitting on the side of the bed?: 3  Moving to and from a bed to a chair (including a wheelchair)?: 3  Need to walk in hospital room?: 4  Climbing 3-5 steps with a railing?: 3  Basic Mobility Total Score: 20       Treatment & Education:  Pt performed left LE: bed level exercises: quad sets and heel slides and seated exercises: ankle pumps and long arc quads x 30 each  Educated on weight bearing and hip precautions  Ambulated with rolling walker     Patient left up in chair with all lines intact and call button in reach..    GOALS:   Multidisciplinary Problems       Physical Therapy Goals          Problem: Physical Therapy    Goal Priority Disciplines Outcome Goal Variances Interventions   Physical Therapy Goal     PT, PT/OT Ongoing, Progressing     Description: Short term goals:  Pt will perform supine to sit with contact guard assistance  Pt will perform sit to stand with contact guard assistance  Pt will independently verbalize hip precautions  Pt will ambulate 50 ft with contact guard assistanceusing rolling walker    Long term goals:  Pt will perform supine to sit with modified independence  Pt will perform sit to stand with modified independence  Pt will ambulate 150 ft with modified independenceusing rolling walker                         Time Tracking:     PT Received On: 11/15/23  PT Start Time: 1024     PT Stop Time: 1054  PT Total Time (min): 30 min     Billable Minutes: Gait Training 15 and Therapeutic Exercise 15    Treatment Type: Treatment  PT/PTA: PT     Number of PTA visits since last PT visit: 0     11/15/2023

## 2023-11-16 PROCEDURE — G0180 PR HOME HEALTH MD CERTIFICATION: ICD-10-PCS | Mod: ,,, | Performed by: ORTHOPAEDIC SURGERY

## 2023-11-16 PROCEDURE — G0180 MD CERTIFICATION HHA PATIENT: HCPCS | Mod: ,,, | Performed by: ORTHOPAEDIC SURGERY

## 2023-11-16 NOTE — HOSPITAL COURSE
11/15 Records reviewed. Doing well and plans dc home. She feels treating k+ helped her energy. BP Ok. Takes Norvasc and chlorthalidone for BP outpt. Told her to have PCP monitor electrolytes and likely need potassium supplementation.

## 2023-11-16 NOTE — ASSESSMENT & PLAN NOTE
Chronic, controlled. Latest blood pressure and vitals reviewed-     Temp:  [97.4 °F (36.3 °C)-98.1 °F (36.7 °C)]   Pulse:  [83-92]   Resp:  [18]   BP: (122-146)/(56-68)   SpO2:  [96 %-100 %] .   Home meds for hypertension were reviewed and noted below.   Hypertension Medications               amLODIPine (NORVASC) 5 MG tablet Take 1 tablet (5 mg total) by mouth once daily.    chlorthalidone (HYGROTEN) 25 MG Tab Take 1 tablet (25 mg total) by mouth once daily.            While in the hospital, will manage blood pressure as follows; Adjust home antihypertensive regimen as follows- stop chlorothaladone.      Will utilize p.r.n. blood pressure medication only if patient's blood pressure greater than 140/90 and she develops symptoms such as worsening chest pain or shortness of breath.

## 2023-11-16 NOTE — SUBJECTIVE & OBJECTIVE
Interval History:     Review of Systems   Constitutional:  Negative for appetite change, fatigue and fever.   HENT:  Negative for congestion, hearing loss and trouble swallowing.    Respiratory:  Negative for chest tightness, shortness of breath and wheezing.    Cardiovascular:  Negative for chest pain and palpitations.   Gastrointestinal:  Negative for abdominal pain, constipation and nausea.   Genitourinary:  Negative for difficulty urinating and dysuria.   Musculoskeletal:  Positive for arthralgias. Negative for back pain and neck stiffness.   Skin:  Negative for pallor and rash.   Neurological:  Negative for dizziness, speech difficulty and headaches.   Psychiatric/Behavioral:  Negative for confusion and suicidal ideas.      Objective:     Vital Signs (Most Recent):  Temp: 98.1 °F (36.7 °C) (11/15/23 1017)  Pulse: 92 (11/15/23 1017)  Resp: 18 (11/15/23 1017)  BP: (!) 122/56 (11/15/23 1017)  SpO2: 100 % (11/15/23 1017) Vital Signs (24h Range):  Temp:  [97.4 °F (36.3 °C)-98.1 °F (36.7 °C)] 98.1 °F (36.7 °C)  Pulse:  [83-92] 92  Resp:  [18] 18  SpO2:  [96 %-100 %] 100 %  BP: (122-146)/(56-68) 122/56     Weight: 61.2 kg (135 lb)  Body mass index is 23.17 kg/m².    Intake/Output Summary (Last 24 hours) at 11/15/2023 2318  Last data filed at 11/15/2023 0701  Gross per 24 hour   Intake 938.75 ml   Output 1850 ml   Net -911.25 ml         Physical Exam  Vitals reviewed.   Constitutional:       General: She is awake. She is not in acute distress.     Appearance: She is well-developed. She is not toxic-appearing.   HENT:      Head: Normocephalic.      Nose: Nose normal.      Mouth/Throat:      Pharynx: Oropharynx is clear.   Eyes:      Extraocular Movements: Extraocular movements intact.      Pupils: Pupils are equal, round, and reactive to light.   Neck:      Thyroid: No thyroid mass.      Vascular: No carotid bruit.   Cardiovascular:      Rate and Rhythm: Normal rate and regular rhythm.      Pulses: Normal pulses.       Heart sounds: Normal heart sounds. No murmur heard.  Pulmonary:      Effort: Pulmonary effort is normal.      Breath sounds: Normal breath sounds and air entry. No wheezing.   Abdominal:      General: Bowel sounds are normal. There is no distension.      Palpations: Abdomen is soft.      Tenderness: There is no abdominal tenderness.   Musculoskeletal:      Cervical back: Neck supple. No rigidity.      Comments: S/p hip surgery   Skin:     General: Skin is warm.      Coloration: Skin is not jaundiced.      Findings: No lesion.   Neurological:      General: No focal deficit present.      Mental Status: She is alert and oriented to person, place, and time.      Cranial Nerves: No cranial nerve deficit.   Psychiatric:         Attention and Perception: Attention normal.         Mood and Affect: Mood normal.         Behavior: Behavior normal. Behavior is cooperative.         Thought Content: Thought content normal.         Cognition and Memory: Cognition normal.             Significant Labs: All pertinent labs within the past 24 hours have been reviewed.  BMP:   Recent Labs   Lab 11/15/23  0330   GLU 99      K 3.9      CO2 30   BUN 13   CREATININE 0.59   CALCIUM 8.0*   MG 2.1     CBC:   Recent Labs   Lab 11/14/23  1220 11/15/23  0330   WBC 7.37 9.39   HGB 10.5* 10.5*   HCT 31.2* 31.5*    239     CMP:   Recent Labs   Lab 11/14/23  1220 11/15/23  0330    140   K 2.8* 3.9   * 106   CO2 27 30   * 99   BUN 11 13   CREATININE 0.56 0.59   CALCIUM 7.8* 8.0*   PROT  --  5.6*   ALBUMIN  --  2.7*   BILITOT  --  0.3   ALKPHOS  --  54*   AST  --  33   ALT  --  27   ANIONGAP 10 8       Significant Imaging: I have reviewed all pertinent imaging results/findings within the past 24 hours.      Intake/Output - Last 3 Shifts         11/14 0700  11/15 0659 11/15 0700  11/16 0659    P.O. 320     I.V. (mL/kg) 2897.5 (47.3)     IV Piggyback 950     Total Intake(mL/kg) 4167.5 (68.1)     Urine (mL/kg/hr) 2450  (1.7) 700 (0.7)    Drains 70     Blood 150     Total Output 2670 700    Net +1497.5 -700                Microbiology Results (last 7 days)       ** No results found for the last 168 hours. **

## 2023-11-16 NOTE — PROGRESS NOTES
[facility] Short Stay [facility] Medicine  Progress Note    Patient Name: Brandee Contreras  MRN: [id number]  Patient Class: OP- Outpatient Recovery   Admission Date: 11/14/2023  Length of Stay: 0 days  Attending Physician: No att. providers found  Primary Care Provider: Sonia Morris NP        Subjective:     Principal Problem:Arthritis of both hips        HPI:  73 yo F underwent LTHR today.  No complaints of pain LLE but has muscle cramp from right thigh to calf.  Could be related to her hypokalemia which is most likely from her chlorthalidone.  Will replace and hold diuretic and continue CCB and have patient f/u with her PCP.      ROS as below.  Mild fullness in throat which she states is from intubation but no dysphagia or dyspepsia.          Overview/Hospital Course:  11/15 Records reviewed. Doing well and plans dc home. She feels treating k+ helped her energy. BP Ok. Takes Norvasc and chlorthalidone for BP outpt. Told her to have PCP monitor electrolytes and likely need potassium supplementation.     Interval History:     Review of Systems   Constitutional:  Negative for appetite change, fatigue and fever.   HENT:  Negative for congestion, hearing loss and trouble swallowing.    Respiratory:  Negative for chest tightness, shortness of breath and wheezing.    Cardiovascular:  Negative for chest pain and palpitations.   Gastrointestinal:  Negative for abdominal pain, constipation and nausea.   Genitourinary:  Negative for difficulty urinating and dysuria.   Musculoskeletal:  Positive for arthralgias. Negative for back pain and neck stiffness.   Skin:  Negative for pallor and rash.   Neurological:  Negative for dizziness, speech difficulty and headaches.   Psychiatric/Behavioral:  Negative for confusion and suicidal ideas.      Objective:     Vital Signs (Most Recent):  Temp: 98.1 °F (36.7 °C) (11/15/23 1017)  Pulse: 92 (11/15/23 1017)  Resp: 18 (11/15/23 1017)  BP: (!) 122/56 (11/15/23  1017)  SpO2: 100 % (11/15/23 1017) Vital Signs (24h Range):  Temp:  [97.4 °F (36.3 °C)-98.1 °F (36.7 °C)] 98.1 °F (36.7 °C)  Pulse:  [83-92] 92  Resp:  [18] 18  SpO2:  [96 %-100 %] 100 %  BP: (122-146)/(56-68) 122/56     Weight: 61.2 kg (135 lb)  Body mass index is 23.17 kg/m².    Intake/Output Summary (Last 24 hours) at 11/15/2023 2318  Last data filed at 11/15/2023 0701  Gross per 24 hour   Intake 938.75 ml   Output 1850 ml   Net -911.25 ml         Physical Exam  Vitals reviewed.   Constitutional:       General: She is awake. She is not in acute distress.     Appearance: She is well-developed. She is not toxic-appearing.   HENT:      Head: Normocephalic.      Nose: Nose normal.      Mouth/Throat:      Pharynx: Oropharynx is clear.   Eyes:      Extraocular Movements: Extraocular movements intact.      Pupils: Pupils are equal, round, and reactive to light.   Neck:      Thyroid: No thyroid mass.      Vascular: No carotid bruit.   Cardiovascular:      Rate and Rhythm: Normal rate and regular rhythm.      Pulses: Normal pulses.      Heart sounds: Normal heart sounds. No murmur heard.  Pulmonary:      Effort: Pulmonary effort is normal.      Breath sounds: Normal breath sounds and air entry. No wheezing.   Abdominal:      General: Bowel sounds are normal. There is no distension.      Palpations: Abdomen is soft.      Tenderness: There is no abdominal tenderness.   Musculoskeletal:      Cervical back: Neck supple. No rigidity.      Comments: S/p hip surgery   Skin:     General: Skin is warm.      Coloration: Skin is not jaundiced.      Findings: No lesion.   Neurological:      General: No focal deficit present.      Mental Status: She is alert and oriented to person, place, and time.      Cranial Nerves: No cranial nerve deficit.   Psychiatric:         Attention and Perception: Attention normal.         Mood and Affect: Mood normal.         Behavior: Behavior normal. Behavior is cooperative.         Thought Content:  Thought content normal.         Cognition and Memory: Cognition normal.             Significant Labs: All pertinent labs within the past 24 hours have been reviewed.  BMP:   Recent Labs   Lab 11/15/23  0330   GLU 99      K 3.9      CO2 30   BUN 13   CREATININE 0.59   CALCIUM 8.0*   MG 2.1     CBC:   Recent Labs   Lab 11/14/23  1220 11/15/23  0330   WBC 7.37 9.39   HGB 10.5* 10.5*   HCT 31.2* 31.5*    239     CMP:   Recent Labs   Lab 11/14/23  1220 11/15/23  0330    140   K 2.8* 3.9   * 106   CO2 27 30   * 99   BUN 11 13   CREATININE 0.56 0.59   CALCIUM 7.8* 8.0*   PROT  --  5.6*   ALBUMIN  --  2.7*   BILITOT  --  0.3   ALKPHOS  --  54*   AST  --  33   ALT  --  27   ANIONGAP 10 8       Significant Imaging: I have reviewed all pertinent imaging results/findings within the past 24 hours.      Intake/Output - Last 3 Shifts         11/14 0700  11/15 0659 11/15 0700  11/16 0659    P.O. 320     I.V. (mL/kg) 2897.5 (47.3)     IV Piggyback 950     Total Intake(mL/kg) 4167.5 (68.1)     Urine (mL/kg/hr) 2450 (1.7) 700 (0.7)    Drains 70     Blood 150     Total Output 2670 700    Net +1497.5 -700                Microbiology Results (last 7 days)       ** No results found for the last 168 hours. **              Assessment/Plan:      * Arthritis of both hips  S/p LTHR     Agree with pain control. DVT prophylaxis and rehab.        Hypokalemia due to excessive renal loss of potassium  Patient has hypokalemia which is Chronic and currently uncontrolled. Most recent potassium levels reviewed-   Lab Results   Component Value Date    K 3.9 11/15/2023   . Will continue potassium replacement per protocol and recheck repeat levels after replacement completed.     Will stop chlorthalidone and supplement K and recheck with mag with am labs.      11/15 told have PCP follow    Hypertension  Chronic, controlled. Latest blood pressure and vitals reviewed-     Temp:  [97.4 °F (36.3 °C)-98.1 °F (36.7 °C)]    Pulse:  [83-92]   Resp:  [18]   BP: (122-146)/(56-68)   SpO2:  [96 %-100 %] .   Home meds for hypertension were reviewed and noted below.   Hypertension Medications               amLODIPine (NORVASC) 5 MG tablet Take 1 tablet (5 mg total) by mouth once daily.    chlorthalidone (HYGROTEN) 25 MG Tab Take 1 tablet (25 mg total) by mouth once daily.            While in the hospital, will manage blood pressure as follows; Adjust home antihypertensive regimen as follows- stop chlorothaladone.      Will utilize p.r.n. blood pressure medication only if patient's blood pressure greater than 140/90 and she develops symptoms such as worsening chest pain or shortness of breath.      VTE Risk Mitigation (From admission, onward)           Ordered     IP VTE HIGH RISK PATIENT  Once         11/15/23 0909                    Discharge Planning   RICARDO: 11/15/2023     Code Status: Prior   Is the patient medically ready for discharge?:     Reason for patient still in hospital (select all that apply): Laboratory test, Treatment, and Imaging  Discharge Plan A: Home Health, Home with family   Discharge Delays: None known at this time              Deven Payton MD  Department of Hospital Medicine   [facility] - Short Stay Unit

## 2023-11-16 NOTE — ASSESSMENT & PLAN NOTE
Patient has hypokalemia which is Chronic and currently uncontrolled. Most recent potassium levels reviewed-   Lab Results   Component Value Date    K 3.9 11/15/2023   . Will continue potassium replacement per protocol and recheck repeat levels after replacement completed.     Will stop chlorthalidone and supplement K and recheck with mag with am labs.      11/15 told have PCP follow

## 2023-11-20 LAB
ESTROGEN SERPL-MCNC: NORMAL PG/ML
INSULIN SERPL-ACNC: NORMAL U[IU]/ML
LAB AP GROSS DESCRIPTION: NORMAL
LAB AP LABORATORY NOTES: NORMAL
T3RU NFR SERPL: NORMAL %

## 2023-11-28 ENCOUNTER — TELEPHONE (OUTPATIENT)
Dept: ORTHOPEDICS | Facility: CLINIC | Age: 73
End: 2023-11-28
Payer: COMMERCIAL

## 2023-11-28 NOTE — TELEPHONE ENCOUNTER
----- Message from Judy Yan sent at 11/28/2023  9:22 AM CST -----    Patient called asking for the pain med refill to [facility] to be checked on. SHe is in a lot pf pain.

## 2023-11-28 NOTE — TELEPHONE ENCOUNTER
Left message stating that Dr. Young was in surgery today but I would ask him about pain medicine as soon as he came to the clinic and would send it in to the Pharmacy at [facility].

## 2023-11-29 DIAGNOSIS — Z47.89 ENCOUNTER FOR ORTHOPEDIC FOLLOW-UP CARE: Primary | ICD-10-CM

## 2023-11-29 RX ORDER — HYDROCODONE BITARTRATE AND ACETAMINOPHEN 10; 325 MG/1; MG/1
1 TABLET ORAL EVERY 6 HOURS PRN
Qty: 28 TABLET | Refills: 0 | Status: SHIPPED | OUTPATIENT
Start: 2023-11-29

## 2023-12-03 ENCOUNTER — EXTERNAL HOME HEALTH (OUTPATIENT)
Dept: HOME HEALTH SERVICES | Facility: HOSPITAL | Age: 73
End: 2023-12-03
Payer: COMMERCIAL

## 2023-12-04 DIAGNOSIS — Z96.642 STATUS POST HIP REPLACEMENT, LEFT: Primary | ICD-10-CM

## 2023-12-06 ENCOUNTER — HOSPITAL ENCOUNTER (OUTPATIENT)
Dept: RADIOLOGY | Facility: HOSPITAL | Age: 73
Discharge: HOME | End: 2023-12-06
Attending: ORTHOPAEDIC SURGERY
Payer: COMMERCIAL

## 2023-12-06 ENCOUNTER — OFFICE VISIT (OUTPATIENT)
Dept: ORTHOPEDICS | Facility: CLINIC | Age: 73
End: 2023-12-06
Payer: COMMERCIAL

## 2023-12-06 VITALS — HEIGHT: 64 IN | BODY MASS INDEX: 23.05 KG/M2 | WEIGHT: 135 LBS

## 2023-12-06 DIAGNOSIS — Z96.642 STATUS POST TOTAL HIP REPLACEMENT, LEFT: ICD-10-CM

## 2023-12-06 DIAGNOSIS — Z96.642 STATUS POST HIP REPLACEMENT, LEFT: Primary | ICD-10-CM

## 2023-12-06 DIAGNOSIS — Z47.89 ENCOUNTER FOR ORTHOPEDIC FOLLOW-UP CARE: ICD-10-CM

## 2023-12-06 DIAGNOSIS — Z96.642 STATUS POST HIP REPLACEMENT, LEFT: ICD-10-CM

## 2023-12-06 PROCEDURE — 73502 X-RAY EXAM HIP UNI 2-3 VIEWS: CPT | Mod: TC,LT

## 2023-12-06 PROCEDURE — 1159F MED LIST DOCD IN RCRD: CPT | Mod: CPTII,,, | Performed by: ORTHOPAEDIC SURGERY

## 2023-12-06 PROCEDURE — 99024 PR POST-OP FOLLOW-UP VISIT: ICD-10-PCS | Mod: ,,, | Performed by: ORTHOPAEDIC SURGERY

## 2023-12-06 PROCEDURE — 73502 X-RAY EXAM HIP UNI 2-3 VIEWS: CPT | Mod: 26,LT,, | Performed by: ORTHOPAEDIC SURGERY

## 2023-12-06 PROCEDURE — 1159F PR MEDICATION LIST DOCUMENTED IN MEDICAL RECORD: ICD-10-PCS | Mod: CPTII,,, | Performed by: ORTHOPAEDIC SURGERY

## 2023-12-06 PROCEDURE — 99213 OFFICE O/P EST LOW 20 MIN: CPT | Mod: PBBFAC | Performed by: ORTHOPAEDIC SURGERY

## 2023-12-06 PROCEDURE — 99024 POSTOP FOLLOW-UP VISIT: CPT | Mod: ,,, | Performed by: ORTHOPAEDIC SURGERY

## 2023-12-06 PROCEDURE — 73502 XR HIP WITH PELVIS WHEN PERFORMED, 2 OR 3 VIEWS LEFT: ICD-10-PCS | Mod: 26,LT,, | Performed by: ORTHOPAEDIC SURGERY

## 2023-12-06 RX ORDER — HYDROCODONE BITARTRATE AND ACETAMINOPHEN 10; 325 MG/1; MG/1
1 TABLET ORAL EVERY 6 HOURS PRN
Qty: 28 TABLET | Refills: 0 | Status: SHIPPED | OUTPATIENT
Start: 2023-12-06

## 2023-12-06 NOTE — PROGRESS NOTES
Radiology Interpretation        Patient Name: Brandee Contreras  Date: 12/6/2023  YOB: 1950  MRN# [id number]        ORDERING DIAGNOSIS:    Encounter Diagnosis   Name Primary?    Status post hip replacement, left Yes        AP pelvis skeletally mature individual there is total hip arthroplasty in place on left there are degenerative changes right hip there are no fractures no loosening components no shift alignment impression total hip arthroplasty in place on left degenerative changes right hip               He Young MD

## 2023-12-06 NOTE — PROGRESS NOTES
Patient is here for follow-up of her left total hip arthroplasty.  Wounds show no signs of infection.  Her staples been removed.  She has good motion of the hip.  Let her progress to start to work toward weight-bearing as tolerated on left lower extremity.  She is walking with a walker.  Neurovascularly intact distally.  I will follow back up in 6 weeks.

## 2023-12-06 NOTE — PROGRESS NOTES
Radiology Interpretation        Patient Name: Brandee Contreras  Date: 12/6/2023  YOB: 1950  MRN# [id number]        ORDERING DIAGNOSIS:    Encounter Diagnosis   Name Primary?    Status post hip replacement, left Yes      AP lateral left hip skeletally mature individual total hip arthroplasty in place no loosening fractures or subluxations impression total hip arthroplasty in place left hip no loosening                 He Young MD

## 2023-12-26 ENCOUNTER — DOCUMENT SCAN (OUTPATIENT)
Dept: HOME HEALTH SERVICES | Facility: HOSPITAL | Age: 73
End: 2023-12-26
Payer: COMMERCIAL

## 2023-12-28 ENCOUNTER — TELEPHONE (OUTPATIENT)
Dept: ORTHOPEDICS | Facility: CLINIC | Age: 73
End: 2023-12-28
Payer: COMMERCIAL

## 2023-12-28 DIAGNOSIS — Z96.642 STATUS POST HIP REPLACEMENT, LEFT: Primary | ICD-10-CM

## 2023-12-28 NOTE — TELEPHONE ENCOUNTER
----- Message from Kamala Guillen sent at 12/28/2023 12:24 PM CST -----  Pt is through with therapy from home health. She was told she now needs a RX for outpatient therapy. [phone].

## 2023-12-28 NOTE — TELEPHONE ENCOUNTER
Called patient and she stated she wanted a physical therapy prescription sent to [facility].  Placed order in Epic and told patient to call and make her appts.

## 2024-01-09 ENCOUNTER — CLINICAL SUPPORT (OUTPATIENT)
Dept: REHABILITATION | Facility: HOSPITAL | Age: 74
End: 2024-01-09
Attending: ORTHOPAEDIC SURGERY
Payer: MEDICARE

## 2024-01-09 DIAGNOSIS — R26.2 DIFFICULTY WALKING: ICD-10-CM

## 2024-01-09 DIAGNOSIS — Z96.642 STATUS POST HIP REPLACEMENT, LEFT: ICD-10-CM

## 2024-01-09 DIAGNOSIS — M25.652 STIFFNESS OF LEFT HIP JOINT: Primary | ICD-10-CM

## 2024-01-09 DIAGNOSIS — R53.1 WEAKNESS: ICD-10-CM

## 2024-01-09 PROCEDURE — 97161 PT EVAL LOW COMPLEX 20 MIN: CPT

## 2024-01-09 PROCEDURE — 97110 THERAPEUTIC EXERCISES: CPT

## 2024-01-09 NOTE — PLAN OF CARE
[facility]   Physical Therapy Initial Evaluation      Name: Brandee Contreras  Clinic Number: [id number]    Therapy Diagnosis: No diagnosis found.     Physician: He Young MD    Physician Orders: PT Eval and Treat  Medical Diagnosis from Referral: s/p L BRYAN  Evaluation Date: 1/9/2024  Authorization Period Expiration: 12/27/24  Plan of Care Expiration: 02/16/24  Progress Note Due: 02/09/24  Visit # / Visits authorized: 1/ Pending   FOTO: 58 at IE    Precautions: Standard     Time In: 09:25AM  Time Out: 10:01AM  Total Appointment Time (timed & untimed codes): 36 minutes    Subjective     Date of onset: 11/14/23    History of current condition - Brandee reports to physical therapy s/p L BRYAN performed on 11/14/23 by Dr. Young here at [facility].  Had been getting home health therapy for until end of December.      Now 8 weeks out from therapy at this point, and reports that the hip is feeling great but pt is just experiencing soreness and decreased strength through the L LE at this point.  Biggest limitation is being unable to pick something up off the floor when she drops it.  Able to get in and out of the car pretty well at this point.  Hasn't really had to go up and down stairs since surgery.  Sleeping well at this point.      Lives with her Mom and Uncle in single story home with a single step threshold.     Falls: None    Imaging: Recent radiographs showing good alignment of hardware    Prior Therapy: Recent home health following surgery  Social History: Lives with mom and Uncle  Occupation: Retired   Prior Level of Function: Chronic hip pain prior to having BRYAN  Current Level of Function: Decreased endurance and soreness    Pain:  Current 0/10, worst 4/10, best 0/10   Location: R LE more muscular than in the joint  Description: Aching, Dull, and Sharp  Aggravating Factors: Standing, Walking, and Getting out of bed/chair  Easing Factors: rest and  Tylenol    Patients goals: to be back to getting around to [employer]     Medical History:   Past Medical History:   Diagnosis Date    Arthritis     Hypertension     Nicotine dependence        Surgical History:   Brandee Contreras  has a past surgical history that includes Vaginal delivery; Joint replacement (Left, 11/14/2023); and robotic arthroplasty, hip (Left, 11/14/2023).    Medications:   Brandee has a current medication list which includes the following prescription(s): amlodipine, celecoxib, chlorthalidone, hydrocodone-acetaminophen, hydrocodone-acetaminophen, hydrocodone-acetaminophen, ibandronate, and sulfamethoxazole-trimethoprim 800-160mg.    Allergies:   Review of patient's allergies indicates:  No Known Allergies     Objective      Ectomorphic female w/ compensated trendelenburg gait bilaterally w/ SPC on R.  Slight swayback posture.        Left Lower Extremity  ROM/Strength Right lower Extremity     AROM STRENGTH  AROM STRENGTH   75 3-/5 HIP     Flexion (120) 70 3-/5   0 3-/5            Extension (15) -5 3-/5   20 3-/5            Abduction (45) 20 3-/5   0 3-/5            Adduction (25) 5 3-/5   0 3-/5            Internal Rotation (30) 0 3-/5   20 3-/5            External Rotation (50) 15 3-/5     Unable to single leg bridge on either leg      Clinical Special Tests:  A. Hip  Derek test: positive       Limitation/Restriction for FOTO Hip Survey    Therapist reviewed FOTO scores for Bradnee Contreras on 1/9/2024.   FOTO documents entered into [employer] - see Media section.    Intake Score: 58%         Treatment     Total Treatment time (time-based codes) separate from Evaluation: 12 minutes     Brandee received the treatments listed below:  THERAPEUTIC EXERCISES to develop strength, endurance, ROM, and flexibility for 12 minutes including bent knee fallouts [id number], Derek stretch [id number], hooklying clamshells [id number] w/ green tb, bridges 84l1iyb.       Patient Education and Home Exercises      Education provided:   - Diagnosis, prognosis, plan of care forward    Written Home Exercises Provided: yes. Exercises were reviewed and Brandee was able to demonstrate them prior to the end of the session.  Brandee demonstrated good  understanding of the education provided. See EMR under Patient Instructions for exercises provided during therapy sessions.    Assessment     Brandee is a 73 y.o. female referred to outpatient Physical Therapy s/p L BRYAN.  Patient presents with decreased L hip ROM, strength, flexibility, endurance, functional capacity secondary to recent surgery which we will work to address through skilled therapy at this time.  R hip not doing much better here from a ROM and strength standpoint.     Patient prognosis is Good.   Patient will benefit from skilled outpatient Physical Therapy to address the deficits stated above and in the chart below, provide patient /family education, and to maximize patientt's level of independence.     Plan of care discussed with patient: Yes  Patient's spiritual, cultural and educational needs considered and patient is agreeable to the plan of care and goals as stated below:     Anticipated Barriers for therapy: possible need for R BRYAN down the road, 2 months out and ROM still very limited, depression    Medical Necessity is demonstrated by the following  History  Co-morbidities and personal factors that may impact the plan of care [] LOW: no personal factors / co-morbidities  [x] MODERATE: 1-2 personal factors / co-morbidities  [] HIGH: 3+ personal factors / co-morbidities    Moderate / High Support Documentation:   Co-morbidities affecting plan of care: possible need for R BRYAN down the road, 2 months out and ROM still very limited, depression.    Personal Factors:   coping style     Examination  Body Structures and Functions, activity limitations and participation restrictions that may impact the plan of care [] LOW: addressing 1-2 elements  [x] MODERATE: 3+  "elements  [] HIGH: 4+ elements (please support below)    Moderate / High Support Documentation: decreased L hip ROM, strength, flexibility, endurance, functional capacit     Clinical Presentation [x] LOW: stable  [] MODERATE: Evolving  [] HIGH: Unstable     Decision Making/ Complexity Score: low       Goals:  Short Term Goals: 3 weeks   Pt will be independent with HEP for continued progression beyond skilled therapy.  Pt will be able to perform a sit to stand without increased L hip pain.  Pt will be able to amb I without pain or deviation from L hip.  Pt will be able to perform a bodyweight squat without increased L hip pain.      Long Term Goals: 5 weeks   Pt will be able to perform a single leg bridge for 20 seconds on each side without increased hip pain.  Pt will be able to perform a lateral step down from a 6" step without pain or limitation from L hip.  Pt will be able to negotiate stairs reciprocally without pain or limitation from L hip.  Pt will be able to return to all prior ADLs without pain or limitation from L hip.   Plan     Plan of care Certification: 1/9/2024 to 02/16/24.    Outpatient Physical Therapy 2 times weekly for 6 weeks to include the following interventions: Gait Training, Manual Therapy, Neuromuscular Re-ed, Patient Education, Therapeutic Activities, and Therapeutic Exercise.   "

## 2024-01-09 NOTE — PROGRESS NOTES
[facility]   Physical Therapy Initial Evaluation      Name: Brandee Contreras  Clinic Number: [id number]    Therapy Diagnosis: No diagnosis found.     Physician: He Young MD    Physician Orders: {AMB PT KNEE ORDERS:73032} ***  Medical Diagnosis from Referral: ***  Evaluation Date: 1/9/2024  Authorization Period Expiration: ***  Plan of Care Expiration: ***  Progress Note Due: ***  Visit # / Visits authorized: ***/ ***   FOTO: ***/***    Precautions: {IP WOUND PRECAUTIONS OHS:89511}     Time In: ***  Time Out: ***  Total Appointment Time (timed & untimed codes): *** minutes    Subjective     Date of onset: 11/14/23    History of current condition - Brandee reports to physical therapy s/p L BRYAN performed on 11/14/23 by Dr. Young here at [facility].  Had been getting home health therapy for until end of December.      Now 8 weeks out from therapy at this point, and reports that the hip is feeling great but pt is just experiencing soreness and decreased strength through the L LE at this point.  Biggest limitation is being unable to pick something up off the floor when she drops it.  Able to get in and out of the car pretty well at this point.  Hasn't really had to go up and down stairs since surgery.  Sleeping well at this point.      Lives with her Mom and Uncle in single story home with a single step threshold.     Falls: None    Imaging: Recent radiographs showing good alignment of hardware    Prior Therapy: Recent home health following surgery  Social History: Lives with mom and Uncle  Occupation: Retired   Prior Level of Function: Chronic hip pain prior to having BRYAN  Current Level of Function: Decreased endurance and soreness    Pain:  Current 0/10, worst 4/10, best 0/10   Location: R LE more muscular than in the joint  Description: Aching, Dull, and Sharp  Aggravating Factors: Standing, Walking, and Getting out of bed/chair  Easing Factors: rest and  "Tylenol    Patients goals: to be back to getting around to [employer] activities     Medical History:   Past Medical History:   Diagnosis Date    Arthritis     Hypertension     Nicotine dependence        Surgical History:   Brandee Contreras  has a past surgical history that includes Vaginal delivery; Joint replacement (Left, 11/14/2023); and robotic arthroplasty, hip (Left, 11/14/2023).    Medications:   Brandee has a current medication list which includes the following prescription(s): amlodipine, celecoxib, chlorthalidone, hydrocodone-acetaminophen, hydrocodone-acetaminophen, hydrocodone-acetaminophen, ibandronate, and sulfamethoxazole-trimethoprim 800-160mg.    Allergies:   Review of patient's allergies indicates:  No Known Allergies     Objective      ***    Limitation/Restriction for FOTO *** Survey    Therapist reviewed FOTO scores for Brandee Contreras on 1/9/2024.   FOTO documents entered into EPIC - see Media section.    Limitation Score: ***%         Treatment     Total Treatment time (time-based codes) separate from Evaluation: *** minutes       Brandee received the treatments listed below:  {OTW Treatment:04283}      Patient Education and Home Exercises     Education provided:   - ***    Written Home Exercises Provided: {Blank single:74419::"yes","Patient instructed to cont prior HEP"}. Exercises were reviewed and Brandee was able to demonstrate them prior to the end of the session.  Brandee demonstrated {Desc; good/fair/poor:24376} understanding of the education provided. See EMR under Patient Instructions for exercises provided during therapy sessions.    Assessment     Brandee is a 73 y.o. female referred to outpatient Physical Therapy with a medical diagnosis of ***. Patient presents with ***    Patient prognosis is {REHAB PROGNOSIS OHS:71900}.   Patient will benefit from skilled outpatient Physical Therapy to address the deficits stated above and in the chart below, provide patient /family education, and " "to maximize patientt's level of independence.     Plan of care discussed with patient: {YES:33863}  Patient's spiritual, cultural and educational needs considered and patient is agreeable to the plan of care and goals as stated below:     Anticipated Barriers for therapy: ***    Medical Necessity is demonstrated by the following  History  Co-morbidities and personal factors that may impact the plan of care [] LOW: no personal factors / co-morbidities  [] MODERATE: 1-2 personal factors / co-morbidities  [] HIGH: 3+ personal factors / co-morbidities    Moderate / High Support Documentation:   Co-morbidities affecting plan of care: ***    Personal Factors:   {Personal Factors:68164}     Examination  Body Structures and Functions, activity limitations and participation restrictions that may impact the plan of care [] LOW: addressing 1-2 elements  [] MODERATE: 3+ elements  [] HIGH: 4+ elements (please support below)    Moderate / High Support Documentation: ***     Clinical Presentation [] LOW: stable  [] MODERATE: Evolving  [] HIGH: Unstable     Decision Making/ Complexity Score: {Desc; low/moderate/high:239456}       Goals:  Short Term Goals: *** weeks   ***    Long Term Goals: *** weeks   ***  Plan     Plan of care Certification: 1/9/2024 to ***.    Outpatient Physical Therapy {NUMBERS 1-5:84701} times weekly for {0-10:43948::"0"} weeks to include the following interventions: {TX PLAN:59518}.     Julius Daniels, PT   "

## 2024-01-11 ENCOUNTER — CLINICAL SUPPORT (OUTPATIENT)
Dept: REHABILITATION | Facility: HOSPITAL | Age: 74
End: 2024-01-11
Payer: MEDICARE

## 2024-01-11 DIAGNOSIS — Z96.642 STATUS POST TOTAL HIP REPLACEMENT, LEFT: Primary | ICD-10-CM

## 2024-01-11 PROCEDURE — 97530 THERAPEUTIC ACTIVITIES: CPT | Mod: CQ

## 2024-01-11 PROCEDURE — 97110 THERAPEUTIC EXERCISES: CPT | Mod: CQ

## 2024-01-11 NOTE — PROGRESS NOTES
[facility]   Physical Therapy Treatment Note      Name: Brandee Contreras  Clinic Number: [id number]    Therapy Diagnosis:   Encounter Diagnosis   Name Primary?    Status post total hip replacement, left Yes     Physician: He Young MD    Visit Date: 1/11/2024    Physician Orders: PT Eval and Treat  Medical Diagnosis from Referral: s/p L BRYAN  Evaluation Date: 1/9/2024  Authorization Period Expiration: 12/27/24  Plan of Care Expiration: 02/16/24  Progress Note Due: 02/09/24  Visit # / Visits authorized: 2/ Pending   FOTO: 58 at IE     Precautions: Standard     PTA Visit #: 1/5     Time In: 9:15 am  Time Out: 9:55 am  Total Billable Time: 38 minutes    Subjective     Pt reports: her right hip is the one that gives her problems.  She was compliant with home exercise program.  Response to previous treatment: no complaint from treatment  Functional change: first visit after evaluation     Pain: 0/10 in left, right hurts  Location: left hip      Objective      Objective Measures updated at progress report unless specified.   Case conference with JOLANTA Rome, prior to initial PTA visit.    Treatment     Brandee received the treatments listed below:      therapeutic exercises to develop strength, endurance, ROM, and flexibility for 8 minutes including:  NuStep x 5 minutes   Bent knee fallouts 10 x 10 second hold     neuromuscular re-education activities to improve: Balance, Coordination, and Proprioception for 0 minutes. The following activities were included:  (not performed today)     therapeutic activities to improve functional performance for 30  minutes, including:  Hooklying hip abduction with green x 20 with 5 second hold   Hooklying hip flexion x 20 on left only  Bridges x 20 with 5 second hold   Long arc quads x 20 with 3 second hold   Heelslides x 20  Calf raises x 20  Standing hip abduction x 20 left only      gait training to improve functional mobility and safety for 0   minutes, including:  (not performed today)       Patient Education and Home Exercises       Education provided:   - review of home exercise program     Written Home Exercises Provided: Patient instructed to cont prior HEP. Exercises were reviewed and Brandee was able to demonstrate them prior to the end of the session.  Brandee demonstrated good  understanding of the education provided. See EMR under Patient Instructions for exercises provided during therapy sessions    Assessment     Initially:  Brandee is a 73 y.o. female referred to outpatient Physical Therapy s/p L BRYAN.  Patient presents with decreased L hip ROM, strength, flexibility, endurance, functional capacity secondary to recent surgery which we will work to address through skilled therapy at this time.  R hip not doing much better here from a ROM and strength standpoint.    Currently:  Brandee did well with exercises but did have difficulty staying on task.Today was her first visit after evaluation and she reported no pain in left hip. Exercises were kept simple due to pain in right hip. She had difficulty keeping count initially but did improve as session progressed. Will progress as tolerated.    Brandee Is progressing well towards her goals.   Pt prognosis is Good.     Pt will continue to benefit from skilled outpatient physical therapy to address the deficits listed in the problem list box on initial evaluation, provide pt/family education and to maximize pt's level of independence in the home and community environment.     Pt's spiritual, cultural and educational needs considered and pt agreeable to plan of care and goals.     Anticipated barriers to physical therapy: possible need for R BRYAN down the road, 2 months out and ROM still very limited, depression    Goals:   Short Term Goals: 3 weeks   Pt will be independent with HEP for continued progression beyond skilled therapy.  Pt will be able to perform a sit to stand without increased L hip pain.  Pt will  "be able to amb I without pain or deviation from L hip.  Pt will be able to perform a bodyweight squat without increased L hip pain.      Long Term Goals: 5 weeks   Pt will be able to perform a single leg bridge for 20 seconds on each side without increased hip pain.  Pt will be able to perform a lateral step down from a 6" step without pain or limitation from L hip.  Pt will be able to negotiate stairs reciprocally without pain or limitation from L hip.  Pt will be able to return to all prior ADLs without pain or limitation from L hip.    Plan     Plan of care Certification: 1/9/2024 to 02/16/24.     Outpatient Physical Therapy 2 times weekly for 6 weeks to include the following interventions: Gait Training, Manual Therapy, Neuromuscular Re-ed, Patient Education, Therapeutic Activities, and Therapeutic Exercise.     Denia Kendall PTA     "

## 2024-01-17 ENCOUNTER — CLINICAL SUPPORT (OUTPATIENT)
Dept: REHABILITATION | Facility: HOSPITAL | Age: 74
End: 2024-01-17
Payer: MEDICARE

## 2024-01-17 DIAGNOSIS — Z96.642 STATUS POST TOTAL HIP REPLACEMENT, LEFT: Primary | ICD-10-CM

## 2024-01-17 PROCEDURE — 97112 NEUROMUSCULAR REEDUCATION: CPT | Mod: CQ

## 2024-01-17 PROCEDURE — 97530 THERAPEUTIC ACTIVITIES: CPT | Mod: CQ

## 2024-01-17 NOTE — PROGRESS NOTES
"[facility]   Physical Therapy Treatment Note      Name: Brandee Contreras  Clinic Number: [id number]    Therapy Diagnosis:   No diagnosis found.    Physician: He Young MD    Visit Date: 1/17/2024    Physician Orders: PT Eval and Treat  Medical Diagnosis from Referral: s/p L BRYAN  Evaluation Date: 1/9/2024  Authorization Period Expiration: 12/27/24  Plan of Care Expiration: 02/16/24  Progress Note Due: 02/09/24  Visit # / Visits authorized: 3/ Pending   FOTO: 58 at IE     Precautions: Standard     PTA Visit #: 2/5     Time In: 10:11 am  Time Out: 10:53 am  Total Billable Time: 42 minutes    Subjective     Pt reports: she has not been very compliant with home exercise program.  She was not compliant with home exercise program.  Response to previous treatment: no complaint from treatment  Functional change: not at this time     Pain: 0/10 in left, right hurts  Location: left hip      Objective      Objective Measures updated at progress report unless specified.     Treatment     Brandee received the treatments listed below:      therapeutic exercises to develop strength, endurance, ROM, and flexibility for 6 minutes including:  NuStep x 5 minutes (not performed today)   Bent knee fallouts 10 x 10 second hold   Ball roll flexion/extension x 3 minutes     neuromuscular re-education activities to improve: Balance, Coordination, and Proprioception for 10 minutes. The following activities were included:  Mini rail squats to work on sit to stand 2 x 10  Single leg stance 10 x 5 second hold   Forward step ups 4" x 20    therapeutic activities to improve functional performance for 26 minutes, including:  Hooklying hip abduction with green x 20 with 5 second hold   Hooklying hip flexion with green x 20 on left only  Hooklying hip adduction squeezes 10 x 10 second hold   Bridges x 20 with 5 second hold   Calf raises x 20  Standing hip abduction x 20 left only  Standing hamstring curls x 20 " on left       gait training to improve functional mobility and safety for 0  minutes, including:  (not performed today)       Patient Education and Home Exercises       Education provided:   - review of home exercise program     Written Home Exercises Provided: Patient instructed to cont prior HEP. Exercises were reviewed and Brandee was able to demonstrate them prior to the end of the session.  Brandee demonstrated good  understanding of the education provided. See EMR under Patient Instructions for exercises provided during therapy sessions    Assessment     Initially:  Brandee is a 73 y.o. female referred to outpatient Physical Therapy s/p L BRYAN.  Patient presents with decreased L hip ROM, strength, flexibility, endurance, functional capacity secondary to recent surgery which we will work to address through skilled therapy at this time.  R hip not doing much better here from a ROM and strength standpoint.    Currently:  Brandee was able to progress with level of difficulty in exercises. She was tight in hip adductors. She was able to do single leg stance for up to 5 seconds without handhold. Mini rail squats were a little painful on right. Plan to add leg press and leg curl next visit if patient is not sore.    Brandee Is progressing well towards her goals.   Pt prognosis is Good.     Pt will continue to benefit from skilled outpatient physical therapy to address the deficits listed in the problem list box on initial evaluation, provide pt/family education and to maximize pt's level of independence in the home and community environment.     Pt's spiritual, cultural and educational needs considered and pt agreeable to plan of care and goals.     Anticipated barriers to physical therapy: possible need for R BRYAN down the road, 2 months out and ROM still very limited, depression    Goals:   Short Term Goals: 3 weeks   Pt will be independent with HEP for continued progression beyond skilled therapy.  Pt will be able to perform  "a sit to stand without increased L hip pain.  Pt will be able to amb I without pain or deviation from L hip.  Pt will be able to perform a bodyweight squat without increased L hip pain.      Long Term Goals: 5 weeks   Pt will be able to perform a single leg bridge for 20 seconds on each side without increased hip pain.  Pt will be able to perform a lateral step down from a 6" step without pain or limitation from L hip.  Pt will be able to negotiate stairs reciprocally without pain or limitation from L hip.  Pt will be able to return to all prior ADLs without pain or limitation from L hip.    Plan     Plan of care Certification: 1/9/2024 to 02/16/24.     Outpatient Physical Therapy 2 times weekly for 6 weeks to include the following interventions: Gait Training, Manual Therapy, Neuromuscular Re-ed, Patient Education, Therapeutic Activities, and Therapeutic Exercise.     Denia Kendall, PTA   01/17/2024      "

## 2024-01-18 ENCOUNTER — CLINICAL SUPPORT (OUTPATIENT)
Dept: REHABILITATION | Facility: HOSPITAL | Age: 74
End: 2024-01-18
Payer: MEDICARE

## 2024-01-18 DIAGNOSIS — R26.9 GAIT DIFFICULTY: ICD-10-CM

## 2024-01-18 DIAGNOSIS — Z96.642 STATUS POST TOTAL HIP REPLACEMENT, LEFT: Primary | ICD-10-CM

## 2024-01-18 PROCEDURE — 97530 THERAPEUTIC ACTIVITIES: CPT

## 2024-01-18 PROCEDURE — 97112 NEUROMUSCULAR REEDUCATION: CPT

## 2024-01-18 PROCEDURE — 97110 THERAPEUTIC EXERCISES: CPT

## 2024-01-18 NOTE — PROGRESS NOTES
[facility]   Physical Therapy Treatment Note      Name: Brandee Contreras  Clinic Number: [id number]    Therapy Diagnosis:        Encounter Diagnosis   Name Primary?    Status post total hip replacement, left Yes       Physician: He Young MD    Visit Date: 1/18/2024    Physician Orders: PT Eval and Treat  Medical Diagnosis from Referral: s/p L BRYAN  Evaluation Date: 1/9/2024  Authorization Period Expiration: 12/27/24  Plan of Care Expiration: 02/16/24  Date of Surgery: 11/14/2023  Visit # / Visits authorized: 4/ no limit   FOTO: 58 at IE     Precautions: Standard     PTA Visit #: 0/5     Time In: 10:59 am  Time Out: 11:47 am  Total Billable Time: 48 minutes    Subjective     Pt reports: her fixed hip is doing great, it's the right one that is hurting, especially in the groin  She was not compliant with home exercise program.  Response to previous treatment: no complaint from treatment  Functional change: not at this time     Pain: 0/10 in left, 6-7/10 right hip  Location: hip      Objective      Objective Measures updated at progress report unless specified.     Treatment     Brandee received the treatments listed below:      therapeutic exercises to develop strength, endurance, ROM, and flexibility for 18 minutes including:  NuStep x 3 minutes    Bent knee fallouts 10 x 10 second hold   Ball roll flexion/extension - peanut x 30   Seated hamstring curls 3 plates x 30    neuromuscular re-education activities to improve: Balance, Coordination, and Proprioception for 15 minutes. The following activities were included:  Hooklying hip abduction with green x 30 with 3 second hold   Hooklying hip flexion with green - on left only  Hooklying hip adduction with green x 30  Bridges x 20 with 5 second hold    therapeutic activities to improve functional performance for 15 minutes, including:  Mini rail squats to work on sit to stand -  Single leg stance 10 x 10 second hold - left  Forward  "step ups 4" x 30 - increase to 6" next visit  Straight leg raise x 20  Leg press -- add next visit   Calf raises -  Standing hip abduction - left only  Standing hamstring curls - on left       gait training to improve functional mobility and safety for 0  minutes, including:  (not performed today)       Patient Education and Home Exercises       Education provided:   - review of home exercise program     Written Home Exercises Provided: Patient instructed to cont prior HEP. Exercises were reviewed and Brandee was able to demonstrate them prior to the end of the session.  Brandee demonstrated good  understanding of the education provided. See EMR under Patient Instructions for exercises provided during therapy sessions    Assessment     Initially:  Brandee is a 73 y.o. female referred to outpatient Physical Therapy s/p L BRYAN.  Patient presents with decreased L hip ROM, strength, flexibility, endurance, functional capacity secondary to recent surgery which we will work to address through skilled therapy at this time.  R hip not doing much better here from a ROM and strength standpoint.    Currently:  Brandee arrived with no pain in the left hip that was replaced but 6-7/10 pain in the right. She was able to add straight leg raise and seated hamstring curls with straight leg raises being the most challenging. She was able to do single leg stance for 10 seconds without handhold. Will increase height of step for step ups next visit and add leg press. She is progressing well on the postoperative side but is having pain on the nonop side. Will progress toward goals as tolerated following total hip protocol.    Brandee Is progressing well towards her goals.   Pt prognosis is Good.     Pt will continue to benefit from skilled outpatient physical therapy to address the deficits listed in the problem list box on initial evaluation, provide pt/family education and to maximize pt's level of independence in the home and community " "environment.     Pt's spiritual, cultural and educational needs considered and pt agreeable to plan of care and goals.     Anticipated barriers to physical therapy: possible need for R BRYAN down the road, 2 months out and ROM still very limited, depression    Goals:   Short Term Goals: 3 weeks   Pt will be independent with HEP for continued progression beyond skilled therapy.  Pt will be able to perform a sit to stand without increased L hip pain.  Pt will be able to amb I without pain or deviation from L hip.  Pt will be able to perform a bodyweight squat without increased L hip pain.      Long Term Goals: 5 weeks   Pt will be able to perform a single leg bridge for 20 seconds on each side without increased hip pain.  Pt will be able to perform a lateral step down from a 6" step without pain or limitation from L hip.  Pt will be able to negotiate stairs reciprocally without pain or limitation from L hip.  Pt will be able to return to all prior ADLs without pain or limitation from L hip.    Plan     Plan of care Certification: 1/9/2024 to 02/16/24.     Outpatient Physical Therapy 2 times weekly for 6 weeks to include the following interventions: Gait Training, Manual Therapy, Neuromuscular Re-ed, Patient Education, Therapeutic Activities, and Therapeutic Exercise.     ROSE TRAN, PT   01/18/2024    "

## 2024-01-23 ENCOUNTER — CLINICAL SUPPORT (OUTPATIENT)
Dept: REHABILITATION | Facility: HOSPITAL | Age: 74
End: 2024-01-23
Payer: MEDICARE

## 2024-01-23 DIAGNOSIS — R26.9 GAIT DIFFICULTY: Primary | ICD-10-CM

## 2024-01-23 PROCEDURE — 97112 NEUROMUSCULAR REEDUCATION: CPT | Mod: CQ

## 2024-01-23 PROCEDURE — 97110 THERAPEUTIC EXERCISES: CPT | Mod: CQ

## 2024-01-23 PROCEDURE — 97530 THERAPEUTIC ACTIVITIES: CPT | Mod: CQ

## 2024-01-24 ENCOUNTER — HOSPITAL ENCOUNTER (OUTPATIENT)
Dept: RADIOLOGY | Facility: HOSPITAL | Age: 74
Discharge: HOME | End: 2024-01-24
Attending: ORTHOPAEDIC SURGERY
Payer: MEDICARE

## 2024-01-24 ENCOUNTER — OFFICE VISIT (OUTPATIENT)
Dept: ORTHOPEDICS | Facility: CLINIC | Age: 74
End: 2024-01-24
Payer: MEDICARE

## 2024-01-24 VITALS — WEIGHT: 135 LBS | BODY MASS INDEX: 23.05 KG/M2 | HEIGHT: 64 IN

## 2024-01-24 DIAGNOSIS — Z96.642 STATUS POST HIP REPLACEMENT, LEFT: ICD-10-CM

## 2024-01-24 DIAGNOSIS — Z96.642 STATUS POST HIP REPLACEMENT, LEFT: Primary | ICD-10-CM

## 2024-01-24 PROCEDURE — 73502 X-RAY EXAM HIP UNI 2-3 VIEWS: CPT | Mod: 26,LT,, | Performed by: ORTHOPAEDIC SURGERY

## 2024-01-24 PROCEDURE — 99024 POSTOP FOLLOW-UP VISIT: CPT | Mod: ,,, | Performed by: ORTHOPAEDIC SURGERY

## 2024-01-24 PROCEDURE — 1159F MED LIST DOCD IN RCRD: CPT | Mod: CPTII,,, | Performed by: ORTHOPAEDIC SURGERY

## 2024-01-24 PROCEDURE — 99213 OFFICE O/P EST LOW 20 MIN: CPT | Mod: PBBFAC | Performed by: ORTHOPAEDIC SURGERY

## 2024-01-24 PROCEDURE — 73502 X-RAY EXAM HIP UNI 2-3 VIEWS: CPT | Mod: TC,LT

## 2024-01-24 NOTE — PROGRESS NOTES
Radiology Interpretation        Patient Name: Brandee Contreras  Date: 1/24/2024  YOB: 1950  MRN# [id number]        ORDERING DIAGNOSIS:    Encounter Diagnosis   Name Primary?    Status post hip replacement, left Yes      AP pelvis skeletally mature individual there is total hip arthroplasty in place on left no loosening on the right she has some degenerative changes noted there are no fractures or subluxations impression degenerative changes right hip with total hip arthroplasty in place on left no loosening                 He Young MD

## 2024-01-24 NOTE — PROGRESS NOTES
Radiology Interpretation        Patient Name: Brandee Contreras  Date: 1/24/2024  YOB: 1950  MRN# [id number]        ORDERING DIAGNOSIS:    Encounter Diagnosis   Name Primary?    Status post hip replacement, left Yes        AP lateral left hip skeletally mature individual total hip arthroplasty in place no loosening fractures or subluxations impression total hip arthroplasty in place left hip no loosening             He Young MD

## 2024-01-24 NOTE — PROGRESS NOTES
Patient is here for follow-up of her left total hip arthroplasty she is doing well.  She is walking with a cane.  Her right hip is hurting her currently she has degenerative changes right hip her left hip she has having no pain she has good motion no instability follow her up in 3 months

## 2024-01-25 ENCOUNTER — CLINICAL SUPPORT (OUTPATIENT)
Dept: REHABILITATION | Facility: HOSPITAL | Age: 74
End: 2024-01-25
Payer: MEDICARE

## 2024-01-25 DIAGNOSIS — R26.9 GAIT DIFFICULTY: Primary | ICD-10-CM

## 2024-01-25 PROCEDURE — 97530 THERAPEUTIC ACTIVITIES: CPT | Mod: CQ

## 2024-01-25 PROCEDURE — 97110 THERAPEUTIC EXERCISES: CPT | Mod: CQ

## 2024-01-25 PROCEDURE — 97112 NEUROMUSCULAR REEDUCATION: CPT | Mod: CQ

## 2024-01-25 NOTE — PROGRESS NOTES
"[facility]   Physical Therapy Treatment Note      Name: Brandee Contreras  Clinic Number: [id number]    Therapy Diagnosis:        Encounter Diagnosis   Name Primary?    Status post total hip replacement, left Yes       Physician: He Young MD    Visit Date: 1/25/2024    Physician Orders: PT Eval and Treat  Medical Diagnosis from Referral: s/p L BRYAN  Evaluation Date: 1/9/2024  Authorization Period Expiration: 12/27/24  Plan of Care Expiration: 02/16/24  Date of Surgery: 11/14/2023  Visit # / Visits authorized: 6/ no limit   FOTO: 58 at IE     Precautions: Standard     PTA Visit #: 2/5     Time In: 9:20 am  Time Out: 10:30 am  Total Billable Time: 40 minutes    Subjective     Pt reports: the right hip continues to be painful.  She was not compliant with home exercise program.  Response to previous treatment: no complaint from treatment  Functional change: not at this time     Pain: 0/10 in left, 6-7/10 right hip  Location: hip      Objective      Objective Measures updated at progress report unless specified.     Treatment     Brandee received the treatments listed below:      therapeutic exercises to develop strength, endurance, ROM, and flexibility for 18 minutes including:  NuStep x 5 minutes   Bent knee fallouts 10 x 10 second hold   Ball roll flexion/extension - peanut x 5 minutes    Seated hamstring curls 3 plates x 30    neuromuscular re-education activities to improve: Balance, Coordination, and Proprioception for 15 minutes. The following activities were included:  Hooklying hip abduction with green x 30 with 3 second hold   Hooklying hip flexion with green x 30 on left only  Hooklying hip adduction with green x 30  Bridges x 30 with 5 second hold    therapeutic activities to improve functional performance for 15 minutes, including:  Single leg stance 5 x 15 second hold - left (not performed today)   Forward step ups 6" x 20   Straight leg raise x 20    Leg press 4 plates " x 20    gait training to improve functional mobility and safety for 0  minutes, including:  (not performed today)       Patient Education and Home Exercises       Education provided:   - review of home exercise program     Written Home Exercises Provided: Patient instructed to cont prior HEP. Exercises were reviewed and Brandee was able to demonstrate them prior to the end of the session.  Brandee demonstrated good  understanding of the education provided. See EMR under Patient Instructions for exercises provided during therapy sessions    Assessment     Initially:  Brandee is a 73 y.o. female referred to outpatient Physical Therapy s/p L BRYAN.  Patient presents with decreased L hip ROM, strength, flexibility, endurance, functional capacity secondary to recent surgery which we will work to address through skilled therapy at this time.  R hip not doing much better here from a ROM and strength standpoint.    Currently:  Brandee continues to make good progress. She was ambulating with antalgic gait on arrival but was able to walk much smoother at end of treatment. She is progressing well on the postoperative side but is having pain on the nonop side. Will progress toward goals as tolerated following total hip protocol.    Brandee Is progressing well towards her goals.   Pt prognosis is Good.     Pt will continue to benefit from skilled outpatient physical therapy to address the deficits listed in the problem list box on initial evaluation, provide pt/family education and to maximize pt's level of independence in the home and community environment.     Pt's spiritual, cultural and educational needs considered and pt agreeable to plan of care and goals.     Anticipated barriers to physical therapy: possible need for R BRYAN down the road, 2 months out and ROM still very limited, depression    Goals:   Short Term Goals: 3 weeks   Pt will be independent with HEP for continued progression beyond skilled therapy.  Pt will be able to  "perform a sit to stand without increased L hip pain.  Pt will be able to amb I without pain or deviation from L hip.  Pt will be able to perform a bodyweight squat without increased L hip pain.      Long Term Goals: 5 weeks   Pt will be able to perform a single leg bridge for 20 seconds on each side without increased hip pain.  Pt will be able to perform a lateral step down from a 6" step without pain or limitation from L hip.  Pt will be able to negotiate stairs reciprocally without pain or limitation from L hip.  Pt will be able to return to all prior ADLs without pain or limitation from L hip.    Plan     Plan of care Certification: 1/9/2024 to 02/16/24.     Outpatient Physical Therapy 2 times weekly for 6 weeks to include the following interventions: Gait Training, Manual Therapy, Neuromuscular Re-ed, Patient Education, Therapeutic Activities, and Therapeutic Exercise.     Denia Kendall, PTA   01/25/2024    "

## 2024-01-30 ENCOUNTER — CLINICAL SUPPORT (OUTPATIENT)
Dept: REHABILITATION | Facility: HOSPITAL | Age: 74
End: 2024-01-30
Payer: MEDICARE

## 2024-01-30 DIAGNOSIS — R26.9 GAIT DIFFICULTY: Primary | ICD-10-CM

## 2024-01-30 PROCEDURE — 97530 THERAPEUTIC ACTIVITIES: CPT | Mod: CQ

## 2024-01-30 PROCEDURE — 97150 GROUP THERAPEUTIC PROCEDURES: CPT | Mod: CQ

## 2024-01-30 PROCEDURE — 97112 NEUROMUSCULAR REEDUCATION: CPT | Mod: CQ,59

## 2024-01-30 NOTE — PROGRESS NOTES
[facility] OUTPATIENT THERAPY AND [facility]   Physical Therapy Treatment Note      Name: Brandee Contreras  Clinic Number: [id number]    Therapy Diagnosis:        Encounter Diagnosis   Name Primary?    Status post total hip replacement, left Yes       Physician: He Young MD    Visit Date: 1/30/2024    Physician Orders: PT Eval and Treat  Medical Diagnosis from Referral: s/p L BRYAN  Evaluation Date: 1/9/2024  Authorization Period Expiration: 12/27/24  Plan of Care Expiration: 02/16/24  Date of Surgery: 11/14/2023  Visit # / Visits authorized: 7/ no limit   FOTO: 58 at IE     Precautions: Standard     PTA Visit #: 3/5     Time In: 10:02 am  Time Out:  10:56 am  Total Billable Time: 23 individual minutes (13 min group and 18 min non billed)    Subjective     Pt reports: the right hip continues to be painful.  L hip feeling good  She was not compliant with home exercise program.  Response to previous treatment: no complaint from treatment  Functional change: not at this time     Pain: 0/10 in left, 6-7/10 right hip  Location: hip      Objective      Objective Measures updated at progress report unless specified.     Treatment     Brandee received the treatments listed below:      therapeutic exercises to develop strength, endurance, ROM, and flexibility for 0 individual minutes including:  NuStep x 5 minutes   Bent knee fallouts 10 x 10 second hold   Seated hamstring curls 3 plates x 30 -Charged as Group  Derek stretch--2 x 1 min -Charged as Group   Seated IR--Red band--3 x 10 -Charged as Group   ER Iso--3 x 10    (Not Today)  Ball roll flexion/extension - peanut x 5 minutes      neuromuscular re-education activities to improve: Balance, Coordination, and Proprioception for 15 individual minutes. The following activities were included:  Hooklying hip abduction with green x 30 with 3 second hold   Hooklying hip adduction with green x 30  Bridges x 30 with 5 second hold--green band    (Not Today)  Hooklying hip  "flexion with green x 30 on left only    therapeutic activities to improve functional performance for 8 individual minutes minutes, including:  Single leg stance 5 x 15 second hold - left (not performed today)   Forward step ups 6" x 20   Straight leg raise x 20    Leg press 4 plates x 20    gait training to improve functional mobility and safety for 0  minutes, including:  (not performed today)       Patient Education and Home Exercises       Education provided:   - review of home exercise program     Written Home Exercises Provided: Patient instructed to cont prior HEP. Exercises were reviewed and Brandee was able to demonstrate them prior to the end of the session.  Brandee demonstrated good  understanding of the education provided. See EMR under Patient Instructions for exercises provided during therapy sessions    Assessment     Initially:  Brandee is a 73 y.o. female referred to outpatient Physical Therapy s/p L BRYAN.  Patient presents with decreased L hip ROM, strength, flexibility, endurance, functional capacity secondary to recent surgery which we will work to address through skilled therapy at this time.  R hip not doing much better here from a ROM and strength standpoint.    Currently:  Brandee continues to make good progress. She continues to have R hip pain... lef hip is feeling good.   She is progressing well on the postoperative side but is having pain on the nonop side. Will progress toward goals as tolerated following total hip protocol.    Brandee Is progressing well towards her goals.   Pt prognosis is Good.     Pt will continue to benefit from skilled outpatient physical therapy to address the deficits listed in the problem list box on initial evaluation, provide pt/family education and to maximize pt's level of independence in the home and community environment.     Pt's spiritual, cultural and educational needs considered and pt agreeable to plan of care and goals.     Anticipated barriers to physical " "therapy: possible need for R BRYAN down the road, 2 months out and ROM still very limited, depression    Goals:   Short Term Goals: 3 weeks   Pt will be independent with HEP for continued progression beyond skilled therapy.  Pt will be able to perform a sit to stand without increased L hip pain.  Pt will be able to amb I without pain or deviation from L hip.  Pt will be able to perform a bodyweight squat without increased L hip pain.      Long Term Goals: 5 weeks   Pt will be able to perform a single leg bridge for 20 seconds on each side without increased hip pain.  Pt will be able to perform a lateral step down from a 6" step without pain or limitation from L hip.  Pt will be able to negotiate stairs reciprocally without pain or limitation from L hip.  Pt will be able to return to all prior ADLs without pain or limitation from L hip.    Plan     Plan of care Certification: 1/9/2024 to 02/16/24.     Outpatient Physical Therapy 2 times weekly for 6 weeks to include the following interventions: Gait Training, Manual Therapy, Neuromuscular Re-ed, Patient Education, Therapeutic Activities, and Therapeutic Exercise.     Gama Lawson, PTA   01/30/2024    "

## 2024-01-31 NOTE — PROGRESS NOTES
[facility]   Physical Therapy Treatment Note      Name: Brandee Contreras  Clinic Number: [id number]    Therapy Diagnosis:        Encounter Diagnosis   Name Primary?    Status post total hip replacement, left Yes       Physician: He Young MD    Visit Date: 2/1/2024    Physician Orders: PT Eval and Treat  Medical Diagnosis from Referral: s/p L BRYAN  Evaluation Date: 1/9/2024  Authorization Period Expiration: 12/27/24  Plan of Care Expiration: 02/16/24  Date of Surgery: 11/14/2023  Visit # / Visits authorized: 8/ no limit   FOTO: 58 at IE               ***     Precautions: Standard     PTA Visit #: 4/5     Time In: 10:02*** am  Time Out:  10:56 am  Total Billable Time: 23 individual minutes (13 min group and 18 min non billed)    Subjective     Pt reports: the right hip continues to be painful.  L hip feeling good  She was not compliant with home exercise program.  Response to previous treatment: no complaint from treatment  Functional change: not at this time     Pain: 0/10 in left, 6-7/10 right hip  Location: hip      Objective      Objective Measures updated at progress report unless specified.     Treatment     Brandee received the treatments listed below:      therapeutic exercises to develop strength, endurance, ROM, and flexibility for 0 individual minutes including:  NuStep x 5 minutes   Bent knee fallouts 10 x 10 second hold   Seated hamstring curls 3 plates x 30 -Charged as Group  Derek stretch--2 x 1 min -Charged as Group   Seated IR--Red band--3 x 10 -Charged as Group   ER Iso--3 x 10    (Not Today)  Ball roll flexion/extension - peanut x 5 minutes      neuromuscular re-education activities to improve: Balance, Coordination, and Proprioception for 15 individual minutes. The following activities were included:  Hooklying hip abduction with green x 30 with 3 second hold   Hooklying hip adduction with green x 30  Bridges x 30 with 5 second hold--green band    (Not  "Today)  Hooklying hip flexion with green x 30 on left only    therapeutic activities to improve functional performance for 8 individual minutes minutes, including:  Single leg stance 5 x 15 second hold - left (not performed today)   Forward step ups 6" x 20   Straight leg raise x 20    Leg press 4 plates x 20    gait training to improve functional mobility and safety for 0  minutes, including:  (not performed today)       Patient Education and Home Exercises       Education provided:   - review of home exercise program     Written Home Exercises Provided: Patient instructed to cont prior HEP. Exercises were reviewed and Brandee was able to demonstrate them prior to the end of the session.  Brandee demonstrated good  understanding of the education provided. See EMR under Patient Instructions for exercises provided during therapy sessions    Assessment     Initially:  Brandee is a 73 y.o. female referred to outpatient Physical Therapy s/p L BRYAN.  Patient presents with decreased L hip ROM, strength, flexibility, endurance, functional capacity secondary to recent surgery which we will work to address through skilled therapy at this time.  R hip not doing much better here from a ROM and strength standpoint.    Currently:  Brandee continues to make good progress. She continues to have R hip pain... lef hip is feeling good.   She is progressing well on the postoperative side but is having pain on the nonop side. Will progress toward goals as tolerated following total hip protocol.    Brandee Is progressing well towards her goals.   Pt prognosis is Good.     Pt will continue to benefit from skilled outpatient physical therapy to address the deficits listed in the problem list box on initial evaluation, provide pt/family education and to maximize pt's level of independence in the home and community environment.     Pt's spiritual, cultural and educational needs considered and pt agreeable to plan of care and goals.     Anticipated " "barriers to physical therapy: possible need for R BRYAN down the road, 2 months out and ROM still very limited, depression    Goals:   Short Term Goals: 3 weeks   Pt will be independent with HEP for continued progression beyond skilled therapy.  Pt will be able to perform a sit to stand without increased L hip pain.  Pt will be able to amb I without pain or deviation from L hip.  Pt will be able to perform a bodyweight squat without increased L hip pain.      Long Term Goals: 5 weeks   Pt will be able to perform a single leg bridge for 20 seconds on each side without increased hip pain.  Pt will be able to perform a lateral step down from a 6" step without pain or limitation from L hip.  Pt will be able to negotiate stairs reciprocally without pain or limitation from L hip.  Pt will be able to return to all prior ADLs without pain or limitation from L hip.    Plan     Plan of care Certification: 1/9/2024 to 02/16/24.     Outpatient Physical Therapy 2 times weekly for 6 weeks to include the following interventions: Gait Training, Manual Therapy, Neuromuscular Re-ed, Patient Education, Therapeutic Activities, and Therapeutic Exercise.     Denia Kendall, PTA   01/31/2024    "

## 2024-02-01 ENCOUNTER — CLINICAL SUPPORT (OUTPATIENT)
Dept: REHABILITATION | Facility: HOSPITAL | Age: 74
End: 2024-02-01
Payer: MEDICARE

## 2024-02-01 DIAGNOSIS — R26.9 GAIT DIFFICULTY: Primary | ICD-10-CM

## 2024-02-01 PROCEDURE — 97530 THERAPEUTIC ACTIVITIES: CPT

## 2024-02-01 PROCEDURE — 97110 THERAPEUTIC EXERCISES: CPT

## 2024-02-01 PROCEDURE — 97112 NEUROMUSCULAR REEDUCATION: CPT

## 2024-02-01 NOTE — PROGRESS NOTES
[facility]   Physical Therapy Treatment Note      Name: Brandee Contreras  Clinic Number: [id number]    Therapy Diagnosis:        Encounter Diagnosis   Name Primary?    Status post total hip replacement, left Yes       Physician: He Young MD    Visit Date: 2/1/2024    Physician Orders: PT Eval and Treat  Medical Diagnosis from Referral: s/p L BRYAN  Evaluation Date: 1/9/2024  Authorization Period Expiration: 12/27/24  Plan of Care Expiration: 02/16/24  Date of Surgery: 11/14/2023  Visit # / Visits authorized: 8/ no limit   FOTO: 58 at IE                    Precautions: Standard     PTA Visit #: 4/5     Time In: 10:02 am  Time Out:  10:52 am  Total Billable Time: 50 individual minutes     Subjective     Pt reports: the Left hip is healing well but right continues to be painful.   She was not compliant with home exercise program.  Response to previous treatment: no complaint from treatment  Functional change: not at this time     Pain: 0/10 in left, 6-7/10 right hip  Location: hip      Objective      Objective Measures updated at progress report unless specified.     Treatment     Brandee received the treatments listed below:      therapeutic exercises to develop strength, endurance, ROM, and flexibility for 10 individual minutes including:  NuStep x 5 minutes   Bent knee fallouts 10 x 10 second hold   Derek stretch--2 x 1 min   Seated IR--Red band--3 x 10   ER Iso--3 x 10    (Not Today)  Ball roll flexion/extension - peanut x 5 minutes      neuromuscular re-education activities to improve: Balance, Coordination, and Proprioception for 15 individual minutes. The following activities were included:  Hooklying hip abduction with green x 30 with 3 second hold   Hooklying marches with green x 30  Bridges x 30 with 5 second hold--green band    (Not Today)  Hooklying hip flexion with green x 30 on left only    therapeutic activities to improve functional performance for 25 individual  "minutes minutes, including:  Single leg stance 5 x 15 second hold - left (not performed today)   Forward step ups 6" x 20   Straight leg raise x 20    Cybex Leg press 4 plates x 20  Cybex Seated hamstring curls 3 plates x 30     gait training to improve functional mobility and safety for 0  minutes, including:  (not performed today)       Patient Education and Home Exercises       Education provided:   - review of home exercise program     Written Home Exercises Provided: Patient instructed to cont prior HEP. Exercises were reviewed and Brandee was able to demonstrate them prior to the end of the session.  Brandee demonstrated good  understanding of the education provided. See EMR under Patient Instructions for exercises provided during therapy sessions    Assessment     Initially:  Brandee is a 73 y.o. female referred to outpatient Physical Therapy s/p L BRYAN.  Patient presents with decreased L hip ROM, strength, flexibility, endurance, functional capacity secondary to recent surgery which we will work to address through skilled therapy at this time.  R hip not doing much better here from a ROM and strength standpoint.    Currently:  Brandee is making excellent progress. Her post surgery hip (Left Hip) is doing very well. Her Right hip is actually her most limiting factor at this time. She has pain in the Right hip with most activities. Therapist had her perform Cybex Exercises as well as mat exercises with emphasis on hip strengthening in all directions. We will continue to progress her as tolerated.         Brandee Is progressing well towards her goals.   Pt prognosis is Good.     Pt will continue to benefit from skilled outpatient physical therapy to address the deficits listed in the problem list box on initial evaluation, provide pt/family education and to maximize pt's level of independence in the home and community environment.     Pt's spiritual, cultural and educational needs considered and pt agreeable to plan of " "care and goals.     Anticipated barriers to physical therapy: possible need for R BRYAN down the road, 2 months out and ROM still very limited, depression    Goals:   Short Term Goals: 3 weeks   Pt will be independent with HEP for continued progression beyond skilled therapy.  Pt will be able to perform a sit to stand without increased L hip pain.  Pt will be able to amb I without pain or deviation from L hip.  Pt will be able to perform a bodyweight squat without increased L hip pain.      Long Term Goals: 5 weeks   Pt will be able to perform a single leg bridge for 20 seconds on each side without increased hip pain.  Pt will be able to perform a lateral step down from a 6" step without pain or limitation from L hip.  Pt will be able to negotiate stairs reciprocally without pain or limitation from L hip.  Pt will be able to return to all prior ADLs without pain or limitation from L hip.    Plan     Plan of care Certification: 1/9/2024 to 02/16/24.     Outpatient Physical Therapy 2 times weekly for 6 weeks to include the following interventions: Gait Training, Manual Therapy, Neuromuscular Re-ed, Patient Education, Therapeutic Activities, and Therapeutic Exercise.     CLAUS RIVAS, PT   02/01/2024    "

## 2024-02-06 ENCOUNTER — CLINICAL SUPPORT (OUTPATIENT)
Dept: REHABILITATION | Facility: HOSPITAL | Age: 74
End: 2024-02-06
Payer: MEDICARE

## 2024-02-06 DIAGNOSIS — R26.9 GAIT DIFFICULTY: Primary | ICD-10-CM

## 2024-02-06 PROCEDURE — 97530 THERAPEUTIC ACTIVITIES: CPT | Mod: CQ

## 2024-02-06 PROCEDURE — 97112 NEUROMUSCULAR REEDUCATION: CPT | Mod: CQ

## 2024-02-06 NOTE — PROGRESS NOTES
"[facility]   Physical Therapy Treatment Note      Name: Brandee Contreras  Clinic Number: [id number]    Therapy Diagnosis:        Encounter Diagnosis   Name Primary?    Status post total hip replacement, left Yes       Physician: He Young MD    Visit Date: 2/6/2024    Physician Orders: PT Eval and Treat  Medical Diagnosis from Referral: s/p L BRYAN  Evaluation Date: 1/9/2024  Authorization Period Expiration: 12/27/24  Plan of Care Expiration: 02/16/24  Date of Surgery: 11/14/2023  Visit # / Visits authorized: 9/ no limit   FOTO: 58 at IE                    Precautions: Standard     PTA Visit #: 1/5     Time In: 10:00 am  Time Out:  10:45 am  Total Billable Time: 45 individual minutes     Subjective     Pt reports: no complaints with left hip but right continues to be painful.   She was not compliant with home exercise program.  Response to previous treatment: no complaint from treatment  Functional change: not at this time     Pain: 0/10 in left, 7/10 right hip  Location: hip      Objective      Objective Measures updated at progress report unless specified.     Treatment     Brandee received the treatments listed below:      therapeutic exercises to develop strength, endurance, ROM, and flexibility for 12 individual minutes including:  NuStep x 5 minutes   Bent knee fallouts 10 x 10 second hold   Derek stretch--2 x 1 min   Seated IR--Red band--   ER Iso--  Ball roll flexion/extension - peanut x 20      neuromuscular re-education activities to improve: Balance, Coordination, and Proprioception for 10 individual minutes. The following activities were included:   Hooklying marches with green x 30  Bridges with hip abduction x 30 with 5 second hold--green band    therapeutic activities to improve functional performance for 28 individual minutes minutes, including:  Single leg stance 5 x 15 second hold - left (not performed today)   Forward step ups 6" x 20   Straight leg raise x 20 "    Cybex Leg press 4 plates x 30  Cybex Seated hamstring curls 3 plates x 30     gait training to improve functional mobility and safety for 0  minutes, including:  (not performed today)       Patient Education and Home Exercises       Education provided:   - review of home exercise program     Written Home Exercises Provided: Patient instructed to cont prior HEP. Exercises were reviewed and Brandee was able to demonstrate them prior to the end of the session.  Brandee demonstrated good  understanding of the education provided. See EMR under Patient Instructions for exercises provided during therapy sessions    Assessment     Initially:  Brandee is a 73 y.o. female referred to outpatient Physical Therapy s/p L BRYAN.  Patient presents with decreased L hip ROM, strength, flexibility, endurance, functional capacity secondary to recent surgery which we will work to address through skilled therapy at this time.  R hip not doing much better here from a ROM and strength standpoint.    Currently:  Brandee continues to make excellent progress with left hip. She was able to increase repetitions with several activities. Her Right hip is actually her most limiting factor at this time. She has pain in the Right hip with most activities. Therapist had her perform Cybex Exercises as well as mat exercises with emphasis on hip strengthening in all directions. We will continue to progress her as tolerated.         Brandee Is progressing well towards her goals.   Pt prognosis is Good.     Pt will continue to benefit from skilled outpatient physical therapy to address the deficits listed in the problem list box on initial evaluation, provide pt/family education and to maximize pt's level of independence in the home and community environment.     Pt's spiritual, cultural and educational needs considered and pt agreeable to plan of care and goals.     Anticipated barriers to physical therapy: possible need for R BRYAN down the road, 2 months out and  "ROM still very limited, depression    Goals:   Short Term Goals: 3 weeks   Pt will be independent with HEP for continued progression beyond skilled therapy.  Pt will be able to perform a sit to stand without increased L hip pain.  Pt will be able to amb I without pain or deviation from L hip.  Pt will be able to perform a bodyweight squat without increased L hip pain.      Long Term Goals: 5 weeks   Pt will be able to perform a single leg bridge for 20 seconds on each side without increased hip pain.  Pt will be able to perform a lateral step down from a 6" step without pain or limitation from L hip.  Pt will be able to negotiate stairs reciprocally without pain or limitation from L hip.  Pt will be able to return to all prior ADLs without pain or limitation from L hip.    Plan     Plan of care Certification: 1/9/2024 to 02/16/24.     Outpatient Physical Therapy 2 times weekly for 6 weeks to include the following interventions: Gait Training, Manual Therapy, Neuromuscular Re-ed, Patient Education, Therapeutic Activities, and Therapeutic Exercise.     Denia Kendall, PTA   02/06/2024    "

## 2024-02-08 ENCOUNTER — CLINICAL SUPPORT (OUTPATIENT)
Dept: REHABILITATION | Facility: HOSPITAL | Age: 74
End: 2024-02-08
Payer: MEDICARE

## 2024-02-08 DIAGNOSIS — Z96.642 STATUS POST TOTAL HIP REPLACEMENT, LEFT: ICD-10-CM

## 2024-02-08 DIAGNOSIS — R26.9 GAIT DIFFICULTY: Primary | ICD-10-CM

## 2024-02-08 PROCEDURE — 97110 THERAPEUTIC EXERCISES: CPT

## 2024-02-08 PROCEDURE — 97530 THERAPEUTIC ACTIVITIES: CPT

## 2024-02-08 PROCEDURE — 97112 NEUROMUSCULAR REEDUCATION: CPT

## 2024-02-08 NOTE — PROGRESS NOTES
[facility]   Physical Therapy Treatment Note      Name: Brandee Contreras  Clinic Number: [id number]    Therapy Diagnosis:        Encounter Diagnosis   Name Primary?    Status post total hip replacement, left Yes       Physician: He Young MD    Visit Date: 2/8/2024    Physician Orders: PT Eval and Treat  Medical Diagnosis from Referral: s/p L BRYAN  Evaluation Date: 1/9/2024  Authorization Period Expiration: 12/27/24  Plan of Care Expiration: 02/16/24  Date of Surgery: 11/14/2023  Visit # / Visits authorized: 10/ no limit   FOTO: 58 at IE                    Precautions: Standard     PTA Visit #: 0/5     Time In: 10:00 am  Time Out:  10:54 am  Total Billable Time: 54 individual minutes     Subjective     Pt reports: no complaints with left hip but right continues to be painful.   She was not compliant with home exercise program.  Response to previous treatment: no complaint from treatment  Functional change: not at this time     Pain: 0/10 in left, 7/10 right hip  Location: hip      Objective      Objective Measures updated at progress report unless specified.     Treatment     Brandee received the treatments listed below:      therapeutic exercises to develop strength, endurance, ROM, and flexibility for 14  minutes including:  NuStep - minutes   Bike x 3 minutes - increased pain in right (nonop) hip   Slant board stretch x 2 minutes  Bent knee fallouts 10 x 10 second hold   Derek stretch--2 x 1 min - left; x 1 min right  Seated IR--Red band--   ER Iso--   Long axis distraction to right lower extremity 5 x 20 second holds    neuromuscular re-education activities to improve: Balance, Coordination, and Proprioception for 10 minutes. The following activities were included:   Hooklying marches with green -  Bridges with hip abduction x 30 with 5 second hold--blue band  Ball roll flexion/extension into bridge - peanut x 15    therapeutic activities to improve functional performance  "for 30  minutes, including:  Cybex hip abduction 1 plate x 20 each leg   Single leg stance - second hold - left (not performed today)   Forward step ups 6" x 30   Straight leg raise x 30    Cybex Leg press 4 plates -  Cybex Seated hamstring curls 3.5 plates x 30 - increase to 4 plates next visit  Hooklying hip adduction - red x 20 - left     gait training to improve functional mobility and safety for 0  minutes, including:  (not performed today)       Patient Education and Home Exercises       Education provided:   - review of home exercise program     Written Home Exercises Provided: Patient instructed to cont prior HEP. Exercises were reviewed and Brandee was able to demonstrate them prior to the end of the session.  Brandee demonstrated good  understanding of the education provided. See EMR under Patient Instructions for exercises provided during therapy sessions    Assessment     Initially:  Brandee is a 73 y.o. female referred to outpatient Physical Therapy s/p L BRYAN.  Patient presents with decreased L hip ROM, strength, flexibility, endurance, functional capacity secondary to recent surgery which we will work to address through skilled therapy at this time.  R hip not doing much better here from a ROM and strength standpoint.    Currently:  Brandee continues to make excellent progress with left hip. She was able to increase repetitions with several activities. Her Right hip is actually her most limiting factor at this time. She had increased pain in the right hip with stationary bike - she stated the left hip felt fine. Added Cybex hip abduction and banded hip adduction today. She did get some relief in the right hip with long axis distraction. Will increase weight on hamstring curls next visit. We will continue to progress her as tolerated.         Brandee Is progressing well towards her goals.   Pt prognosis is Good.     Pt will continue to benefit from skilled outpatient physical therapy to address the deficits " "listed in the problem list box on initial evaluation, provide pt/family education and to maximize pt's level of independence in the home and community environment.     Pt's spiritual, cultural and educational needs considered and pt agreeable to plan of care and goals.     Anticipated barriers to physical therapy: possible need for R BRYAN down the road, 2 months out and ROM still very limited, depression    Goals:   Short Term Goals: 3 weeks   Pt will be independent with HEP for continued progression beyond skilled therapy.  Pt will be able to perform a sit to stand without increased L hip pain.  Pt will be able to amb I without pain or deviation from L hip.  Pt will be able to perform a bodyweight squat without increased L hip pain.      Long Term Goals: 5 weeks   Pt will be able to perform a single leg bridge for 20 seconds on each side without increased hip pain.  Pt will be able to perform a lateral step down from a 6" step without pain or limitation from L hip.  Pt will be able to negotiate stairs reciprocally without pain or limitation from L hip.  Pt will be able to return to all prior ADLs without pain or limitation from L hip.    Plan     Plan of care Certification: 1/9/2024 to 02/16/24.     Outpatient Physical Therapy 2 times weekly for 6 weeks to include the following interventions: Gait Training, Manual Therapy, Neuromuscular Re-ed, Patient Education, Therapeutic Activities, and Therapeutic Exercise.     ROSE TRAN, PT   02/08/2024  "

## 2024-02-12 NOTE — PROGRESS NOTES
"[facility] OUTPATIENT THERAPY AND WELLNESS   Physical Therapy Treatment Note / Discharge Note      Name: Brandee Contreras  Clinic Number: [id number]    Therapy Diagnosis:        Encounter Diagnosis   Name Primary?    Status post total hip replacement, left Yes       Physician: He Young MD    Visit Date: 2/13/2024    Physician Orders: PT Eval and Treat  Medical Diagnosis from Referral: s/p L BRYAN  Evaluation Date: 1/9/2024  Authorization Period Expiration: 12/27/24  Plan of Care Expiration: 02/16/24  Date of Surgery: 11/14/2023  Visit # / Visits authorized: 11/ no limit   FOTO: 58 at IE, 53 on 2/13                    Precautions: Standard     PTA Visit #: 2/5     Time In: 10:07 am  Time Out:  10:54 am  Total Billable Time: 45 individual minutes     Subjective     Pt reports: left hip is doing so well that she is considering doing the right.  She was not compliant with home exercise program.  Response to previous treatment: no complaint from treatment  Functional change: not at this time     Pain: 0/10 in left, 7/10 right hip  Location: hip      Objective      Objective Measures updated at progress report unless specified.     Treatment     Brandee received the treatments listed below:      therapeutic exercises to develop strength, endurance, ROM, and flexibility for 10  minutes including:  NuStep x 5 minutes   Slant board stretch x 1 minute  Bent knee fallouts 10 x 10 second hold     neuromuscular re-education activities to improve: Balance, Coordination, and Proprioception for 5 minutes. The following activities were included:   Ball roll flexion/extension into bridge - peanut x 15    therapeutic activities to improve functional performance for 30  minutes, including:  Cybex hip abduction 1 plate x 20 each leg   Forward step ups 6" x 30   Straight leg raise x 30    Cybex Leg press 4 plates x 30  Cybex Seated hamstring curls 4 plates x 30     gait training to improve functional mobility and safety for 0  " minutes, including:  (not performed today)       Patient Education and Home Exercises       Education provided:   - review of home exercise program     Written Home Exercises Provided: Patient instructed to cont prior HEP. Exercises were reviewed and Brandee was able to demonstrate them prior to the end of the session.  Brandee demonstrated good  understanding of the education provided. See EMR under Patient Instructions for exercises provided during therapy sessions    Assessment     Initially:  Brandee is a 73 y.o. female referred to outpatient Physical Therapy s/p L BRYAN.  Patient presents with decreased L hip ROM, strength, flexibility, endurance, functional capacity secondary to recent surgery which we will work to address through skilled therapy at this time.  R hip not doing much better here from a ROM and strength standpoint.    Currently:  Brandee has met goals with her left hip. Her primary complaint at this time is her right hip and she is contemplating having it replaced soon because her left hip did so well. She is continuing to ambulate with straight cane due to right hip. Will discharge to home exercise program with goals met.    Brandee Is progressing well towards her goals.   Pt prognosis is Good.      Pt's spiritual, cultural and educational needs considered and pt agreeable to plan of care and goals.     Anticipated barriers to physical therapy: possible need for R BRYAN down the road, 2 months out and ROM still very limited, depression    Goals:   Short Term Goals: 3 weeks   Pt will be independent with HEP for continued progression beyond skilled therapy.  Pt will be able to perform a sit to stand without increased L hip pain.  Pt will be able to amb I without pain or deviation from L hip.  Pt will be able to perform a bodyweight squat without increased L hip pain.      Long Term Goals: 5 weeks   Pt will be able to perform a single leg bridge for 20 seconds on each side without increased hip pain.  Pt will be  "able to perform a lateral step down from a 6" step without pain or limitation from L hip.  Pt will be able to negotiate stairs reciprocally without pain or limitation from L hip.  Pt will be able to return to all prior ADLs without pain or limitation from L hip.    Plan     Discharge to home exercise program.    Denia Kendall, PTA   02/12/2024    "

## 2024-02-13 ENCOUNTER — CLINICAL SUPPORT (OUTPATIENT)
Dept: REHABILITATION | Facility: HOSPITAL | Age: 74
End: 2024-02-13
Payer: MEDICARE

## 2024-02-13 DIAGNOSIS — R26.9 GAIT DIFFICULTY: ICD-10-CM

## 2024-02-13 DIAGNOSIS — Z96.642 STATUS POST TOTAL HIP REPLACEMENT, LEFT: Primary | ICD-10-CM

## 2024-02-13 PROCEDURE — 97530 THERAPEUTIC ACTIVITIES: CPT | Mod: CQ

## 2024-02-13 PROCEDURE — 97110 THERAPEUTIC EXERCISES: CPT | Mod: CQ

## 2024-04-18 DIAGNOSIS — Z96.642 STATUS POST HIP REPLACEMENT, LEFT: Primary | ICD-10-CM

## 2024-04-22 ENCOUNTER — HOSPITAL ENCOUNTER (OUTPATIENT)
Dept: RADIOLOGY | Facility: HOSPITAL | Age: 74
Discharge: HOME | End: 2024-04-22
Attending: ORTHOPAEDIC SURGERY
Payer: MEDICARE

## 2024-04-22 ENCOUNTER — OFFICE VISIT (OUTPATIENT)
Dept: ORTHOPEDICS | Facility: CLINIC | Age: 74
End: 2024-04-22
Payer: MEDICARE

## 2024-04-22 VITALS — BODY MASS INDEX: 23.05 KG/M2 | HEIGHT: 64 IN | WEIGHT: 135 LBS

## 2024-04-22 DIAGNOSIS — Z01.811 PRE-OPERATIVE RESPIRATORY EXAMINATION: ICD-10-CM

## 2024-04-22 DIAGNOSIS — Z01.812 PRE-OPERATIVE LABORATORY EXAMINATION: ICD-10-CM

## 2024-04-22 DIAGNOSIS — M16.11 ARTHRITIS OF RIGHT HIP: ICD-10-CM

## 2024-04-22 DIAGNOSIS — Z01.810 PRE-OPERATIVE CARDIOVASCULAR EXAMINATION: ICD-10-CM

## 2024-04-22 DIAGNOSIS — Z96.642 STATUS POST HIP REPLACEMENT, LEFT: Primary | ICD-10-CM

## 2024-04-22 DIAGNOSIS — Z96.642 STATUS POST HIP REPLACEMENT, LEFT: ICD-10-CM

## 2024-04-22 PROCEDURE — 1159F MED LIST DOCD IN RCRD: CPT | Mod: CPTII,,, | Performed by: ORTHOPAEDIC SURGERY

## 2024-04-22 PROCEDURE — 73502 X-RAY EXAM HIP UNI 2-3 VIEWS: CPT | Mod: 26,LT,, | Performed by: ORTHOPAEDIC SURGERY

## 2024-04-22 PROCEDURE — 73502 X-RAY EXAM HIP UNI 2-3 VIEWS: CPT | Mod: TC,LT

## 2024-04-22 PROCEDURE — 99214 OFFICE O/P EST MOD 30 MIN: CPT | Mod: S$PBB,,, | Performed by: ORTHOPAEDIC SURGERY

## 2024-04-22 PROCEDURE — 99213 OFFICE O/P EST LOW 20 MIN: CPT | Mod: PBBFAC,25 | Performed by: ORTHOPAEDIC SURGERY

## 2024-04-22 NOTE — PROGRESS NOTES
Patient is here for follow-up of her left total hip arthroplasty she is now 5 months out she is doing well with the left hip.  Her right hip she is having pain and crepitus on motion she has limited abduction and adduction of the hip less than 20° less than 20° of internal external rotation.  She does flex to 90 comes to near full extension of the right hip there is crepitus on motion tender over the greater trochanter she walks with antalgic gait she is still using a cane for the right hip her left hip is doing well we discussed risks and benefits of surgical intervention we are going to plan on doing a right total hip arthroplasty in the near future.

## 2024-04-22 NOTE — PROGRESS NOTES
Radiology Interpretation        Patient Name: Brandee Contreras  Date: 4/22/2024  YOB: 1950  MRN# [id number]        ORDERING DIAGNOSIS:    Encounter Diagnosis   Name Primary?    Status post hip replacement, left Yes      AP lateral left hip skeletally mature individual total hip arthroplasty in place no loosening fractures or subluxations no bony lesions impression total hip arthroplasty in place left hip no loosening                 He Young MD

## 2024-04-22 NOTE — PATIENT INSTRUCTIONS
Your surgery is scheduled at [facility] in [place] for 2024    Pre-Op Testin2024    ___x____ Lab (Clinic Lab 1st Floor)  ___x____ Chest X-ray ([facility] 1st Floor)  ___x____ EKG (Clinic 2nd Floor)  ___x____ Total Joint Patients Only--Cardiac/Medical Clearance appointment with Dr. Mccray on 2024 at 11:20a.m.  ___x____ (For Total Hips Only) CT Hip at [facility] on 2024 at 10:00a.m.    Our office will contact you the day before surgery to give you  the arrival time.    *Do NOT eat or drink anything after midnight the night before your surgery.    *Bring all medications in their original bottles.    *Bring anything you may need for an overnight stay.     *Bathe with Hibiclens the night or morning before surgery.    *The morning of your surgery ONLY take blood pressure medications, heart medications, medications for acid reflux, and thyroid medications (the morning dose only).  *Take these medications with a sip of water.    *Do not take Insulin or Diabetic Medications the night before or the morning of your surgery, unless directed otherwise.    *Be sure that you have stopped blood thinners at the appropriate time, as instructed.  (_____ days prior to surgery)    *Bring your C-Pap machine if you have one.    *All jewelry and piercings MUST be removed prior to surgery.    *False eye lashes must be removed prior to surgery.    *Any questions regarding co-pays or deductibles with insurance, please contact the [facility] [employer] at #[phone].    *For Financial Assistance you may call #[phone] or #[phone].    Thank you for choosing Dr. He Young for your Orthopedic needs.  We look forward to caring for you. If you have any questions, please contact our office at [phone].

## 2024-04-22 NOTE — PROGRESS NOTES
Radiology Interpretation        Patient Name: Brandee Contreras  Date: 4/22/2024  YOB: 1950  MRN# [id number]        ORDERING DIAGNOSIS:    Encounter Diagnosis   Name Primary?    Status post hip replacement, left Yes        AP pelvis skeletally mature individual there is a total hip arthroplasty in place on left no loosening no fractures or subluxations there are severe degenerative changes right hip with bone-on-bone weight-bearing portion.  Impression severe degenerative changes right hip with total hip arthroplasty in place on left no loosening               He Young MD

## 2024-06-05 ENCOUNTER — HOSPITAL ENCOUNTER (OUTPATIENT)
Dept: RADIOLOGY | Facility: HOSPITAL | Age: 74
Discharge: HOME | End: 2024-06-05
Attending: ORTHOPAEDIC SURGERY
Payer: MEDICARE

## 2024-06-05 ENCOUNTER — OFFICE VISIT (OUTPATIENT)
Dept: INTERNAL MEDICINE | Facility: CLINIC | Age: 74
End: 2024-06-05
Payer: MEDICARE

## 2024-06-05 ENCOUNTER — CLINICAL SUPPORT (OUTPATIENT)
Dept: CARDIOLOGY | Facility: CLINIC | Age: 74
End: 2024-06-05
Payer: MEDICARE

## 2024-06-05 VITALS
BODY MASS INDEX: 24.59 KG/M2 | OXYGEN SATURATION: 99 % | WEIGHT: 144 LBS | HEIGHT: 64 IN | TEMPERATURE: 98 F | SYSTOLIC BLOOD PRESSURE: 145 MMHG | DIASTOLIC BLOOD PRESSURE: 75 MMHG | HEART RATE: 77 BPM

## 2024-06-05 DIAGNOSIS — Z01.810 PRE-OPERATIVE CARDIOVASCULAR EXAMINATION: ICD-10-CM

## 2024-06-05 DIAGNOSIS — Z01.818 PREOP EXAMINATION: Primary | ICD-10-CM

## 2024-06-05 DIAGNOSIS — Z01.811 PRE-OPERATIVE RESPIRATORY EXAMINATION: ICD-10-CM

## 2024-06-05 DIAGNOSIS — M16.11 ARTHRITIS OF RIGHT HIP: ICD-10-CM

## 2024-06-05 DIAGNOSIS — M16.0 ARTHRITIS OF BOTH HIPS: ICD-10-CM

## 2024-06-05 DIAGNOSIS — I10 HYPERTENSION, UNSPECIFIED TYPE: ICD-10-CM

## 2024-06-05 DIAGNOSIS — M81.0 OSTEOPOROSIS, UNSPECIFIED OSTEOPOROSIS TYPE, UNSPECIFIED PATHOLOGICAL FRACTURE PRESENCE: ICD-10-CM

## 2024-06-05 LAB
OHS QRS DURATION: 72 MS
OHS QTC CALCULATION: 414 MS

## 2024-06-05 PROCEDURE — 3077F SYST BP >= 140 MM HG: CPT | Mod: CPTII,,, | Performed by: INTERNAL MEDICINE

## 2024-06-05 PROCEDURE — 73700 CT LOWER EXTREMITY W/O DYE: CPT | Mod: TC,RT

## 2024-06-05 PROCEDURE — 99212 OFFICE O/P EST SF 10 MIN: CPT | Mod: PBBFAC,25

## 2024-06-05 PROCEDURE — 1101F PT FALLS ASSESS-DOCD LE1/YR: CPT | Mod: CPTII,,, | Performed by: INTERNAL MEDICINE

## 2024-06-05 PROCEDURE — 71046 X-RAY EXAM CHEST 2 VIEWS: CPT | Mod: 26,,, | Performed by: RADIOLOGY

## 2024-06-05 PROCEDURE — 99214 OFFICE O/P EST MOD 30 MIN: CPT | Mod: PBBFAC,25 | Performed by: INTERNAL MEDICINE

## 2024-06-05 PROCEDURE — 1125F AMNT PAIN NOTED PAIN PRSNT: CPT | Mod: CPTII,,, | Performed by: INTERNAL MEDICINE

## 2024-06-05 PROCEDURE — 1159F MED LIST DOCD IN RCRD: CPT | Mod: CPTII,,, | Performed by: INTERNAL MEDICINE

## 2024-06-05 PROCEDURE — 73700 CT LOWER EXTREMITY W/O DYE: CPT | Mod: 26,RT,, | Performed by: RADIOLOGY

## 2024-06-05 PROCEDURE — 71046 X-RAY EXAM CHEST 2 VIEWS: CPT | Mod: TC

## 2024-06-05 PROCEDURE — 3078F DIAST BP <80 MM HG: CPT | Mod: CPTII,,, | Performed by: INTERNAL MEDICINE

## 2024-06-05 PROCEDURE — 93010 ELECTROCARDIOGRAM REPORT: CPT | Mod: S$PBB,,, | Performed by: STUDENT IN AN ORGANIZED HEALTH CARE EDUCATION/TRAINING PROGRAM

## 2024-06-05 PROCEDURE — 3288F FALL RISK ASSESSMENT DOCD: CPT | Mod: CPTII,,, | Performed by: INTERNAL MEDICINE

## 2024-06-05 PROCEDURE — 99214 OFFICE O/P EST MOD 30 MIN: CPT | Mod: S$PBB,,, | Performed by: INTERNAL MEDICINE

## 2024-06-05 PROCEDURE — 93005 ELECTROCARDIOGRAM TRACING: CPT | Mod: PBBFAC | Performed by: STUDENT IN AN ORGANIZED HEALTH CARE EDUCATION/TRAINING PROGRAM

## 2024-06-05 RX ORDER — AMLODIPINE BESYLATE 5 MG/1
5 TABLET ORAL DAILY
Qty: 90 TABLET | Refills: 1 | Status: SHIPPED | OUTPATIENT
Start: 2024-06-05

## 2024-06-05 RX ORDER — CHLORTHALIDONE 25 MG/1
25 TABLET ORAL DAILY
Qty: 90 TABLET | Refills: 1 | Status: SHIPPED | OUTPATIENT
Start: 2024-06-05

## 2024-06-05 NOTE — PROGRESS NOTES
Subjective     Patient ID: Brandee Contreras is a 73 y.o. female.    Chief Complaint: Documentation Only (Right hip replacement/)    The patient is a 73-year-old female the presents today for surgical preop risk stratification.  She is scheduled to have a right total hip replacement  with Dr. Young.  She had her right hip replaced back on November 2023 without any complications.  No significant changes in her health since we last saw her .  She denies any issues with anesthesia in the past.  She has a past medical history of hypertension, osteoporosis, and arthritis.  She continues to occasionally smokes cigarettes.  She states that a pack will last her a week to 2 weeks.  No known history of coronary artery disease, cerebrovascular disease, CHF, or valvular heart disease.  She denies any chest pain at rest or with moderate exertion.  No family history of premature coronary artery disease.  She is resting comfortably today in no distress.  She is afebrile and vital signs are stable.      Review of Systems   Constitutional:  Negative for appetite change, chills, fatigue and fever.   HENT:  Negative for nasal congestion, ear pain, hearing loss, sinus pressure/congestion and sore throat.    Eyes:  Negative for pain, redness and visual disturbance.   Respiratory:  Negative for apnea, cough, shortness of breath and wheezing.    Cardiovascular:  Negative for chest pain and palpitations.   Gastrointestinal:  Negative for abdominal pain, constipation, diarrhea and nausea.   Endocrine: Negative for cold intolerance, heat intolerance and polyuria.   Genitourinary:  Negative for dysuria and hematuria.   Musculoskeletal:  Positive for arthralgias. Negative for back pain, joint swelling, myalgias and neck pain.   Integumentary:  Negative for pallor, rash and wound.   Allergic/Immunologic: Negative for immunocompromised state.   Neurological:  Negative for tremors, seizures, weakness, headaches and memory loss.   Hematological:   Negative for adenopathy.   Psychiatric/Behavioral:  Negative for confusion, dysphoric mood and sleep disturbance. The patient is not nervous/anxious.           Objective     Physical Exam  Vitals and nursing note reviewed.   Constitutional:       General: She is not in acute distress.     Appearance: Normal appearance. She is not ill-appearing.   HENT:      Head: Normocephalic and atraumatic.      Right Ear: External ear normal.      Left Ear: External ear normal.      Nose: Nose normal.      Mouth/Throat:      Pharynx: Oropharynx is clear.   Eyes:      Extraocular Movements: Extraocular movements intact.      Conjunctiva/sclera: Conjunctivae normal.      Pupils: Pupils are equal, round, and reactive to light.   Neck:      Vascular: No carotid bruit.   Cardiovascular:      Rate and Rhythm: Normal rate and regular rhythm.      Pulses: Normal pulses.      Heart sounds: Normal heart sounds. No murmur heard.  Pulmonary:      Effort: No respiratory distress.      Breath sounds: Normal breath sounds. No wheezing or rales.   Abdominal:      General: Bowel sounds are normal.      Palpations: Abdomen is soft.   Musculoskeletal:      Cervical back: Normal range of motion and neck supple.      Right lower leg: No edema.      Left lower leg: No edema.   Skin:     General: Skin is warm and dry.      Capillary Refill: Capillary refill takes less than 2 seconds.      Coloration: Skin is not pale.   Neurological:      General: No focal deficit present.      Mental Status: She is alert and oriented to person, place, and time.      Cranial Nerves: No cranial nerve deficit.      Sensory: No sensory deficit.      Motor: No weakness.   Psychiatric:         Mood and Affect: Mood normal.         Judgment: Judgment normal.            Assessment and Plan     1. Preop examination    2. Hypertension, unspecified type  -     amLODIPine (NORVASC) 5 MG tablet; Take 1 tablet (5 mg total) by mouth once daily.  Dispense: 90 tablet; Refill: 1  -      chlorthalidone (HYGROTEN) 25 MG Tab; Take 1 tablet (25 mg total) by mouth once daily.  Dispense: 90 tablet; Refill: 1    3. Arthritis of both hips    4. Osteoporosis, unspecified osteoporosis type, unspecified pathological fracture presence        1. Right hip hip osteoarthritis- she had her left hip replaced back in November 2023 without any complications Patient presents today for surgical preop risk stratification.  No known history of coronary artery disease, cerebrovascular disease, CHF, or valvular heart disease.  She is able to perform greater than 4 metabolic equivalents without any chest pain or shortness of breath.  We have reviewed available lab work and imaging.  Her revised cardiac risk index is class 1.  ACS-SRC risk for serious complication is 5.2% and any complication is 6.3 %.  This is lower than the average risk for someone her age.  Overall she is considered low risk for this procedure.  Okay to proceed to surgery without any further testing    2. Essential hypertension-continue home medications perioperatively.  She has been out of her blood pressure medication for about a week.  I have refilled these for her.  Blood pressure today 145/75    3. Osteoporosis-she takes Boniva    4. Tobacco dependence-counseled on the importance of smoking cessation.  She voices understanding and is in planning stage.  O2 sat 99% on room air.      Billing for this encounter based on moderate level of medical decision-making         No follow-ups on file.

## 2024-06-07 DIAGNOSIS — N39.0 URINARY TRACT INFECTION WITHOUT HEMATURIA, SITE UNSPECIFIED: Primary | ICD-10-CM

## 2024-06-07 DIAGNOSIS — Z01.812 PRE-OPERATIVE LABORATORY EXAMINATION: ICD-10-CM

## 2024-06-07 RX ORDER — SULFAMETHOXAZOLE AND TRIMETHOPRIM 800; 160 MG/1; MG/1
1 TABLET ORAL 2 TIMES DAILY
Qty: 10 TABLET | Refills: 0 | Status: ON HOLD | OUTPATIENT
Start: 2024-06-07 | End: 2024-06-19 | Stop reason: HOSPADM

## 2024-06-07 NOTE — TELEPHONE ENCOUNTER
----- Message from He Young MD sent at 6/7/2024  8:55 AM CDT -----  Patient positive for UTI.  Treat with Bactrim for 5 days.

## 2024-06-07 NOTE — TELEPHONE ENCOUNTER
Called and notified patient that her U/A shows that she has an UTI and Dr. Young recommends Bactrim for 5 days and repeat her U/A. Patient agrees and said to send medicine to [facility].

## 2024-06-17 NOTE — SUBJECTIVE & OBJECTIVE
Past Medical History:   Diagnosis Date    Arthritis     Hypertension     Nicotine dependence        Past Surgical History:   Procedure Laterality Date    JOINT REPLACEMENT Left 11/14/2023    LTHR     Dr. He Young    ROBOTIC ARTHROPLASTY, HIP Left 11/14/2023    Procedure: ROBOTIC ARTHROPLASTY,HIP;  Surgeon: He Young MD;  Location: [facility];  Service: Orthopedics;  Laterality: Left;    VAGINAL DELIVERY         Review of patient's allergies indicates:  No Known Allergies    No current facility-administered medications for this encounter.     Current Outpatient Medications   Medication Sig    amLODIPine (NORVASC) 5 MG tablet Take 1 tablet (5 mg total) by mouth once daily.    celecoxib (CELEBREX) 200 MG capsule TAKE ONE (1) CAPSULE BY MOUTH EVERY DAY  WITH FOOD (Patient not taking: Reported on 6/5/2024)    chlorthalidone (HYGROTEN) 25 MG Tab Take 1 tablet (25 mg total) by mouth once daily.    HYDROcodone-acetaminophen (NORCO)  mg per tablet Take 1 tablet by mouth every 6 (six) hours as needed for Pain. (Patient not taking: Reported on 6/5/2024)    HYDROcodone-acetaminophen (NORCO)  mg per tablet Take 1 tablet by mouth every 6 (six) hours as needed for Pain. (Patient not taking: Reported on 6/5/2024)    HYDROcodone-acetaminophen (NORCO)  mg per tablet Take 1 tablet by mouth every 6 (six) hours as needed for Pain. (Patient not taking: Reported on 6/5/2024)    ibandronate (BONIVA) 150 mg tablet Take 1 tablet (150 mg total) by mouth every 30 days. (Patient not taking: Reported on 6/5/2024)    sulfamethoxazole-trimethoprim 800-160mg (BACTRIM DS) 800-160 mg Tab Take 1 tablet by mouth 2 (two) times daily. (Patient not taking: Reported on 11/13/2023)    sulfamethoxazole-trimethoprim 800-160mg (BACTRIM DS) 800-160 mg Tab Take 1 tablet by mouth 2 (two) times daily.     Family History       Problem Relation (Age of Onset)    Hypertension Maternal Grandmother          Tobacco Use    Smoking  status: Former     Current packs/day: 0.25     Average packs/day: 0.3 packs/day for 3.4 years (0.8 ttl pk-yrs)     Types: Cigarettes     Start date: 02/2021     Passive exposure: Current    Smokeless tobacco: Never   Substance and Sexual Activity    Alcohol use: Never    Drug use: Never    Sexual activity: Yes     Review of Systems   Constitutional: Negative for decreased appetite.   HENT:  Negative for congestion and stridor.    Respiratory:  Negative for cough and wheezing.    Endocrine: Negative for cold intolerance.   Hematologic/Lymphatic: Negative for adenopathy.   Skin:  Negative for color change and suspicious lesions.   Musculoskeletal:  Positive for joint pain. Negative for muscle cramps and neck pain.   Gastrointestinal:  Negative for abdominal pain and hematemesis.   Genitourinary:  Negative for dysuria and hematuria.   Neurological:  Negative for brief paralysis, focal weakness and weakness.   Psychiatric/Behavioral:  Negative for altered mental status and suicidal ideas.    Allergic/Immunologic: Negative for hives.     Objective:     Vital Signs (Most Recent):    Vital Signs (24h Range):              There is no height or weight on file to calculate BMI.    No intake or output data in the 24 hours ending 06/17/24 1225         General Musculoskeletal Exam   Gait: antalgic         Right Hip Exam     Tenderness   The patient tender to palpation of the trochanteric bursa.    Crepitus   The patient has crepitus of the trochanteric bursa.    Other   Sensation: normal      Vascular Exam     Right Pulses  Dorsalis Pedis:      2+             Significant Labs: All pertinent labs within the past 24 hours have been reviewed.    Significant Imaging: I have reviewed and interpreted all pertinent imaging results/findings.

## 2024-06-17 NOTE — HPI
73-year-old female with severe degenerative joint disease right hip.  She has undergone a previous left total hip arthroplasty without incident.  She is walking with antalgic gait having to use a cane for ambulation she is at risk for falls on right hip pain is worse with weight-bearing she is now wishing total hip arthroplasty on the right  Right lower extremity she moves her toes has sensation to touch has palpable pulses.  She is tender palpation of the greater trochanter.  Pain and crepitus on range motion.  She comes to full extension on the right hip flexes to 90 less than 15° of abduction adduction internal or external rotation of the right hip  X-rays show severe degenerative joint disease right hip  Impression severe degenerative joint disease right hip  Plan total hip arthroplasty on the right with MAKOplasty

## 2024-06-17 NOTE — H&P
[facility]  Orthopedics  H&P    Patient Name: Brandee Contreras  MRN: [id number]  Admission Date: (Not on file)  Primary Care Provider: Sonia Morris NP    Patient information was obtained from patient and past medical records.     Subjective:     Principal Problem:<principal problem not specified>    Chief Complaint: No chief complaint on file.       HPI: 73-year-old female with severe degenerative joint disease right hip.  She has undergone a previous left total hip arthroplasty without incident.  She is walking with antalgic gait having to use a cane for ambulation she is at risk for falls on right hip pain is worse with weight-bearing she is now wishing total hip arthroplasty on the right  Right lower extremity she moves her toes has sensation to touch has palpable pulses.  She is tender palpation of the greater trochanter.  Pain and crepitus on range motion.  She comes to full extension on the right hip flexes to 90 less than 15° of abduction adduction internal or external rotation of the right hip  X-rays show severe degenerative joint disease right hip  Impression severe degenerative joint disease right hip  Plan total hip arthroplasty on the right    Past Medical History:   Diagnosis Date    Arthritis     Hypertension     Nicotine dependence        Past Surgical History:   Procedure Laterality Date    JOINT REPLACEMENT Left 11/14/2023    LT     Dr. He Young    ROBOTIC ARTHROPLASTY, HIP Left 11/14/2023    Procedure: ROBOTIC ARTHROPLASTY,HIP;  Surgeon: He Young MD;  Location: [facility];  Service: Orthopedics;  Laterality: Left;    VAGINAL DELIVERY         Review of patient's allergies indicates:  No Known Allergies    No current facility-administered medications for this encounter.     Current Outpatient Medications   Medication Sig    amLODIPine (NORVASC) 5 MG tablet Take 1 tablet (5 mg total) by mouth once daily.    celecoxib (CELEBREX) 200 MG  capsule TAKE ONE (1) CAPSULE BY MOUTH EVERY DAY  WITH FOOD (Patient not taking: Reported on 6/5/2024)    chlorthalidone (HYGROTEN) 25 MG Tab Take 1 tablet (25 mg total) by mouth once daily.    HYDROcodone-acetaminophen (NORCO)  mg per tablet Take 1 tablet by mouth every 6 (six) hours as needed for Pain. (Patient not taking: Reported on 6/5/2024)    HYDROcodone-acetaminophen (NORCO)  mg per tablet Take 1 tablet by mouth every 6 (six) hours as needed for Pain. (Patient not taking: Reported on 6/5/2024)    HYDROcodone-acetaminophen (NORCO)  mg per tablet Take 1 tablet by mouth every 6 (six) hours as needed for Pain. (Patient not taking: Reported on 6/5/2024)    ibandronate (BONIVA) 150 mg tablet Take 1 tablet (150 mg total) by mouth every 30 days. (Patient not taking: Reported on 6/5/2024)    sulfamethoxazole-trimethoprim 800-160mg (BACTRIM DS) 800-160 mg Tab Take 1 tablet by mouth 2 (two) times daily. (Patient not taking: Reported on 11/13/2023)    sulfamethoxazole-trimethoprim 800-160mg (BACTRIM DS) 800-160 mg Tab Take 1 tablet by mouth 2 (two) times daily.     Family History       Problem Relation (Age of Onset)    Hypertension Maternal Grandmother          Tobacco Use    Smoking status: Former     Current packs/day: 0.25     Average packs/day: 0.3 packs/day for 3.4 years (0.8 ttl pk-yrs)     Types: Cigarettes     Start date: 02/2021     Passive exposure: Current    Smokeless tobacco: Never   Substance and Sexual Activity    Alcohol use: Never    Drug use: Never    Sexual activity: Yes     Review of Systems   Constitutional: Negative for decreased appetite.   HENT:  Negative for congestion and stridor.    Respiratory:  Negative for cough and wheezing.    Endocrine: Negative for cold intolerance.   Hematologic/Lymphatic: Negative for adenopathy.   Skin:  Negative for color change and suspicious lesions.   Musculoskeletal:  Positive for joint pain. Negative for muscle cramps and neck pain.    Gastrointestinal:  Negative for abdominal pain and hematemesis.   Genitourinary:  Negative for dysuria and hematuria.   Neurological:  Negative for brief paralysis, focal weakness and weakness.   Psychiatric/Behavioral:  Negative for altered mental status and suicidal ideas.    Allergic/Immunologic: Negative for hives.     Objective:     Vital Signs (Most Recent):    Vital Signs (24h Range):              There is no height or weight on file to calculate BMI.    No intake or output data in the 24 hours ending 06/17/24 1225         General Musculoskeletal Exam   Gait: antalgic         Right Hip Exam     Tenderness   The patient tender to palpation of the trochanteric bursa.    Crepitus   The patient has crepitus of the trochanteric bursa.    Other   Sensation: normal      Vascular Exam     Right Pulses  Dorsalis Pedis:      2+             Significant Labs: All pertinent labs within the past 24 hours have been reviewed.    Significant Imaging: I have reviewed and interpreted all pertinent imaging results/findings.  Assessment/Plan:     No notes have been filed under this hospital service.  Service: Orthopedic Surgery      He Young MD  Orthopedics  [facility]

## 2024-06-17 NOTE — H&P
[facility]  Orthopedics  H&P    Patient Name: Brandee Contreras  MRN: [id number]  Admission Date: (Not on file)  Primary Care Provider: Sonia Morris NP    Patient information was obtained from patient and past medical records.     Subjective:     Principal Problem:<principal problem not specified>    Chief Complaint: No chief complaint on file.       HPI: 73-year-old female with severe degenerative joint disease right hip.  She has undergone a previous left total hip arthroplasty without incident.  She is walking with antalgic gait having to use a cane for ambulation she is at risk for falls on right hip pain is worse with weight-bearing she is now wishing total hip arthroplasty on the right  Right lower extremity she moves her toes has sensation to touch has palpable pulses.  She is tender palpation of the greater trochanter.  Pain and crepitus on range motion.  She comes to full extension on the right hip flexes to 90 less than 15° of abduction adduction internal or external rotation of the right hip  X-rays show severe degenerative joint disease right hip  Impression severe degenerative joint disease right hip  Plan total hip arthroplasty on the right with MAKOplasty    No new subjective & objective note has been filed under this hospital service since the last note was generated.    Assessment/Plan:     No notes have been filed under this hospital service.  Service: Orthopedic Surgery      He Young MD  Orthopedics  [facility]

## 2024-06-18 ENCOUNTER — HOSPITAL ENCOUNTER (OUTPATIENT)
Facility: HOSPITAL | Age: 74
Discharge: HOME HEALTH | End: 2024-06-19
Attending: ORTHOPAEDIC SURGERY | Admitting: ORTHOPAEDIC SURGERY
Payer: MEDICARE

## 2024-06-18 ENCOUNTER — ANESTHESIA (OUTPATIENT)
Dept: SURGERY | Facility: HOSPITAL | Age: 74
End: 2024-06-18
Payer: MEDICARE

## 2024-06-18 ENCOUNTER — ANESTHESIA EVENT (OUTPATIENT)
Dept: SURGERY | Facility: HOSPITAL | Age: 74
End: 2024-06-18
Payer: MEDICARE

## 2024-06-18 DIAGNOSIS — M16.0 ARTHRITIS OF BOTH HIPS: ICD-10-CM

## 2024-06-18 DIAGNOSIS — M16.11 ARTHRITIS OF RIGHT HIP: ICD-10-CM

## 2024-06-18 LAB
ANION GAP SERPL CALCULATED.3IONS-SCNC: 10 MMOL/L (ref 7–16)
BASOPHILS # BLD AUTO: 0.03 K/UL (ref 0–0.2)
BASOPHILS NFR BLD AUTO: 0.3 % (ref 0–1)
BUN SERPL-MCNC: 10 MG/DL (ref 7–18)
BUN/CREAT SERPL: 15 (ref 6–20)
CALCIUM SERPL-MCNC: 7.5 MG/DL (ref 8.5–10.1)
CHLORIDE SERPL-SCNC: 109 MMOL/L (ref 98–107)
CO2 SERPL-SCNC: 26 MMOL/L (ref 21–32)
CREAT SERPL-MCNC: 0.65 MG/DL (ref 0.55–1.02)
DIFFERENTIAL METHOD BLD: ABNORMAL
EGFR (NO RACE VARIABLE) (RUSH/TITUS): 92 ML/MIN/1.73M2
EOSINOPHIL # BLD AUTO: 0.03 K/UL (ref 0–0.5)
EOSINOPHIL NFR BLD AUTO: 0.3 % (ref 1–4)
ERYTHROCYTE [DISTWIDTH] IN BLOOD BY AUTOMATED COUNT: 13.1 % (ref 11.5–14.5)
GLUCOSE SERPL-MCNC: 130 MG/DL (ref 74–106)
HCT VFR BLD AUTO: 32.4 % (ref 38–47)
HGB BLD-MCNC: 10.4 G/DL (ref 12–16)
IMM GRANULOCYTES # BLD AUTO: 0.05 K/UL (ref 0–0.04)
IMM GRANULOCYTES NFR BLD: 0.6 % (ref 0–0.4)
INDIRECT COOMBS: NORMAL
LYMPHOCYTES # BLD AUTO: 1.22 K/UL (ref 1–4.8)
LYMPHOCYTES NFR BLD AUTO: 13.8 % (ref 27–41)
MCH RBC QN AUTO: 30.1 PG (ref 27–31)
MCHC RBC AUTO-ENTMCNC: 32.1 G/DL (ref 32–36)
MCV RBC AUTO: 93.9 FL (ref 80–96)
MONOCYTES # BLD AUTO: 0.17 K/UL (ref 0–0.8)
MONOCYTES NFR BLD AUTO: 1.9 % (ref 2–6)
MPC BLD CALC-MCNC: 10.6 FL (ref 9.4–12.4)
NEUTROPHILS # BLD AUTO: 7.37 K/UL (ref 1.8–7.7)
NEUTROPHILS NFR BLD AUTO: 83.1 % (ref 53–65)
NRBC # BLD AUTO: 0 X10E3/UL
NRBC, AUTO (.00): 0 %
PLATELET # BLD AUTO: 197 K/UL (ref 150–400)
POTASSIUM SERPL-SCNC: 4.2 MMOL/L (ref 3.5–5.1)
RBC # BLD AUTO: 3.45 M/UL (ref 4.2–5.4)
RH BLD: NORMAL
SODIUM SERPL-SCNC: 141 MMOL/L (ref 136–145)
SPECIMEN OUTDATE: NORMAL
WBC # BLD AUTO: 8.87 K/UL (ref 4.5–11)

## 2024-06-18 PROCEDURE — 63600175 PHARM REV CODE 636 W HCPCS: Performed by: ANESTHESIOLOGY

## 2024-06-18 PROCEDURE — 88311 DECALCIFY TISSUE: CPT | Mod: TC,SUR | Performed by: ORTHOPAEDIC SURGERY

## 2024-06-18 PROCEDURE — 99214 OFFICE O/P EST MOD 30 MIN: CPT | Mod: ,,, | Performed by: FAMILY MEDICINE

## 2024-06-18 PROCEDURE — 0055T BONE SRGRY CMPTR CT/MRI IMAG: CPT | Mod: ,,, | Performed by: ORTHOPAEDIC SURGERY

## 2024-06-18 PROCEDURE — 63600175 PHARM REV CODE 636 W HCPCS: Performed by: ORTHOPAEDIC SURGERY

## 2024-06-18 PROCEDURE — C1776 JOINT DEVICE (IMPLANTABLE): HCPCS | Performed by: ORTHOPAEDIC SURGERY

## 2024-06-18 PROCEDURE — 27000165 HC TUBE, ETT CUFFED: Performed by: ANESTHESIOLOGY

## 2024-06-18 PROCEDURE — 36415 COLL VENOUS BLD VENIPUNCTURE: CPT | Mod: 91 | Performed by: ORTHOPAEDIC SURGERY

## 2024-06-18 PROCEDURE — 37000008 HC ANESTHESIA 1ST 15 MINUTES: Performed by: ORTHOPAEDIC SURGERY

## 2024-06-18 PROCEDURE — 99900031 HC PATIENT EDUCATION (STAT)

## 2024-06-18 PROCEDURE — 85025 COMPLETE CBC W/AUTO DIFF WBC: CPT | Performed by: ORTHOPAEDIC SURGERY

## 2024-06-18 PROCEDURE — 63600175 PHARM REV CODE 636 W HCPCS: Performed by: NURSE ANESTHETIST, CERTIFIED REGISTERED

## 2024-06-18 PROCEDURE — 86901 BLOOD TYPING SEROLOGIC RH(D): CPT | Performed by: ORTHOPAEDIC SURGERY

## 2024-06-18 PROCEDURE — 86900 BLOOD TYPING SEROLOGIC ABO: CPT | Performed by: ORTHOPAEDIC SURGERY

## 2024-06-18 PROCEDURE — 36000712 HC OR TIME LEV V 1ST 15 MIN: Performed by: ORTHOPAEDIC SURGERY

## 2024-06-18 PROCEDURE — 71000016 HC POSTOP RECOV ADDL HR: Performed by: ORTHOPAEDIC SURGERY

## 2024-06-18 PROCEDURE — 27000284 HC CANNULA NASAL: Performed by: ANESTHESIOLOGY

## 2024-06-18 PROCEDURE — 27000655: Performed by: ANESTHESIOLOGY

## 2024-06-18 PROCEDURE — 27130 TOTAL HIP ARTHROPLASTY: CPT | Mod: RT,,, | Performed by: ORTHOPAEDIC SURGERY

## 2024-06-18 PROCEDURE — 27201423 OPTIME MED/SURG SUP & DEVICES STERILE SUPPLY: Performed by: ORTHOPAEDIC SURGERY

## 2024-06-18 PROCEDURE — 27000689 HC BLADE LARYNGOSCOPE ANY SIZE: Performed by: ANESTHESIOLOGY

## 2024-06-18 PROCEDURE — C1713 ANCHOR/SCREW BN/BN,TIS/BN: HCPCS | Performed by: ORTHOPAEDIC SURGERY

## 2024-06-18 PROCEDURE — 25000003 PHARM REV CODE 250: Performed by: ORTHOPAEDIC SURGERY

## 2024-06-18 PROCEDURE — 80048 BASIC METABOLIC PNL TOTAL CA: CPT | Performed by: ORTHOPAEDIC SURGERY

## 2024-06-18 PROCEDURE — 36000713 HC OR TIME LEV V EA ADD 15 MIN: Performed by: ORTHOPAEDIC SURGERY

## 2024-06-18 PROCEDURE — 37000009 HC ANESTHESIA EA ADD 15 MINS: Performed by: ORTHOPAEDIC SURGERY

## 2024-06-18 PROCEDURE — 27000510 HC BLANKET BAIR HUGGER ANY SIZE: Performed by: ANESTHESIOLOGY

## 2024-06-18 PROCEDURE — 25000003 PHARM REV CODE 250: Performed by: NURSE ANESTHETIST, CERTIFIED REGISTERED

## 2024-06-18 PROCEDURE — 36415 COLL VENOUS BLD VENIPUNCTURE: CPT | Performed by: ORTHOPAEDIC SURGERY

## 2024-06-18 PROCEDURE — 71000033 HC RECOVERY, INTIAL HOUR: Performed by: ORTHOPAEDIC SURGERY

## 2024-06-18 PROCEDURE — 71000015 HC POSTOP RECOV 1ST HR: Performed by: ORTHOPAEDIC SURGERY

## 2024-06-18 PROCEDURE — 27000716 HC OXISENSOR PROBE, ANY SIZE: Performed by: ANESTHESIOLOGY

## 2024-06-18 PROCEDURE — 88304 TISSUE EXAM BY PATHOLOGIST: CPT | Mod: TC,SUR | Performed by: ORTHOPAEDIC SURGERY

## 2024-06-18 PROCEDURE — 27000758 HC SPIROMETER

## 2024-06-18 PROCEDURE — 94761 N-INVAS EAR/PLS OXIMETRY MLT: CPT

## 2024-06-18 DEVICE — TRIDENT X3 10 DEGREE POLYETHYLENE INSERT
Type: IMPLANTABLE DEVICE | Site: HIP | Status: FUNCTIONAL
Brand: TRIDENT X3 INSERT

## 2024-06-18 DEVICE — TRIDENT II TRITANIUM CLUSTER 50D
Type: IMPLANTABLE DEVICE | Site: HIP | Status: FUNCTIONAL
Brand: TRIDENT II

## 2024-06-18 DEVICE — LFIT V40 FEMORAL HEAD
Type: IMPLANTABLE DEVICE | Site: HIP | Status: FUNCTIONAL
Brand: V40 HEAD

## 2024-06-18 DEVICE — 127 DEGREE NECK ANGLE HIP STEM
Type: IMPLANTABLE DEVICE | Site: HIP | Status: FUNCTIONAL
Brand: ACCOLADE

## 2024-06-18 DEVICE — 6.5MM LOW PROFILE HEX SCREW 20MM
Type: IMPLANTABLE DEVICE | Site: HIP | Status: FUNCTIONAL
Brand: TRIDENT II

## 2024-06-18 RX ORDER — HYDROCODONE BITARTRATE AND ACETAMINOPHEN 5; 325 MG/1; MG/1
1 TABLET ORAL EVERY 4 HOURS PRN
Status: DISCONTINUED | OUTPATIENT
Start: 2024-06-18 | End: 2024-06-19 | Stop reason: HOSPADM

## 2024-06-18 RX ORDER — HYDROMORPHONE HYDROCHLORIDE 2 MG/ML
0.5 INJECTION, SOLUTION INTRAMUSCULAR; INTRAVENOUS; SUBCUTANEOUS EVERY 5 MIN PRN
Status: DISCONTINUED | OUTPATIENT
Start: 2024-06-18 | End: 2024-06-18 | Stop reason: HOSPADM

## 2024-06-18 RX ORDER — ONDANSETRON HYDROCHLORIDE 2 MG/ML
INJECTION, SOLUTION INTRAVENOUS
Status: DISCONTINUED | OUTPATIENT
Start: 2024-06-18 | End: 2024-06-18

## 2024-06-18 RX ORDER — CHLORTHALIDONE 25 MG/1
25 TABLET ORAL DAILY
Status: DISCONTINUED | OUTPATIENT
Start: 2024-06-18 | End: 2024-06-19 | Stop reason: HOSPADM

## 2024-06-18 RX ORDER — DIPHENHYDRAMINE HYDROCHLORIDE 50 MG/ML
25 INJECTION INTRAMUSCULAR; INTRAVENOUS EVERY 6 HOURS PRN
Status: DISCONTINUED | OUTPATIENT
Start: 2024-06-18 | End: 2024-06-18 | Stop reason: HOSPADM

## 2024-06-18 RX ORDER — MEPERIDINE HYDROCHLORIDE 25 MG/ML
25 INJECTION INTRAMUSCULAR; INTRAVENOUS; SUBCUTANEOUS EVERY 10 MIN PRN
Status: DISCONTINUED | OUTPATIENT
Start: 2024-06-18 | End: 2024-06-18 | Stop reason: HOSPADM

## 2024-06-18 RX ORDER — IPRATROPIUM BROMIDE AND ALBUTEROL SULFATE 2.5; .5 MG/3ML; MG/3ML
3 SOLUTION RESPIRATORY (INHALATION) ONCE
Status: DISCONTINUED | OUTPATIENT
Start: 2024-06-18 | End: 2024-06-18 | Stop reason: HOSPADM

## 2024-06-18 RX ORDER — PROPOFOL 10 MG/ML
VIAL (ML) INTRAVENOUS
Status: DISCONTINUED | OUTPATIENT
Start: 2024-06-18 | End: 2024-06-18

## 2024-06-18 RX ORDER — FENTANYL CITRATE 50 UG/ML
INJECTION, SOLUTION INTRAMUSCULAR; INTRAVENOUS
Status: DISCONTINUED | OUTPATIENT
Start: 2024-06-18 | End: 2024-06-18

## 2024-06-18 RX ORDER — SODIUM CHLORIDE 9 MG/ML
INJECTION, SOLUTION INTRAVENOUS CONTINUOUS
Status: DISCONTINUED | OUTPATIENT
Start: 2024-06-18 | End: 2024-06-18

## 2024-06-18 RX ORDER — MIDAZOLAM HYDROCHLORIDE 1 MG/ML
INJECTION INTRAMUSCULAR; INTRAVENOUS
Status: DISCONTINUED | OUTPATIENT
Start: 2024-06-18 | End: 2024-06-18

## 2024-06-18 RX ORDER — ROCURONIUM BROMIDE 10 MG/ML
INJECTION, SOLUTION INTRAVENOUS
Status: DISCONTINUED | OUTPATIENT
Start: 2024-06-18 | End: 2024-06-18

## 2024-06-18 RX ORDER — ONDANSETRON HYDROCHLORIDE 2 MG/ML
4 INJECTION, SOLUTION INTRAVENOUS DAILY PRN
Status: DISCONTINUED | OUTPATIENT
Start: 2024-06-18 | End: 2024-06-18 | Stop reason: HOSPADM

## 2024-06-18 RX ORDER — MORPHINE SULFATE 10 MG/ML
4 INJECTION INTRAMUSCULAR; INTRAVENOUS; SUBCUTANEOUS EVERY 5 MIN PRN
Status: DISCONTINUED | OUTPATIENT
Start: 2024-06-18 | End: 2024-06-18 | Stop reason: HOSPADM

## 2024-06-18 RX ORDER — BISACODYL 10 MG/1
10 SUPPOSITORY RECTAL DAILY PRN
Status: DISCONTINUED | OUTPATIENT
Start: 2024-06-18 | End: 2024-06-19 | Stop reason: HOSPADM

## 2024-06-18 RX ORDER — ACETAMINOPHEN 10 MG/ML
1000 INJECTION, SOLUTION INTRAVENOUS ONCE
Status: COMPLETED | OUTPATIENT
Start: 2024-06-18 | End: 2024-06-18

## 2024-06-18 RX ORDER — PHENYLEPHRINE HYDROCHLORIDE 10 MG/ML
INJECTION INTRAVENOUS
Status: DISCONTINUED | OUTPATIENT
Start: 2024-06-18 | End: 2024-06-18

## 2024-06-18 RX ORDER — MUPIROCIN 20 MG/G
1 OINTMENT TOPICAL 2 TIMES DAILY
Status: DISCONTINUED | OUTPATIENT
Start: 2024-06-18 | End: 2024-06-19 | Stop reason: HOSPADM

## 2024-06-18 RX ORDER — DEXAMETHASONE SODIUM PHOSPHATE 4 MG/ML
INJECTION, SOLUTION INTRA-ARTICULAR; INTRALESIONAL; INTRAMUSCULAR; INTRAVENOUS; SOFT TISSUE
Status: DISCONTINUED | OUTPATIENT
Start: 2024-06-18 | End: 2024-06-18

## 2024-06-18 RX ORDER — ONDANSETRON HYDROCHLORIDE 2 MG/ML
4 INJECTION, SOLUTION INTRAVENOUS EVERY 8 HOURS PRN
Status: DISCONTINUED | OUTPATIENT
Start: 2024-06-18 | End: 2024-06-19 | Stop reason: HOSPADM

## 2024-06-18 RX ORDER — AMLODIPINE BESYLATE 5 MG/1
5 TABLET ORAL DAILY
Status: DISCONTINUED | OUTPATIENT
Start: 2024-06-18 | End: 2024-06-19 | Stop reason: HOSPADM

## 2024-06-18 RX ORDER — MORPHINE SULFATE 10 MG/ML
4 INJECTION INTRAMUSCULAR; INTRAVENOUS; SUBCUTANEOUS EVERY 4 HOURS PRN
Status: DISCONTINUED | OUTPATIENT
Start: 2024-06-18 | End: 2024-06-19

## 2024-06-18 RX ADMIN — ROPIVACAINE HYDROCHLORIDE 30 ML: 7.5 INJECTION, SOLUTION EPIDURAL; PERINEURAL at 08:06

## 2024-06-18 RX ADMIN — HYDROMORPHONE HYDROCHLORIDE 0.5 MG: 2 INJECTION INTRAMUSCULAR; INTRAVENOUS; SUBCUTANEOUS at 11:06

## 2024-06-18 RX ADMIN — CEFAZOLIN 2 G: 2 INJECTION, POWDER, FOR SOLUTION INTRAMUSCULAR; INTRAVENOUS at 04:06

## 2024-06-18 RX ADMIN — SUGAMMADEX 200 MG: 100 INJECTION, SOLUTION INTRAVENOUS at 10:06

## 2024-06-18 RX ADMIN — MUPIROCIN 1 G: 20 OINTMENT TOPICAL at 08:06

## 2024-06-18 RX ADMIN — ACETAMINOPHEN 1000 MG: 10 INJECTION, SOLUTION INTRAVENOUS at 11:06

## 2024-06-18 RX ADMIN — PHENYLEPHRINE HYDROCHLORIDE 100 MCG: 10 INJECTION INTRAVENOUS at 08:06

## 2024-06-18 RX ADMIN — ROCURONIUM BROMIDE 10 MG: 10 INJECTION, SOLUTION INTRAVENOUS at 08:06

## 2024-06-18 RX ADMIN — SODIUM CHLORIDE: 9 INJECTION, SOLUTION INTRAVENOUS at 07:06

## 2024-06-18 RX ADMIN — MIDAZOLAM HYDROCHLORIDE 2 MG: 1 INJECTION, SOLUTION INTRAMUSCULAR; INTRAVENOUS at 07:06

## 2024-06-18 RX ADMIN — CEFAZOLIN 2 G: 2 INJECTION, POWDER, FOR SOLUTION INTRAMUSCULAR; INTRAVENOUS at 08:06

## 2024-06-18 RX ADMIN — DEXAMETHASONE SODIUM PHOSPHATE 4 MG: 4 INJECTION, SOLUTION INTRA-ARTICULAR; INTRALESIONAL; INTRAMUSCULAR; INTRAVENOUS; SOFT TISSUE at 08:06

## 2024-06-18 RX ADMIN — PROPOFOL 100 MG: 10 INJECTION, EMULSION INTRAVENOUS at 08:06

## 2024-06-18 RX ADMIN — VANCOMYCIN HYDROCHLORIDE 1000 MG: 1 INJECTION, POWDER, LYOPHILIZED, FOR SOLUTION INTRAVENOUS at 08:06

## 2024-06-18 RX ADMIN — FENTANYL CITRATE 50 MCG: 50 INJECTION INTRAMUSCULAR; INTRAVENOUS at 08:06

## 2024-06-18 RX ADMIN — ONDANSETRON 4 MG: 2 INJECTION INTRAMUSCULAR; INTRAVENOUS at 08:06

## 2024-06-18 RX ADMIN — HYDROCODONE BITARTRATE AND ACETAMINOPHEN 1 TABLET: 5; 325 TABLET ORAL at 08:06

## 2024-06-18 RX ADMIN — ROCURONIUM BROMIDE 10 MG: 10 INJECTION, SOLUTION INTRAVENOUS at 09:06

## 2024-06-18 RX ADMIN — FENTANYL CITRATE 50 MCG: 50 INJECTION INTRAMUSCULAR; INTRAVENOUS at 07:06

## 2024-06-18 RX ADMIN — ROCURONIUM BROMIDE 30 MG: 10 INJECTION, SOLUTION INTRAVENOUS at 08:06

## 2024-06-18 RX ADMIN — ONDANSETRON 4 MG: 2 INJECTION INTRAMUSCULAR; INTRAVENOUS at 09:06

## 2024-06-18 NOTE — OR NURSING
1115 REC'D TO PACU IN STABLE CONDITION. VSS. SATS 100% ON RA. UNABLE TO OBTAIN TEMP AT THIS TIME. APPLIED FORCED WARM AIR EQUIPMENT x2. RATES R HIP PAIN 9/10 ON PAIN SCALE. ORDERS REC'D FROM DR MILLER FOR [facility]. DSG TO R HIP C/D/I w/ HEMOVAC NOTED. WILL CONT TO MONITOR.    1135 UPDATE ON PT STATUS GIVEN TO SISTER VIA TEXT MESSAGE.    1120 TRANSFERRED TO [facility] IN STABLE CONDITION. REPORT GIVEN TO KIESHA LE. /50 HR 64 SATS 98% ON RA. SISTER AT BEDSIDE.

## 2024-06-18 NOTE — TRANSFER OF CARE
"Anesthesia Transfer of Care Note    Patient: Brandee Contreras    Procedure(s) Performed: Procedure(s) (LRB):  ROBOTIC ARTHROPLASTY,HIP (Right)    Patient location: PACU    Anesthesia Type: general    Transport from OR: Transported from OR on room air with adequate spontaneous ventilation    Post pain: adequate analgesia    Post assessment: no apparent anesthetic complications and tolerated procedure well    Post vital signs: stable    Level of consciousness: awake and oriented    Nausea/Vomiting: no nausea/vomiting    Complications: none    Transfer of care protocol was followed      Last vitals: Visit Vitals  /69 (BP Location: Right arm, Patient Position: Lying)   Pulse 67   Temp 36.8 °C (98.2 °F) (Oral)   Resp 13   Ht 5' 4" (1.626 m)   Wt 63.5 kg (140 lb)   SpO2 100%   Breastfeeding No   BMI 24.03 kg/m²     "

## 2024-06-18 NOTE — ANESTHESIA PREPROCEDURE EVALUATION
06/18/2024  Brandee Contreras is a 73 y.o., female.      Pre-op Assessment    I have reviewed the Patient Summary Reports.     I have reviewed the Nursing Notes. I have reviewed the NPO Status.   I have reviewed the Medications.     Review of Systems  Anesthesia Hx:  No problems with previous Anesthesia             Denies Family Hx of Anesthesia complications.    Denies Personal Hx of Anesthesia complications.                    Social:  Smoker, No Alcohol Use       Cardiovascular:  Exercise tolerance: good   Hypertension               Seen by Dr. Mccray for preop risk stratification and optimization - RCRI class 1, considered low risk for MACE                         Musculoskeletal:  Arthritis                   Physical Exam  General: Well nourished, Cooperative and Alert    Airway:  Mallampati: II   Mouth Opening: Normal  TM Distance: Normal  Tongue: Normal  Neck ROM: Normal ROM    Dental:  Intact    Chest/Lungs:  Clear to auscultation, Normal Respiratory Rate    Heart:  Rate: Normal  Rhythm: Regular Rhythm        Chemistry        Component Value Date/Time     06/05/2024 0916    K 4.2 06/05/2024 0916     06/05/2024 0916    CO2 29 06/05/2024 0916    BUN 14 06/05/2024 0916    CREATININE 0.69 06/05/2024 0916    GLU 77 06/05/2024 0916        Component Value Date/Time    CALCIUM 9.4 06/05/2024 0916    ALKPHOS 88 06/05/2024 0916    AST 22 06/05/2024 0916    ALT 21 06/05/2024 0916    BILITOT 0.5 06/05/2024 0916        Lab Results   Component Value Date    WBC 3.66 (L) 06/05/2024    HGB 12.9 06/05/2024    HCT 41.6 06/05/2024     06/05/2024           Anesthesia Plan  Type of Anesthesia, risks & benefits discussed:    Anesthesia Type: Gen ETT, Spinal, Regional  Intra-op Monitoring Plan: Standard ASA Monitors  Post Op Pain Control Plan: multimodal analgesia  Induction:  IV  Airway Plan: Direct,  Post-Induction  Informed Consent: Informed consent signed with the Patient and all parties understand the risks and agree with anesthesia plan.  All questions answered.   ASA Score: 2  Day of Surgery Review of History & Physical: H&P Update referred to the surgeon/provider.I have interviewed and examined the patient. I have reviewed the patient's H&P dated: There are no significant changes.     Ready For Surgery From Anesthesia Perspective.     .

## 2024-06-18 NOTE — PLAN OF CARE
[facility]  Initial Discharge Assessment       Primary Care Provider: Sonia Morris NP    Admission Diagnosis: Arthritis of right hip [M16.11]  Arthritis of both hips [M16.0]    Admission Date: 6/18/2024  Expected Discharge Date:     Transition of Care Barriers: None    Payor: [facility] MCARE / Plan: [facility] MEDICARE ADVANTAGE / Product Type: Medicare Advantage /     Extended Emergency Contact Information  Primary Emergency Contact: coreen lopez  Mobile Phone: [phone]  Relation: Mother  Preferred language: English   needed? No    Discharge Plan A: Home with family, Home Health  Discharge Plan B: Home with family, Home Health      [facility]  [address] [place] [id number]  Phone: [phone] Fax: [phone]      Initial Assessment (most recent)       Adult Discharge Assessment - 06/18/24 1431          Discharge Assessment    Assessment Type Discharge Planning Assessment     Source of Information family     People in Home parent(s)     Do you expect to return to your current living situation? Yes     Do you have help at home or someone to help you manage your care at home? Yes     Who are your caregiver(s) and their phone number(s)? Vivian Perkins- Sister [phone]     Prior to hospitilization cognitive status: Unable to Assess     Current cognitive status: Unable to Assess     Walking or Climbing Stairs Difficulty yes     Walking or Climbing Stairs ambulation difficulty, requires equipment     Mobility Management rw     Dressing/Bathing Difficulty no     Home Accessibility stairs to enter home     Number of Stairs, Main Entrance two     Equipment Currently Used at Home walker, rolling     Readmission within 30 days? No     Patient currently being followed by outpatient case management? No     Do you currently have service(s) that help you manage your care at home? No     Do you take prescription  medications? Yes     Do you have prescription coverage? Yes     Coverage [facility] [employer]aged [employer]     Do you have any problems affording any of your prescribed medications? No     Is the patient taking medications as prescribed? yes     Who is going to help you get home at discharge? Vivian Perkins-      How do you get to doctors appointments? family or friend will provide;car, drives self     Are you on dialysis? No     Do you take coumadin? No     Discharge Plan A Home with family;Home Health     Discharge Plan B Home with family;Home Health     DME Needed Upon Discharge  none     Discharge Plan discussed with: Sibling     Name(s) and Number(s) Vivian Perez     Transition of Care Barriers None        Physical Activity    On average, how many days per week do you engage in moderate to strenuous exercise (like a brisk walk)? 0 days     On average, how many minutes do you engage in exercise at this level? 0 min        Financial Resource Strain    How hard is it for you to pay for the very basics like food, housing, medical care, and heating? Not hard at all        Housing Stability    In the last 12 months, was there a time when you were not able to pay the mortgage or rent on time? No     At any time in the past 12 months, were you homeless or living in a shelter (including now)? No        Transportation Needs    Has the lack of transportation kept you from medical appointments, meetings, work or from getting things needed for daily living? No        Food Insecurity    Within the past 12 months, you worried that your food would run out before you got the money to buy more. Never true     Within the past 12 months, the food you bought just didn't last and you didn't have money to get more. Never true        Stress    Do you feel stress - tense, restless, nervous, or anxious, or unable to sleep at night because your mind is troubled all the time - these days? Not at all        Social Isolation    How often do  you feel lonely or isolated from those around you?  Never        Alcohol Use    Q1: How often do you have a drink containing alcohol? Never     Q2: How many drinks containing alcohol do you have on a typical day when you are drinking? Patient does not drink     Q3: How often do you have six or more drinks on one occasion? Never        Utilities    In the past 12 months has the electric, gas, oil, or water company threatened to shut off services in your home? No        Health Literacy    How often do you need to have someone help you when you read instructions, pamphlets, or other written material from your doctor or pharmacy? Never                   Pt in sx at time of initial dcp assessment. Pts sister, Vivian in room. Pt lives at home with mother, not current with hh and has a rw. The plan is for pt to dc home and have [facility] arranged, choice obtained. SW faxed referral to [facility] and informed Danielle of referral. I.M. not obtained due to pt being OP. SDOH questions completed. SW will cont to follow for dc needs.

## 2024-06-18 NOTE — OP NOTE
[facility] - Orthopedic Periop Services  General Surgery  Operative Note    SUMMARY     Date of Procedure: 6/18/2024     Procedure: Procedure(s) (LRB):  ROBOTIC ARTHROPLASTY,HIP (Right)       Surgeons and Role:     * He Young MD - Primary    Assisting Surgeon: None    Pre-Operative Diagnosis: Arthritis of right hip [M16.11]    Post-Operative Diagnosis: Post-Op Diagnosis Codes:     * Arthritis of right hip [M16.11]    Anesthesia: General    Operative Findings (including complications, if any):        OPERATIVE REPORT      PRE-OP DIAGNOSIS: Severe degenerative joint disease right hip.     POST-OP DIAGNOSIS: Severe degenerative joint disease right hip.    PROCEDURE:  Robotic-assisted right total hip arthroplasty.     SURGEON:  He Young M.D.    ASSISTING SURGEON:      ANESTHESIA:   General    COMPLICATIONS: None.    IMPLANTS PLACED:    Implant Name Type Inv. Item Serial No.  Lot No. LRB No. Used Action   PIN BONE 4 X 140MM STERILE - [id number]  PIN BONE 4 X 140MM STERILE  [employer]  Right 1 Implanted and Explanted   PIN BONE 4 X 140MM STERILE - [id number]  PIN BONE 4 X 140MM STERILE  [employer]  Right 1 Implanted and Explanted   SHELL TRIDENT II CLUSTER 50MM - [id number]  SHELL TRIDENT II CLUSTER 50MM  PETE Langtice AMARI. [id number] Right 1 Implanted   PETE TRIDENT X3 10 DEGREE POLYETHYLENE INSERT 32 MM ID     [id number] Right 1 Implanted   SCREW TRIDENT II LP HEX 6.5X20 - [id number]  SCREW TRIDENT II LP HEX 6.5X20  PETE Langtice AMARI. [id number] Right 1 Implanted   STEM ACC II 27 DEG SZ 5 - [id number]  STEM ACC II 27 DEG SZ 5  PETE Langtice AMARI. [id number] Right 1 Implanted   HEAD V40 TAPR LFIT COCR 32M +0 - [id number]  HEAD V40 TAPR LFIT COCR 32M +0  PETE Langtice AMARI. [id number] Right 1 Implanted   PETE DALL MILES CABLE 2.0 MM     [id number] Right 1 Implanted       ESTIMATED BLOOD LOSS:  200cc    INDICATION:  Patient is a 73 y.o. year old female with severe degenerative joint disease  of the right hip needing total hip arthroplasty.    PROCEDURE IN DETAIL:  After having the risks and benefits of the procedure explained at length to the patient and the patient stating that they understand the risks and benefits of the procedure and wishes to proceed with the procedure, written informed consent was obtained.  The patient was taken to the Operating room and placed in the supine position on the operative table at which time General was induced per anesthesia. The patient was then positioned into the lateral position with her hip up.  All bony prominences well padded with an axillary roll underneath her right axilla.  At this point, the patients right lower extremity was prepped and draped in sterile fashion up to above the pelvic bone.    At this point using the Sirific WirelessOplasty robotic system a 10 - 15 centimeter incision was made centered over the greater trochanter going from the anterior superior iliac spine distally over the lateral aspect of the femur.  An incision was made with a #10 blade down through the skin and fascia donnie.  Hemostasis was maintained with a Bovie electrocautery.  At this point, a nick was made in the fascia donnie and using curved Massey scissors, the fascia donnie was incised along with an incision as well as splitting the gluteus maximums fascia in line with its fibers splitting the gluteus muscle.  This exposed the greater trochanter.  A self-retaining retractor was placed in and at this point, the greater trochanter and trochanteric bursa were exposed.  The trochanteric bursa was then removed with pick-ups and Bovie electrocautery exposing the gluteus medius and minimums tendons.  At this point, starting about 2 centimeter below the origin of the vastus lateralis and taking off the proximal one or two centimeters of vastus lateralis in line with the anterior aspect of the femur, coming proximally, the vastus lateralis as well as the gluteus medius tendon were removed off the  anterior aspect of the greater trochanter up to the tip of the trochanter and then splitting the gluteus fibers in line with the muscle fibers.  Two #5 Tycrons were placed as holding sutures to later use to repair the gluteus fascia back and tendon back to the greater trochanter.  Once the gluteus tendons were reflected this exposed the anterior aspect of the hip capsule and it was split in an H fashion taking it down off the base of the neck and then making a longitudinal split to the edge of the acetabulum on the inferior and superior borders of the femoral neck.  Stay suture was placed in each edge of this.  The #5 Tycron was later used to repair it back.  Once the capsule was reflected, the hip was externally rotated and the knee flexed and the hip dislocated anteriorly.    A 1.5 centimeter femoral neck cut was made marking the appropriate line of the neck cut with the cutting guide.  At this point, using an oscillating saw, the femoral neck was made.  The femoral head was removed. It was noted to be void of any articular cartilage on the superior weightbearing aspect.  At this point, the leg was placed back down on the table, Hohmann retractor was placed on the anterior and posterior aspect of the fascia acetabulum down on bone and using a Bovie electrocautery, the labrum of the acetabulum was removed as well as anterior and posterior osteophytes. At this point starting out with a size 44 millimeter reamer and reaming up the acetabulum was prepared down to bleeding bone. At this point it was irrigated out and a 50 millimeter trident 21 cup was press fit in the appropriate, approximate, 45 degree of inclination and 10-15 degrees of anterversion. It was impacted into position and firmly seated in the acetabulum.  At this point 1 acetabular screw was placed in. The neutral liner trial was then placed in.    The cookie cutter was used to open up the proximal femoral canal and then the canal finding reamer placed  down.  It was then reamed with the  in the canal and then broached.  Once this was dont the calcar was planed with the calcar planer.  It was placed into approximately 10 degrees of anterversion on the femoral stem.  Once this was done, with good control of the femur with the size 5 implant in place, a trial reduction was done with a +0 head 32 millimeter.  The hip was noted to be stable with a full range of motion.  At this point ht hip was dislocated, once again.  The acetabular trial liner was removed after the broach had removed the femoral broach.  A 10 degree lipped liner was then impacted into position.  Once it was firmly seated and impacted into position into the cup, the stem; the size 5 press fit accolade; was impacted into position in the femur. A trial reduction with a +032 head was done.  It was noted to have a full range of motion and be stable with no pistoning at 0 and 30 degrees.  At this point the hip was dislocated. The trial head was removed.  The Harman taper was cleaned with a wet followed by a dry lap sponge and the 32 millimeter +0 head was impacted into position.  The hip was relocated with no soft tissue in the cup.  It was noted to have a full range of motion and be a stable hip.  Patient after the broach was removed and before the final implant was placed the calcar area was noted to have a questionable crack at this time a Dall-Miles cable was placed around the proximal femur as a preventative measure.    At this point the wound was irrigated out with pulsatile lavage. Two medium Hemovac drains were placed deep to the fascia.  Using a free needle and making bone tunnels in the trochanter, the anterior capsule was repaired back to the trochanter followed by the gluteus tendon.  The gluteus fascia as well as the vastus lateralis fascia was closed with a running #1 Vicryl.  The fascia donnie was closed with a running #1 Vicryl.  Subcuticular stitches of 2-0 Vicryl followed by  skin staples were used to close the skin.  A sterile occlusive dressing was placed. Patient had an abduction pillow placed.  Intra-operative x-rays were obtained; AP pelvis; once the patient was in the supine position, once again showing both hips well reduced.  At this point the patient was then taken from the Operative table and taken to the Recovery Room in good condition. All counts were correct.  There were no complications.           Description of Technical Procedures:     Significant Surgical Tasks Conducted by the Assistant(s), if Applicable:     Estimated Blood Loss (EBL): 200 mL           Implants:   Implant Name Type Inv. Item Serial No.  Lot No. LRB No. Used Action   PIN BONE 4 X 140MM STERILE - [id number]  PIN BONE 4 X 140MM STERILE  [employer]  Right 1 Implanted and Explanted   PIN BONE 4 X 140MM STERILE - [id number]  PIN BONE 4 X 140MM STERILE  [employer]  Right 1 Implanted and Explanted   SHELL TRIDENT II CLUSTER 50MM - [id number]  SHELL TRIDENT II CLUSTER 50MM  PETE Intern AMARI. [id number] Right 1 Implanted   PETE TRIDENT X3 10 DEGREE POLYETHYLENE INSERT 32 MM ID     [id number] Right 1 Implanted   SCREW TRIDENT II LP HEX 6.5X20 - [id number]  SCREW TRIDENT II LP HEX 6.5X20  PETE Intern AMARI. [id number] Right 1 Implanted   STEM ACC II 27 DEG SZ 5 - [id number]  STEM ACC II 27 DEG SZ 5  PETE Intern AMARI. [id number] Right 1 Implanted   HEAD V40 TAPR LFIT COCR 32M +0 - [id number]  HEAD V40 TAPR LFIT COCR 32M +0  PETE Intern AMARI. [id number] Right 1 Implanted   PETE DALL MILES CABLE 2.0 MM     [id number] Right 1 Implanted       Specimens:   Specimen (24h ago, onward)       Start     Ordered    06/18/24 0909  Surgical Pathology  RELEASE UPON ORDERING         06/18/24 0909 06/18/24 0909  Surgical Pathology  Once        Question Answer Comment   Number of specimens 1    Source(s): Femoral Head, Right    Clinical History: RIGHT HIP DJD        06/18/24 0908                             Condition: Good    Disposition: PACU - hemodynamically stable.    Attestation: I was present and scrubbed for the entire procedure.

## 2024-06-18 NOTE — PT/OT/SLP PROGRESS
Physical Therapy      Patient Name:  Brandee Contreras   MRN:  [id number]    Patient not seen today secondary to Motor block (Attempted PT eval on multiple occasions, pt unable to feel RLE and has no active R quad contraction). Will follow-up 6/19/24.

## 2024-06-18 NOTE — ANESTHESIA PROCEDURE NOTES
Intubation    Date/Time: 6/18/2024 8:01 AM    Performed by: Haroldo Isbell CRNA  Authorized by: Junaid Madrid MD    Intubation:     Induction:  Intravenous    Intubated:  Postinduction    Mask Ventilation:  Easy mask    Attempts:  1    Attempted By:  CRNA    Method of Intubation:  Direct    Blade:  Cecil 3    Laryngeal View Grade: Grade IIA - cords partially seen      Difficult Airway Encountered?: No      Complications:  None    Airway Device:  Oral endotracheal tube    Airway Device Size:  7.0    Style/Cuff Inflation:  Cuffed (inflated to minimal occlusive pressure)    Tube secured:  21    Secured at:  The lips    Placement Verified By:  Capnometry    Complicating Factors:  Poor neck/head extension    Findings Post-Intubation:  BS equal bilateral and atraumatic/condition of teeth unchanged

## 2024-06-18 NOTE — PT/OT/SLP PROGRESS
Occupational Therapy      Patient Name:  Brandee Contreras   MRN:  [id number]    Patient not seen today secondary to Eval attempted at 1411 but Pt could not feel upper part of RLE . Will follow-up 7/19/24.    6/18/2024

## 2024-06-19 VITALS
WEIGHT: 162.31 LBS | RESPIRATION RATE: 18 BRPM | HEIGHT: 64 IN | TEMPERATURE: 99 F | OXYGEN SATURATION: 100 % | SYSTOLIC BLOOD PRESSURE: 109 MMHG | HEART RATE: 79 BPM | BODY MASS INDEX: 27.71 KG/M2 | DIASTOLIC BLOOD PRESSURE: 51 MMHG

## 2024-06-19 LAB
ALBUMIN SERPL BCP-MCNC: 2.7 G/DL (ref 3.5–5)
ALBUMIN/GLOB SERPL: 1 {RATIO}
ALP SERPL-CCNC: 57 U/L (ref 55–142)
ALT SERPL W P-5'-P-CCNC: 19 U/L (ref 13–56)
ANION GAP SERPL CALCULATED.3IONS-SCNC: 9 MMOL/L (ref 7–16)
AST SERPL W P-5'-P-CCNC: 19 U/L (ref 15–37)
BASOPHILS # BLD AUTO: 0.01 K/UL (ref 0–0.2)
BASOPHILS NFR BLD AUTO: 0.1 % (ref 0–1)
BILIRUB SERPL-MCNC: 0.3 MG/DL (ref ?–1.2)
BUN SERPL-MCNC: 11 MG/DL (ref 7–18)
BUN/CREAT SERPL: 17 (ref 6–20)
CALCIUM SERPL-MCNC: 7.8 MG/DL (ref 8.5–10.1)
CHLORIDE SERPL-SCNC: 110 MMOL/L (ref 98–107)
CO2 SERPL-SCNC: 23 MMOL/L (ref 21–32)
CREAT SERPL-MCNC: 0.63 MG/DL (ref 0.55–1.02)
DIFFERENTIAL METHOD BLD: ABNORMAL
EGFR (NO RACE VARIABLE) (RUSH/TITUS): 93 ML/MIN/1.73M2
EOSINOPHIL # BLD AUTO: 0.01 K/UL (ref 0–0.5)
EOSINOPHIL NFR BLD AUTO: 0.1 % (ref 1–4)
ERYTHROCYTE [DISTWIDTH] IN BLOOD BY AUTOMATED COUNT: 13.1 % (ref 11.5–14.5)
GLOBULIN SER-MCNC: 2.8 G/DL (ref 2–4)
GLUCOSE SERPL-MCNC: 123 MG/DL (ref 74–106)
GLUCOSE SERPL-MCNC: 129 MG/DL (ref 70–105)
GLUCOSE SERPL-MCNC: 134 MG/DL (ref 70–105)
HCT VFR BLD AUTO: 27.8 % (ref 38–47)
HGB BLD-MCNC: 9 G/DL (ref 12–16)
IMM GRANULOCYTES # BLD AUTO: 0.02 K/UL (ref 0–0.04)
IMM GRANULOCYTES NFR BLD: 0.3 % (ref 0–0.4)
LYMPHOCYTES # BLD AUTO: 0.94 K/UL (ref 1–4.8)
LYMPHOCYTES NFR BLD AUTO: 11.9 % (ref 27–41)
MCH RBC QN AUTO: 30 PG (ref 27–31)
MCHC RBC AUTO-ENTMCNC: 32.4 G/DL (ref 32–36)
MCV RBC AUTO: 92.7 FL (ref 80–96)
MONOCYTES # BLD AUTO: 0.81 K/UL (ref 0–0.8)
MONOCYTES NFR BLD AUTO: 10.3 % (ref 2–6)
MPC BLD CALC-MCNC: 10.7 FL (ref 9.4–12.4)
NEUTROPHILS # BLD AUTO: 6.08 K/UL (ref 1.8–7.7)
NEUTROPHILS NFR BLD AUTO: 77.3 % (ref 53–65)
NRBC # BLD AUTO: 0 X10E3/UL
NRBC, AUTO (.00): 0 %
PLATELET # BLD AUTO: 173 K/UL (ref 150–400)
POTASSIUM SERPL-SCNC: 3.4 MMOL/L (ref 3.5–5.1)
PROT SERPL-MCNC: 5.5 G/DL (ref 6.4–8.2)
RBC # BLD AUTO: 3 M/UL (ref 4.2–5.4)
SODIUM SERPL-SCNC: 139 MMOL/L (ref 136–145)
WBC # BLD AUTO: 7.87 K/UL (ref 4.5–11)

## 2024-06-19 PROCEDURE — 97530 THERAPEUTIC ACTIVITIES: CPT

## 2024-06-19 PROCEDURE — 63600175 PHARM REV CODE 636 W HCPCS: Performed by: ORTHOPAEDIC SURGERY

## 2024-06-19 PROCEDURE — 97110 THERAPEUTIC EXERCISES: CPT

## 2024-06-19 PROCEDURE — 97116 GAIT TRAINING THERAPY: CPT

## 2024-06-19 PROCEDURE — 97535 SELF CARE MNGMENT TRAINING: CPT

## 2024-06-19 PROCEDURE — 82962 GLUCOSE BLOOD TEST: CPT | Mod: 91

## 2024-06-19 PROCEDURE — 97161 PT EVAL LOW COMPLEX 20 MIN: CPT

## 2024-06-19 PROCEDURE — 80053 COMPREHEN METABOLIC PANEL: CPT | Performed by: ORTHOPAEDIC SURGERY

## 2024-06-19 PROCEDURE — 85025 COMPLETE CBC W/AUTO DIFF WBC: CPT | Performed by: ORTHOPAEDIC SURGERY

## 2024-06-19 PROCEDURE — 97166 OT EVAL MOD COMPLEX 45 MIN: CPT

## 2024-06-19 PROCEDURE — 25000003 PHARM REV CODE 250: Performed by: ORTHOPAEDIC SURGERY

## 2024-06-19 PROCEDURE — 99900035 HC TECH TIME PER 15 MIN (STAT)

## 2024-06-19 PROCEDURE — 36415 COLL VENOUS BLD VENIPUNCTURE: CPT | Performed by: ORTHOPAEDIC SURGERY

## 2024-06-19 RX ORDER — OXYCODONE AND ACETAMINOPHEN 10; 325 MG/1; MG/1
1 TABLET ORAL EVERY 6 HOURS PRN
Qty: 28 TABLET | Refills: 0 | Status: SHIPPED | OUTPATIENT
Start: 2024-06-19 | End: 2024-06-26

## 2024-06-19 RX ORDER — MORPHINE SULFATE 4 MG/ML
4 INJECTION, SOLUTION INTRAMUSCULAR; INTRAVENOUS EVERY 4 HOURS PRN
Status: DISCONTINUED | OUTPATIENT
Start: 2024-06-19 | End: 2024-06-19 | Stop reason: HOSPADM

## 2024-06-19 RX ADMIN — AMLODIPINE BESYLATE 5 MG: 5 TABLET ORAL at 08:06

## 2024-06-19 RX ADMIN — HYDROCODONE BITARTRATE AND ACETAMINOPHEN 1 TABLET: 5; 325 TABLET ORAL at 04:06

## 2024-06-19 RX ADMIN — MUPIROCIN 1 G: 20 OINTMENT TOPICAL at 10:06

## 2024-06-19 RX ADMIN — MORPHINE SULFATE 4 MG: 4 INJECTION INTRAVENOUS at 12:06

## 2024-06-19 RX ADMIN — CEFAZOLIN 2 G: 2 INJECTION, POWDER, FOR SOLUTION INTRAMUSCULAR; INTRAVENOUS at 12:06

## 2024-06-19 RX ADMIN — MORPHINE SULFATE 4 MG: 4 INJECTION INTRAVENOUS at 08:06

## 2024-06-19 RX ADMIN — HYDROCODONE BITARTRATE AND ACETAMINOPHEN 1 TABLET: 5; 325 TABLET ORAL at 12:06

## 2024-06-19 RX ADMIN — CHLORTHALIDONE 25 MG: 25 TABLET ORAL at 08:06

## 2024-06-19 RX ADMIN — APIXABAN 2.5 MG: 2.5 TABLET, FILM COATED ORAL at 08:06

## 2024-06-19 NOTE — CONSULTS
[facility] - [facility] Telemetry Hospital Medicine  Consult Note    Patient Name: Brandee Contreras  MRN: [id number]  Admission Date: 6/18/2024  Hospital Length of Stay: 0 days  Attending Physician: He Young MD   Primary Care Provider: Sonia Morris NP           Patient information was obtained from patient and past medical records.     Consults  Subjective:     Principal Problem: Arthritis of both hips    Chief Complaint: No chief complaint on file.       HPI: Patient is a very pleasant 73-year-old  female with end-stage arthritis of her right hip.  Patient had prior hip replacement on the left.  She was admitted today for right total hip replacement by Dr. Young.  The patient has had an unremarkable perioperative course.  However she is still having numbness and weakness of her right leg and was not able to begin physical therapy after surgery.  She has a relatively benign past medical history notable for hypertension.  Past medical surgical and family history reviewed.  She does continue to smoke a quarter of a pack of cigarettes per day.  Medicines and allergies reviewed.    Past Medical History:   Diagnosis Date    Arthritis     Hypertension     Nicotine dependence        Past Surgical History:   Procedure Laterality Date    JOINT REPLACEMENT Left 11/14/2023    LT     Dr. He Young    ROBOTIC ARTHROPLASTY, HIP Left 11/14/2023    Procedure: ROBOTIC ARTHROPLASTY,HIP;  Surgeon: He Young MD;  [facility];  Service: Orthopedics;  Laterality: Left;    VAGINAL DELIVERY         Review of patient's allergies indicates:  No Known Allergies    No current facility-administered medications on file prior to encounter.     Current Outpatient Medications on File Prior to Encounter   Medication Sig    ibandronate (BONIVA) 150 mg tablet Take 1 tablet (150 mg total) by mouth every 30 days. (Patient not taking: Reported on 6/5/2024)    [DISCONTINUED]  celecoxib (CELEBREX) 200 MG capsule TAKE ONE (1) CAPSULE BY MOUTH EVERY DAY  WITH FOOD (Patient not taking: Reported on 6/5/2024)    [DISCONTINUED] HYDROcodone-acetaminophen (NORCO)  mg per tablet Take 1 tablet by mouth every 6 (six) hours as needed for Pain. (Patient not taking: Reported on 6/5/2024)    [DISCONTINUED] HYDROcodone-acetaminophen (NORCO)  mg per tablet Take 1 tablet by mouth every 6 (six) hours as needed for Pain. (Patient not taking: Reported on 6/5/2024)    [DISCONTINUED] HYDROcodone-acetaminophen (NORCO)  mg per tablet Take 1 tablet by mouth every 6 (six) hours as needed for Pain. (Patient not taking: Reported on 6/5/2024)    [DISCONTINUED] sulfamethoxazole-trimethoprim 800-160mg (BACTRIM DS) 800-160 mg Tab Take 1 tablet by mouth 2 (two) times daily. (Patient not taking: Reported on 11/13/2023)     Family History       Problem Relation (Age of Onset)    Hypertension Maternal Grandmother          Tobacco Use    Smoking status: Former     Current packs/day: 0.25     Average packs/day: 0.3 packs/day for 3.4 years (0.8 ttl pk-yrs)     Types: Cigarettes     Start date: 02/2021     Passive exposure: Current    Smokeless tobacco: Never   Substance and Sexual Activity    Alcohol use: Never    Drug use: Never    Sexual activity: Yes     Review of Systems   Constitutional:  Negative for chills and fever.   HENT:  Negative for sinus pressure and sinus pain.    Eyes:  Negative for pain and visual disturbance.   Respiratory:  Negative for cough and shortness of breath.    Cardiovascular:  Negative for chest pain, palpitations and leg swelling.   Gastrointestinal:  Negative for abdominal pain, nausea and vomiting.   Genitourinary:  Negative for dysuria and hematuria.   Musculoskeletal:  Negative for neck pain and neck stiffness.   Skin:  Negative for color change and rash.   Neurological:  Negative for dizziness and weakness.   Psychiatric/Behavioral:  Negative for confusion and dysphoric mood.       Objective:     Vital Signs (Most Recent):  Temp: 97.8 °F (36.6 °C) (06/18/24 2008)  Pulse: 73 (06/18/24 2008)  Resp: 18 (06/18/24 2024)  BP: 133/75 (06/18/24 2008)  SpO2: 99 % (06/18/24 2008) Vital Signs (24h Range):  Temp:  [97.4 °F (36.3 °C)-98.2 °F (36.8 °C)] 97.8 °F (36.6 °C)  Pulse:  [63-80] 73  Resp:  [0-18] 18  SpO2:  [96 %-100 %] 99 %  BP: (102-169)/(47-76) 133/75     Weight: 73.6 kg (162 lb 4.8 oz)  Body mass index is 27.86 kg/m².     Physical Exam  Vitals reviewed.   Constitutional:       General: She is not in acute distress.     Appearance: She is not toxic-appearing.   HENT:      Head: Normocephalic and atraumatic.      Right Ear: External ear normal.      Left Ear: External ear normal.      Nose: Nose normal.      Mouth/Throat:      Mouth: Mucous membranes are moist.      Pharynx: Oropharynx is clear.   Eyes:      General: No scleral icterus.     Extraocular Movements: Extraocular movements intact.      Conjunctiva/sclera: Conjunctivae normal.      Pupils: Pupils are equal, round, and reactive to light.   Cardiovascular:      Rate and Rhythm: Normal rate and regular rhythm.      Heart sounds: Normal heart sounds.   Pulmonary:      Effort: Pulmonary effort is normal. No respiratory distress.      Breath sounds: Normal breath sounds. No wheezing.   Abdominal:      General: Abdomen is flat. Bowel sounds are normal. There is no distension.      Palpations: Abdomen is soft.      Tenderness: There is no abdominal tenderness.   Musculoskeletal:      Cervical back: Normal range of motion and neck supple.      Right lower leg: No edema.      Left lower leg: No edema.   Skin:     General: Skin is warm and dry.   Neurological:      Mental Status: She is alert.      Sensory: Sensory deficit present.      Comments: Numbness of her right anterior thigh.  Can not contract quad muscles.  Suspect related to block.   Psychiatric:         Mood and Affect: Mood normal.         Behavior: Behavior normal.           Significant Labs: All pertinent labs within the past 24 hours have been reviewed.  BMP:   Recent Labs   Lab 06/18/24  1144   *      K 4.2   *   CO2 26   BUN 10   CREATININE 0.65   CALCIUM 7.5*     CBC:   Recent Labs   Lab 06/18/24  1144   WBC 8.87   HGB 10.4*   HCT 32.4*          Significant Imaging: I have reviewed all pertinent imaging results/findings within the past 24 hours.  Assessment/Plan:     * Arthritis of both hips    Now status post right total hip replacement.  Past history of left hip replacement.  Postoperative care as per Orthopedics.  Apixaban for DVT prophylaxis    Hypertension  Chronic, controlled. Latest blood pressure and vitals reviewed-     Temp:  [97.4 °F (36.3 °C)-98.2 °F (36.8 °C)]   Pulse:  [63-80]   Resp:  [0-18]   BP: (102-169)/(47-76)   SpO2:  [96 %-100 %] .   Home meds for hypertension were reviewed and noted below.   Hypertension Medications               amLODIPine (NORVASC) 5 MG tablet Take 1 tablet (5 mg total) by mouth once daily.    chlorthalidone (HYGROTEN) 25 MG Tab Take 1 tablet (25 mg total) by mouth once daily.            While in the hospital, will manage blood pressure as follows; Continue home antihypertensive regimen    Will utilize p.r.n. blood pressure medication only if patient's blood pressure greater than 180/110 and she develops symptoms such as worsening chest pain or shortness of breath.      VTE Risk Mitigation (From admission, onward)           Ordered     apixaban tablet 2.5 mg  2 times daily         06/18/24 1229     IP VTE HIGH RISK PATIENT  Once         06/18/24 1229     Place DINORA hose  Until discontinued        Comments: DINORA to BLE    06/18/24 1229     Place sequential compression device  Until discontinued        Comments: BLE    06/18/24 1229                        Thank you for your consult. I will follow-up with patient. Please contact us if you have any additional questions.    Srinath Cabrera Jr, MD  Department of Hospital  Medicine   [facility] - [address] [place] [address]

## 2024-06-19 NOTE — SUBJECTIVE & OBJECTIVE
Past Medical History:   Diagnosis Date    Arthritis     Hypertension     Nicotine dependence        Past Surgical History:   Procedure Laterality Date    JOINT REPLACEMENT Left 11/14/2023    LTHR     Dr. He Young    ROBOTIC ARTHROPLASTY, HIP Left 11/14/2023    Procedure: ROBOTIC ARTHROPLASTY,HIP;  Surgeon: He Young MD;  Location: [facility];  Service: Orthopedics;  Laterality: Left;    VAGINAL DELIVERY         Review of patient's allergies indicates:  No Known Allergies    No current facility-administered medications on file prior to encounter.     Current Outpatient Medications on File Prior to Encounter   Medication Sig    ibandronate (BONIVA) 150 mg tablet Take 1 tablet (150 mg total) by mouth every 30 days. (Patient not taking: Reported on 6/5/2024)    [DISCONTINUED] celecoxib (CELEBREX) 200 MG capsule TAKE ONE (1) CAPSULE BY MOUTH EVERY DAY  WITH FOOD (Patient not taking: Reported on 6/5/2024)    [DISCONTINUED] HYDROcodone-acetaminophen (NORCO)  mg per tablet Take 1 tablet by mouth every 6 (six) hours as needed for Pain. (Patient not taking: Reported on 6/5/2024)    [DISCONTINUED] HYDROcodone-acetaminophen (NORCO)  mg per tablet Take 1 tablet by mouth every 6 (six) hours as needed for Pain. (Patient not taking: Reported on 6/5/2024)    [DISCONTINUED] HYDROcodone-acetaminophen (NORCO)  mg per tablet Take 1 tablet by mouth every 6 (six) hours as needed for Pain. (Patient not taking: Reported on 6/5/2024)    [DISCONTINUED] sulfamethoxazole-trimethoprim 800-160mg (BACTRIM DS) 800-160 mg Tab Take 1 tablet by mouth 2 (two) times daily. (Patient not taking: Reported on 11/13/2023)     Family History       Problem Relation (Age of Onset)    Hypertension Maternal Grandmother          Tobacco Use    Smoking status: Former     Current packs/day: 0.25     Average packs/day: 0.3 packs/day for 3.4 years (0.8 ttl pk-yrs)     Types: Cigarettes     Start date: 02/2021     Passive exposure:  Current    Smokeless tobacco: Never   Substance and Sexual Activity    Alcohol use: Never    Drug use: Never    Sexual activity: Yes     Review of Systems   Constitutional:  Negative for chills and fever.   HENT:  Negative for sinus pressure and sinus pain.    Eyes:  Negative for pain and visual disturbance.   Respiratory:  Negative for cough and shortness of breath.    Cardiovascular:  Negative for chest pain, palpitations and leg swelling.   Gastrointestinal:  Negative for abdominal pain, nausea and vomiting.   Genitourinary:  Negative for dysuria and hematuria.   Musculoskeletal:  Negative for neck pain and neck stiffness.   Skin:  Negative for color change and rash.   Neurological:  Negative for dizziness and weakness.   Psychiatric/Behavioral:  Negative for confusion and dysphoric mood.      Objective:     Vital Signs (Most Recent):  Temp: 97.8 °F (36.6 °C) (06/18/24 2008)  Pulse: 73 (06/18/24 2008)  Resp: 18 (06/18/24 2024)  BP: 133/75 (06/18/24 2008)  SpO2: 99 % (06/18/24 2008) Vital Signs (24h Range):  Temp:  [97.4 °F (36.3 °C)-98.2 °F (36.8 °C)] 97.8 °F (36.6 °C)  Pulse:  [63-80] 73  Resp:  [0-18] 18  SpO2:  [96 %-100 %] 99 %  BP: (102-169)/(47-76) 133/75     Weight: 73.6 kg (162 lb 4.8 oz)  Body mass index is 27.86 kg/m².     Physical Exam  Vitals reviewed.   Constitutional:       General: She is not in acute distress.     Appearance: She is not toxic-appearing.   HENT:      Head: Normocephalic and atraumatic.      Right Ear: External ear normal.      Left Ear: External ear normal.      Nose: Nose normal.      Mouth/Throat:      Mouth: Mucous membranes are moist.      Pharynx: Oropharynx is clear.   Eyes:      General: No scleral icterus.     Extraocular Movements: Extraocular movements intact.      Conjunctiva/sclera: Conjunctivae normal.      Pupils: Pupils are equal, round, and reactive to light.   Cardiovascular:      Rate and Rhythm: Normal rate and regular rhythm.      Heart sounds: Normal heart  sounds.   Pulmonary:      Effort: Pulmonary effort is normal. No respiratory distress.      Breath sounds: Normal breath sounds. No wheezing.   Abdominal:      General: Abdomen is flat. Bowel sounds are normal. There is no distension.      Palpations: Abdomen is soft.      Tenderness: There is no abdominal tenderness.   Musculoskeletal:      Cervical back: Normal range of motion and neck supple.      Right lower leg: No edema.      Left lower leg: No edema.   Skin:     General: Skin is warm and dry.   Neurological:      Mental Status: She is alert.      Sensory: Sensory deficit present.      Comments: Numbness of her right anterior thigh.  Can not contract quad muscles.  Suspect related to block.   Psychiatric:         Mood and Affect: Mood normal.         Behavior: Behavior normal.          Significant Labs: All pertinent labs within the past 24 hours have been reviewed.  BMP:   Recent Labs   Lab 06/18/24  1144   *      K 4.2   *   CO2 26   BUN 10   CREATININE 0.65   CALCIUM 7.5*     CBC:   Recent Labs   Lab 06/18/24  1144   WBC 8.87   HGB 10.4*   HCT 32.4*          Significant Imaging: I have reviewed all pertinent imaging results/findings within the past 24 hours.

## 2024-06-19 NOTE — PLAN OF CARE
Problem: Adult Inpatient Plan of Care  Goal: Plan of Care Review  6/18/2024 2157 by Verito Owen LPN  Outcome: Progressing  6/18/2024 2157 by Verito Owen LPN  Outcome: Progressing  Goal: Patient-Specific Goal (Individualized)  6/18/2024 2157 by Verito Owen LPN  Outcome: Progressing  6/18/2024 2157 by Verito Owen LPN  Outcome: Progressing  Goal: Absence of Hospital-Acquired Illness or Injury  6/18/2024 2157 by Verito Owen LPN  Outcome: Progressing  6/18/2024 2157 by Verito Owen LPN  Outcome: Progressing  Goal: Optimal Comfort and Wellbeing  6/18/2024 2157 by Verito Owen LPN  Outcome: Progressing  6/18/2024 2157 by Verito Owen LPN  Outcome: Progressing  Goal: Readiness for Transition of Care  6/18/2024 2157 by Verito Owen LPN  Outcome: Progressing  6/18/2024 2157 by Verito Owen LPN  Outcome: Progressing     Problem: Infection  Goal: Absence of Infection Signs and Symptoms  6/18/2024 2157 by Verito Owen LPN  Outcome: Progressing  6/18/2024 2157 by Verito Owen LPN  Outcome: Progressing     Problem: Wound  Goal: Optimal Coping  6/18/2024 2157 by Verito Owen LPN  Outcome: Progressing  6/18/2024 2157 by Verito Owen LPN  Outcome: Progressing  Goal: Optimal Functional Ability  6/18/2024 2157 by Verito Owen LPN  Outcome: Progressing  6/18/2024 2157 by Verito Owen LPN  Outcome: Progressing  Goal: Absence of Infection Signs and Symptoms  6/18/2024 2157 by Verito Owen LPN  Outcome: Progressing  6/18/2024 2157 by Verito Owen LPN  Outcome: Progressing  Goal: Improved Oral Intake  6/18/2024 2157 by Verito Owen LPN  Outcome: Progressing  6/18/2024 2157 by Verito Owen LPN  Outcome: Progressing  Goal: Optimal Pain Control and Function  6/18/2024 2157 by Verito Owen LPN  Outcome: Progressing  6/18/2024 2157 by Verito Owen LPN  Outcome: Progressing  Goal: Skin Health and Integrity  6/18/2024 2157 by Verito Owen LPN  Outcome:  Progressing  6/18/2024 2157 by Verito Owen LPN  Outcome: Progressing  Goal: Optimal Wound Healing  6/18/2024 2157 by Verito Owen LPN  Outcome: Progressing  6/18/2024 2157 by Verito Owen LPN  Outcome: Progressing     Problem: Fall Injury Risk  Goal: Absence of Fall and Fall-Related Injury  6/18/2024 2157 by Verito Owen LPN  Outcome: Progressing  6/18/2024 2157 by Verito Owen LPN  Outcome: Progressing

## 2024-06-19 NOTE — NURSING
Nasreen care performed, blas cath removed, pt saad well, mapprox 550cc of clear yellow urine returned

## 2024-06-19 NOTE — PLAN OF CARE
[facility] Telemetry  Discharge Final Note    Primary Care Provider: Sonia Morris NP    Expected Discharge Date: 6/19/2024    Final Discharge Note (most recent)       Final Note - 06/19/24 1049          Final Note    Assessment Type Final Discharge Note     Anticipated Discharge Disposition [place] [facility]     What phone number can be called within the next 1-3 days to see how you are doing after discharge? [phone]        Post-Acute Status    Post-Acute Authorization Home Health     Home Health Status Set-up Complete/Auth obtained     Patient choice form signed by patient/caregiver List with quality metrics by geographic area provided;List from CMS Compare     Discharge Delays None known at this time                     Important Message from Medicare              Follow-up providers       He Young MD   Specialty: Orthopedic Surgery    [address]   [address]  He Young Md, Orthopedic & Sports Medicine, [facility] [address]   Phone: [phone]       Next Steps: Follow up on 7/10/2024    Instructions: 9:30 am              After-discharge care                Home Medical Care       [facility]   Service: Home Health Services    [address] [place] [address] C  [place] [address]   Phone: [phone]                             Pt to dc home today. SW informed Danielle of [facility] [place] today. Pt has been set up for admission on tomorrow. No other needs.

## 2024-06-19 NOTE — NURSING
*Keep dressing dry and intact, do not remove dressing, if dressing becomes wet or bloody notify home health staff.  Swingbed/Home Health will remove your drain (if you have one) on post op day #2 and change your dressing on post op day #3 and day #10, give them that special dressing we sent home with you.  *Continue incentive spirometry at least every 2 hours while awake.  *Continue white stockings remove 2 times a day for 1 hour and replace.   *Continue ice pack to hip  *Take laxative of choice to have a bowel movement at least by tomorrow and then every other day.  *Increase fluids by mouth.  *Staples will be removed by home health/swingbed staff in 2 weeks from surgery prior to follow up appointment  *Continue pillows between legs for abduction  *Notify swingbed/home health staff if any concerns. *Keep dressing dry and intact, do not remove dressing, if dressing becomes wet or bloody notify home health staff.  Swingbed/Home Health will remove your drain (if you have one) on post op day #2 and change your dressing on post op day #3 and day #10, give them that special dressing we sent home with you.  *Continue incentive spirometry at least every 2 hours while awake.  *Continue white stockings remove 2 times a day for 1 hour and replace.   *Continue ice pack to hip    Discharge instructions reviewed with patient; copy given to patient. Patient voiced understanding regarding:meds, appt., signs and symptoms to report to physician.     Verified with Mary Schumacher RN and patient that patient has the following for discharge: 2 jennifer hoses, incentive spirometer,nozin, rolling walker, toilet riser at home; dressing change, discharge meds, icepacks, abduction pillow, waffle overlay, and implant card.

## 2024-06-19 NOTE — HPI
Patient is a very pleasant 73-year-old  female with end-stage arthritis of her right hip.  Patient had prior hip replacement on the left.  She was admitted today for right total hip replacement by Dr. Young.  The patient has had an unremarkable perioperative course.  However she is still having numbness and weakness of her right leg and was not able to begin physical therapy after surgery.  She has a relatively benign past medical history notable for hypertension.  Past medical surgical and family history reviewed.  She does continue to smoke a quarter of a pack of cigarettes per day.  Medicines and allergies reviewed.

## 2024-06-19 NOTE — DISCHARGE SUMMARY
Department of Orthopedic Surgery  Discharge Note    Admit Date: 6/18/2024  Discharge Date and Time: 6/19/2024   Attending Physician: He Young MD   Discharge Provider: Jenna Torres    Pre-op Diagnosis: Arthritis of right hip [M16.11]  Arthritis of both hips [M16.0]  Procedure:  Robotic total hip arthroplasty right  Final Diagnosis:     Disposition: [facility]-Health Care [facility]  Discharged Condition: good    Hospital Course:   Patient came in on 6/18/2024 and underwent robotic total hip arthroplasty right without complications. POD1 patient resting comfortably in bed eating breakfast.  She has no complaints this morning.  Okay to discharge home with home health today.  Hemovac removed.  She will be on Eliquis 2.5 mg b.i.d. for 30 days postoperatively, she will also go home with Percocet 10/325 p.r.n. for pain.  Okay to discontinue staples at 2 weeks postop.  Follow up with Dr. Young in 3-4 weeks, sooner as needed.    Physical Exam:  Right hip with surgical dressing clean dry and intact, good range of motion with dorsiflexion and plantar flexion of the foot, palpable dorsalis pedis pulse, neurovascularly intact  Surgical incision is clean,dry,intact with minimal/expected erythema and swelling    Discharge Instructions:   Discharge Procedure Orders (must include Diet, Follow-up, Activity)   Diet general     Change dressing (specify)   [facility]  Change dressing post op day #3. Clean with Chloraprep, apply Aquacel AG dressing to incision. Apply Mepilex dressing to hemovac removal site. Contact physician if any wound drainage noted.  Continue DINORA hose on lower extremities, may remove twice day for one hour then replace. Dc staples in 2 weeks from surgery and steristrip incision.  physical therapy daily x 5 days then 3 x week     Call MD for:  temperature >100.4     Call MD for:  redness, tenderness, or signs of infection (pain, swelling, redness, odor or green/yellow discharge  around incision site)     Shower on day dressing removed (No bath)        Medications:     Medication List        START taking these medications      apixaban 2.5 mg Tab  Commonly known as: ELIQUIS  Take 1 tablet (2.5 mg total) by mouth 2 (two) times daily.     oxyCODONE-acetaminophen  mg per tablet  Commonly known as: PERCOCET  Take 1 tablet by mouth every 6 (six) hours as needed for Pain.            CONTINUE taking these medications      amLODIPine 5 MG tablet  Commonly known as: NORVASC  Take 1 tablet (5 mg total) by mouth once daily.     chlorthalidone 25 MG Tab  Commonly known as: HYGROTEN  Take 1 tablet (25 mg total) by mouth once daily.            STOP taking these medications      ibandronate 150 mg tablet  Commonly known as: BONIVA     sulfamethoxazole-trimethoprim 800-160mg 800-160 mg Tab  Commonly known as: BACTRIM DS               Where to Get Your Medications        These medications were sent to [facility]  [address] [place] [id number]      Hours: Mon-Fri, 8:30a-5:00p Phone: [phone]   apixaban 2.5 mg Tab  oxyCODONE-acetaminophen  mg per tablet           Follow up/Patient Instructions:     Follow-up Information       He Young MD Follow up in 3 week(s).    Specialty: Orthopedic Surgery  Contact information:  [address] [place] [address]  He Young Md, Orthopedic & Sports Medicine, [facility] [address]  [phone]

## 2024-06-19 NOTE — PT/OT/SLP EVAL
Physical Therapy Evaluation and Treatment    Patient Name: Brandee Contreras   MRN: [id number]  Recent Surgery: Procedure(s) (LRB):  ROBOTIC ARTHROPLASTY,HIP (Right) 1 Day Post-Op    Recommendations:     Discharge Recommendations: Moderate Intensity Therapy (moderate to low intensity)   Discharge Equipment Recommendations: none   Barriers to discharge: Increased level of assist and Ongoing medical treatment    Assessment:     Brandee Contreras is a 73 y.o. female admitted with a medical diagnosis of Arthritis of both hips. She presents with the following impairments/functional limitations: weakness, impaired endurance, gait instability, impaired balance, impaired functional mobility, decreased lower extremity function, decreased safety awareness, pain, impaired coordination, orthopedic precautions. Pt had L THR in Nov 2023. Pt has all needed equipment and plans to return home with family and C.    Rehab Prognosis: Good; patient would benefit from acute PT services to address these deficits and reach maximum level of function.    Plan:     During this hospitalization, patient to be seen BID (5x/wk; daily 2x/wk) to address the above listed problems via gait training, therapeutic activities, therapeutic exercises, neuromuscular re-education    Plan of Care Expires: 07/19/24    Subjective     Chief Complaint: R BRYAN   Patient Comments/Goals: agreeable  Pain/Comfort:  Pain Rating 1: 0/10 (at rest before movement)  Location - Side 1: Right  Location 1: hip  Pain Addressed 1: Pre-medicate for activity, Reposition, Distraction  Pain Rating Post-Intervention 1: 9/10    Social History:  Living Environment: Patient lives with their mother and sister in a single story home with ramped  Prior Level of Function: Prior to admission, patient was modified independent and driving and retired  Equipment Used at Home: cane, straight  DME owned (not currently used):  scooter, hip kit, rolling walker, rollator, bedside commode, bath  bench, and wheelchair  Assistance Upon Discharge: family    Objective:     Communicated with HELGA Solorzano LPN prior to session. Patient found HOB elevated with hip abduction pillow, SCD, peripheral IV upon PT entry to room.    General Precautions: Standard, fall   Orthopedic Precautions: RLE posterior precautions   Braces: Hip abduction brace    Respiratory Status: Room air    Exams:  RLE ROM: WFL except hip limited by pain  RLE Strength: Deficits: 2+/5  LLE ROM: WFL  LLE Strength: WFL  Cognitive: Patient is oriented to Person, Place, Time, Situation  Sensation:    -       Intact    Functional Mobility:  Gait belt applied - Yes  Bed Mobility  Supine to Sit: maximal assistance for LE management, trunk management, and due to pain  Transfers  Sit to Stand: minimum assistance with rolling walker and with cues for hand placement, foot placement, and weight bearing precautions  Gait  Patient ambulated 10ft with rolling walker and minimum assistance. Patient required cues for heel strike, position in walker, sequencing, step through gait pattern, stride length, upright posture, and weight bearing status to increase independence and safety. Patient required cues ~ 75% of the time.  Balance  Sitting: FAIR+: Maintains balance through MINIMAL excursions of active trunk motion  Standing: POOR+: Needs MIN (minimal ) assist during gait      Therapeutic Activities and Exercises:  Patient educated on role of acute care PT and PT POC, safety while in hospital including calling nurse for mobility, and call light usage.  Educated about weightbearing precautions and provided cuing for adherence as appropriate during session.  Educated about importance of OOB mobility and remaining up in chair most of the day.  Edcuated about pursed lip breathing technique and cued for use with mobility.  Pt educated on PT role/POC.   Importance of OOB activity with staff assistance.  Importance of sitting up in the chair throughout the day as tolerated,  especially for meals   Safety during functional t/f and mobility with use of rolling walker PWB to WBAT R LE  White board updated   All questions/concerns answered within PT scope of practice    Pt educated in posterior hip precautions:   no bending/flexing surgical hip  >  90 degrees  2.   no crossing of surgical leg beyond midline  3.   no internal rotation of surgical leg beyond neutral (don't twist body toward surgical leg).      AM-PAC 6 CLICK MOBILITY  Total Score:14    Patient left up in chair with all lines intact, call button in reach, and OTR present.    GOALS:   Multidisciplinary Problems       Physical Therapy Goals          Problem: Physical Therapy    Goal Priority Disciplines Outcome Goal Variances Interventions   Physical Therapy Goal     PT, PT/OT Progressing     Description: Short Term Goals to be met by: 7/3/24    Patient will increase functional independence with mobility by performin. Supine to sit with Modified Dyer  2. Sit to stand transfer with Modified Dyer  3. Bed to chair transfer with Modified Dyer using Rolling Walker, PWB to WBAT  R LE  4. Gait  x 100 feet with Modified Dyer using Rolling Walker,  PWB to WBAT R LE.   5. Lower extremity exercise program x30 reps per handout, with assistance as needed  6. Verbalize/demo 3/3 hip precautions    Long Term Goals to be met by: 24     1. Pt with regain full independent functional mobility to return to prior activities of daily living                        History:     Past Medical History:   Diagnosis Date    Arthritis     Hypertension     Nicotine dependence        Past Surgical History:   Procedure Laterality Date    JOINT REPLACEMENT Left 2023    [facility]     Dr. He Young    ROBOTIC ARTHROPLASTY, HIP Left 2023    Procedure: ROBOTIC ARTHROPLASTY,HIP;  Surgeon: He Young MD;  Location: [facility];  Service: Orthopedics;  Laterality: Left;    ROBOTIC ARTHROPLASTY, HIP Right  6/18/2024    Procedure: ROBOTIC ARTHROPLASTY,HIP;  Surgeon: He Young MD;  Location: [facility];  Service: Orthopedics;  Laterality: Right;    VAGINAL DELIVERY         Time Tracking:     PT Received On: 06/19/24  PT Start Time: 0925  PT Stop Time: 0949  PT Total Time (min): 24 min     Billable Minutes: Evaluation 15 and Therapeutic Activity 9    6/19/2024

## 2024-06-19 NOTE — CARE UPDATE
Patient awake alert without any new complaints.  Her drain has been removed.  She moves her toes dorsiflexion plantar flexion on right lower extremity.  Tolerating p.o. pain meds.  Start Eliquis for DVT prophylaxis.  Home health for PT.  DC staples in 2 weeks.  Follow-up Dr. Young in 3-4 weeks.  Percocet for pain.

## 2024-06-19 NOTE — ASSESSMENT & PLAN NOTE
Now status post right total hip replacement.  Past history of left hip replacement.  Postoperative care as per Orthopedics.  Apixaban for DVT prophylaxis  
Chronic, controlled. Latest blood pressure and vitals reviewed-     Temp:  [97.4 °F (36.3 °C)-98.2 °F (36.8 °C)]   Pulse:  [63-80]   Resp:  [0-18]   BP: (102-169)/(47-76)   SpO2:  [96 %-100 %] .   Home meds for hypertension were reviewed and noted below.   Hypertension Medications               amLODIPine (NORVASC) 5 MG tablet Take 1 tablet (5 mg total) by mouth once daily.    chlorthalidone (HYGROTEN) 25 MG Tab Take 1 tablet (25 mg total) by mouth once daily.            While in the hospital, will manage blood pressure as follows; Continue home antihypertensive regimen    Will utilize p.r.n. blood pressure medication only if patient's blood pressure greater than 180/110 and she develops symptoms such as worsening chest pain or shortness of breath.  
No

## 2024-06-19 NOTE — DISCHARGE INSTRUCTIONS
Diet general          Change dressing (specify)   Order Comments: Home Health / Swingbed nurse:  Change dressing post op day #3. Clean with Chloraprep, apply Aquacel AG dressing to incision. Apply Mepilex dressing to hemovac removal site. Contact physician if any wound drainage noted.  Continue DINORA hose on lower extremities, may remove twice day for one hour then replace. Dc staples in 2 weeks from surgery and steristrip incision.  physical therapy daily x 5 days then 3 x week      Call MD for:  temperature >100.4      Call MD for:  redness, tenderness, or signs of infection (pain, swelling, redness, odor or green/yellow discharge around incision site)      Shower on day dressing removed (No bath)     *Keep dressing dry and intact, do not remove dressing, if dressing becomes wet or bloody notify home health staff.  Swingbed/Home Health will remove your drain (if you have one) on post op day #2 and change your dressing on post op day #3 and day #10, give them that special dressing we sent home with you.  *Continue incentive spirometry at least every 2 hours while awake.  *Continue white stockings remove 2 times a day for 1 hour and replace.   *Continue ice pack to hip  *Take laxative of choice to have a bowel movement at least by tomorrow and then every other day.  *Increase fluids by mouth.  *Staples will be removed by home health/swingbed staff in 2 weeks from surgery prior to follow up appointment  *Continue pillows between legs for abduction  *Notify swingbed/home health staff if any concerns.

## 2024-06-19 NOTE — PLAN OF CARE
Problem: Physical Therapy  Goal: Physical Therapy Goal  Description: Short Term Goals to be met by: 7/3/24    Patient will increase functional independence with mobility by performin. Supine to sit with Modified [place]  2. Sit to stand transfer with Modified [place]  3. Bed to chair transfer with Modified [place] using Rolling Walker, PWB to WBAT  R LE  4. Gait  x 100 feet with Modified [place] using Rolling Walker,  PWB to WBAT R LE.   5. Lower extremity exercise program x30 reps per handout, with assistance as needed  6. Verbalize/demo 3/3 hip precautions    Long Term Goals to be met by: 24     1. Pt with regain full independent functional mobility to return to prior activities of daily living   Outcome: Progressing     PT eval completed. Please see eval note for details.

## 2024-06-19 NOTE — PT/OT/SLP EVAL
Occupational Therapy   Evaluation and Discharge Note    Name: Brandee Contreras  MRN: [id number]  Admitting Diagnosis: Arthritis of both hips  Recent Surgery: Procedure(s) (LRB):  ROBOTIC ARTHROPLASTY,HIP (Right) 1 Day Post-Op    Recommendations:     Discharge Recommendations:    Discharge Equipment Recommendations: none  Barriers to discharge:  None    Assessment:     Brandee Contreras is a 73 y.o. female with a medical diagnosis of Arthritis of both hips. At this time, patient is functioning at their prior level of function and does not require further acute OT services.     Plan:     During this hospitalization, patient does not require further acute OT services.  Please re-consult if situation changes.    Plan of Care Reviewed with: patient    Subjective     Chief Complaint: Pt c/o L hip P! Upon moving  Patient/Family Comments/goals: return home    Occupational Profile:  Living Environment: Pt lives alone in single story home  Previous level of function: I will ADLs  Roles and Routines: homemaker, self care  Equipment Used at home: walker, rolling, bedside commode, shower chair  Assistance upon Discharge: Pt will assistance from family    Pain/Comfort:  Pain Rating 1: 7/10  Location - Side 1: Left  Location 1: hip  Pain Addressed 1: Pre-medicate for activity, Reposition, Cessation of Activity    Patients cultural, spiritual, Worship conflicts given the current situation:      Objective:     Communicated with: MIMI Owens prior to session.  Patient found supine with peripheral iV  upon OT entry to room.    General Precautions: Standard, fall  Orthopedic Precautions:    Braces:    Respiratory Status: Room air     Occupational Performance:    Bed Mobility:    Patient completed Supine to Sit with moderate assistance    Functional Mobility/Transfers:  Patient completed Sit <> Stand Transfer with moderate assistance  with  rolling walker   Patient completed Bed <> Chair Transfer using Step Transfer technique with  contact guard assistance with rolling walker  Functional Mobility: Pt ambulated 7-8 steps bed>chair    Activities of Daily Living:  Upper Body Dressing: modified independence Pt donned bra and shirt I'ly after setup  Lower Body Dressing: maximal assistance Pt required Max A to thread LLE into undergarment and pants. Pt was able to  thread RLE and assist with pulling clothing up     Cognitive/Visual Perceptual:      Physical Exam:      AMPAC 6 Click ADL:  AMPAC Total Score: 18    Treatment & Education:  Pt educated on using AE to assist with dressing  Pt educated on posterior hip precautions      Patient left up in chair with all lines intact and call button in reach    GOALS:   Multidisciplinary Problems       Occupational Therapy Goals       Not on file                    History:     Past Medical History:   Diagnosis Date    Arthritis     Hypertension     Nicotine dependence          Past Surgical History:   Procedure Laterality Date    JOINT REPLACEMENT Left 11/14/2023    LTHR     Dr. He Young    ROBOTIC ARTHROPLASTY, HIP Left 11/14/2023    Procedure: ROBOTIC ARTHROPLASTY,HIP;  Surgeon: He Young MD;  Location: [facility];  Service: Orthopedics;  Laterality: Left;    ROBOTIC ARTHROPLASTY, HIP Right 6/18/2024    Procedure: ROBOTIC ARTHROPLASTY,HIP;  Surgeon: He Young MD;  Location: [facility];  Service: Orthopedics;  Laterality: Right;    VAGINAL DELIVERY         Time Tracking:     OT Date of Treatment:    OT Start Time: 0935  OT Stop Time: 1011  OT Total Time (min): 36 min    Billable Minutes:Evaluation 16  Self Care/Home Management 20    6/19/2024

## 2024-06-19 NOTE — PT/OT/SLP PROGRESS
Physical Therapy Instructions:    Posterior hip precautions to be followed for three months:  no bending/flexing surgical hip  >  90 degrees  2.   no crossing of surgical leg beyond midline  3.   no internal rotation of surgical leg beyond neutral (don't twist body toward surgical leg).        Ankle Pump        Bend ankles up and down, alternating feet.  Repeat 30 times. Do 2 sessions per day.       Quad Set        Slowly tighten muscles on thigh of right straight leg while counting out loud to 5 .   Repeat 30 times. Do 2 sessions per day.           Gluteal Sets        Squeeze pelvic floor and hold. Tighten bottom. Repeat 30 times. Do 2 sessions a day.           Heel Slide        Bend knee and pull right heel toward buttocks. Straighten out the leg.  Repeat 30 times.  Do 2 sessions per day.           Hip Abduction / Adduction: with Extended Knee (Supine)        Bring right leg out to side and return to midline position. Keep knee straight.  Repeat 30 times. Do 2 sessions per day.            Straight Leg Raise        Bend left leg. Raise right leg 8-12 inches with knee locked. Exhale and tighten thigh muscles while raising leg.   Repeat 30 times. Do 2 sessions per day.           Bridging        Lie on back with feet shoulder width apart. Lift hips toward the ceiling. Hold 5 seconds.  Repeat 30 times. Do 2 sessions per day.         KNEE: Extension, Long Arc Quads - Sitting        Raise right foot until knee is straight. Return foot to the floor.  Repeat 30 times. Do 2 sessions per day.       Copyright © [employer]. All rights reserved.

## 2024-06-19 NOTE — PT/OT/SLP PROGRESS
Physical Therapy Treatment    Patient Name:  Brandee Contreras   MRN:  [id number]    Recommendations:     Discharge Recommendations: Moderate Intensity Therapy (moderate to low intensity)  Discharge Equipment Recommendations: none  Barriers to discharge:  ongoing medical care    Assessment:     Brandee Contreras is a 73 y.o. female admitted with a medical diagnosis of Arthritis of both hips.  She presents with the following impairments/functional limitations: weakness, impaired endurance, gait instability, impaired balance, impaired functional mobility, decreased lower extremity function, decreased safety awareness, pain, impaired coordination, orthopedic precautions. Pt demo better pain management and progression of gait this PM but still req increased time for all activity. Pt planned to d/c home today.     Rehab Prognosis: Good; patient would benefit from acute skilled PT services to address these deficits and reach maximum level of function.    Recent Surgery: Procedure(s) (LRB):  ROBOTIC ARTHROPLASTY,HIP (Right) 1 Day Post-Op    Plan:     During this hospitalization, patient to be seen BID (5x/wk; daily 2x/wk) to address the identified rehab impairments via gait training, therapeutic activities, therapeutic exercises, neuromuscular re-education and progress toward the following goals:    Plan of Care Expires:  07/19/24    Subjective     Chief Complaint: R BRYAN  Patient/Family Comments/goals: agreeable  Pain/Comfort:  Pain Rating 1: 0/10 (at rest before movement)  Location - Side 1: Right  Location 1: hip  Pain Addressed 1: Pre-medicate for activity, Reposition, Distraction  Pain Rating Post-Intervention 1: 9/10      Objective:     Communicated with nursing prior to session.  Patient found up in chair with hip abduction pillow, SCD, peripheral IV upon PT entry to room.     General Precautions: Standard, fall  Orthopedic Precautions: RLE posterior precautions  Braces: Hip abduction brace  Respiratory Status: Room  air     Functional Mobility:  Transfers:     Sit to Stand:  minimum assistance with rolling walker  Gait: 48ft with RW, Min A with heavy cues for advancement of surgical leg  Balance: fair-      AM-PAC 6 CLICK MOBILITY  Turning over in bed (including adjusting bedclothes, sheets and blankets)?: 2  Sitting down on and standing up from a chair with arms (e.g., wheelchair, bedside commode, etc.): 3  Moving from lying on back to sitting on the side of the bed?: 2  Moving to and from a bed to a chair (including a wheelchair)?: 3  Need to walk in hospital room?: 3  Climbing 3-5 steps with a railing?: 1  Basic Mobility Total Score: 14       Treatment & Education:  Bilateral lower extremity exercise x 20 reps: ankle pumps, heel slides, hip abduction/adduction, Long arc quads, and Marching with verbal cues for sequencing and safety and tactile cues     Patient left up in chair with all lines intact, call button in reach, and RN notified..    GOALS:   Multidisciplinary Problems       Physical Therapy Goals          Problem: Physical Therapy    Goal Priority Disciplines Outcome Goal Variances Interventions   Physical Therapy Goal     PT, PT/OT Progressing     Description: Short Term Goals to be met by: 7/3/24    Patient will increase functional independence with mobility by performin. Supine to sit with Modified [place]  2. Sit to stand transfer with Modified [place]  3. Bed to chair transfer with Modified [place] using Rolling Walker, PWB to WBAT  R LE  4. Gait  x 100 feet with Modified [place] using Rolling Walker,  PWB to WBAT R LE.   5. Lower extremity exercise program x30 reps per handout, with assistance as needed  6. Verbalize/demo 3/3 hip precautions    Long Term Goals to be met by: 24     1. Pt with regain full independent functional mobility to return to prior activities of daily living                        Time Tracking:     PT Received On: 24  PT Start Time: 1357     PT Stop  Time: 1433  PT Total Time (min): 36 min     Billable Minutes: Gait Training 15 and Therapeutic Exercise 15    Treatment Type: Evaluation  PT/PTA: PT           06/19/2024

## 2024-06-20 ENCOUNTER — TELEPHONE (OUTPATIENT)
Dept: CARDIOLOGY | Facility: HOSPITAL | Age: 74
End: 2024-06-20
Payer: MEDICARE

## 2024-06-20 PROCEDURE — G0180 MD CERTIFICATION HHA PATIENT: HCPCS | Mod: ,,, | Performed by: ORTHOPAEDIC SURGERY

## 2024-06-20 RX ORDER — ROPIVACAINE HYDROCHLORIDE 7.5 MG/ML
INJECTION, SOLUTION EPIDURAL; PERINEURAL
Status: DISCONTINUED | OUTPATIENT
Start: 2024-06-18 | End: 2024-06-20

## 2024-06-20 NOTE — ANESTHESIA POSTPROCEDURE EVALUATION
Anesthesia Post Evaluation    Patient: Brandee Contreras    Procedure(s) Performed: Procedure(s) (LRB):  ROBOTIC ARTHROPLASTY,HIP (Right)    Final Anesthesia Type: general      Patient location during evaluation: PACU  Patient participation: Yes- Able to Participate  Level of consciousness: awake and alert  Post-procedure vital signs: reviewed and stable  Pain management: adequate  Airway patency: patent  DESHAWN mitigation strategies: Multimodal analgesia and Use of major conduction anesthesia (spinal/epidural) or peripheral nerve block  PONV status at discharge: No PONV  Anesthetic complications: no      Cardiovascular status: blood pressure returned to baseline  Respiratory status: unassisted  Hydration status: euvolemic  Follow-up not needed.              Vitals Value Taken Time   BP 97/39 06/18/24 1502   Temp 36.4 °C (97.5 °F) 06/18/24 1210   Pulse 79 06/18/24 1510   Resp 0 06/18/24 1223   SpO2 99 % 06/18/24 1510   Vitals shown include unfiled device data.      Event Time   Out of Recovery 12:10:00         Pain/Rohit Score: Pain Rating Prior to Med Admin: 8 (6/19/2024 12:11 PM)  Pain Rating Post Med Admin: 2 (6/19/2024  7:18 AM)  Rohit Score: 10 (6/19/2024  7:18 AM)

## 2024-06-20 NOTE — ANESTHESIA PROCEDURE NOTES
Peripheral Block    Patient location during procedure: OR   Block not for primary anesthetic.  Reason for block: at surgeon's request and post-op pain management   Post-op Pain Location: right hip   Start time: 6/18/2024 8:10 AM  Timeout: 6/18/2024 8:10 AM   End time: 6/18/2024 8:10 AM    Staffing  Authorizing Provider: Junaid Madrid MD  Performing Provider: Junaid Madrid MD    Staffing  Performed by: Junaid Madrid MD  Authorized by: Junaid Madrid MD    Preanesthetic Checklist  Completed: patient identified, IV checked, site marked, risks and benefits discussed, surgical consent, monitors and equipment checked, pre-op evaluation and timeout performed  Peripheral Block  Patient position: supine  Prep: ChloraPrep  Patient monitoring: heart rate, cardiac monitor, continuous pulse ox, continuous capnometry and frequent blood pressure checks  Block type: fascia iliaca  Laterality: right  Injection technique: single shot  Needle  Needle type: Stimuplex   Needle gauge: 20 G  Needle length: 4 in  Needle localization: anatomical landmarks and ultrasound guidance   -ultrasound image captured on disc.  Assessment  Injection assessment: negative aspiration, local visualized surrounding nerve and negative parasthesia  Heart rate change: no  Slow fractionated injection: yes  Pain Tolerance: comfortable throughout block  Medications:    Medications: ROPIvacaine (NAROPIN) injection 0.75% - Perineural   30 mL - 6/18/2024 8:10:00 AM

## 2024-06-20 NOTE — ANESTHESIA PROCEDURE NOTES
Spinal    Diagnosis: total joint replacement  Patient location during procedure: OR  Start time: 6/18/2024 8:00 AM  Timeout: 6/18/2024 8:00 AM  End time: 6/18/2024 8:00 AM    Staffing  Authorizing Provider: Junaid Madrid MD  Performing Provider: Junaid Madrid MD    Staffing  Performed by: Junaid Madrid MD  Authorized by: Junaid Madrid MD    Preanesthetic Checklist  Completed: patient identified, IV checked, risks and benefits discussed, surgical consent, monitors and equipment checked, pre-op evaluation and timeout performed  Spinal Block  Patient position: sitting  Prep: Betadine  Patient monitoring: heart rate, continuous pulse ox, continuous capnometry and frequent blood pressure checks  Approach: midline  Location: L3-4  Injection technique: single shot  CSF Fluid: clear free-flowing CSF  Needle  Needle type: Quincke   Needle gauge: 22 G  Needle length: 4 in  Needle localization: anatomical landmarks  Assessment  Sensory level: T8   Dermatomal levels determined by alcohol wipe  Ease of block: easy  Patient's tolerance of the procedure: comfortable throughout block and no complaints

## 2024-06-24 ENCOUNTER — TELEPHONE (OUTPATIENT)
Dept: CARDIOLOGY | Facility: HOSPITAL | Age: 74
End: 2024-06-24
Payer: MEDICARE

## 2024-06-27 DIAGNOSIS — Z47.89 ENCOUNTER FOR ORTHOPEDIC FOLLOW-UP CARE: Primary | ICD-10-CM

## 2024-06-27 NOTE — TELEPHONE ENCOUNTER
Left message for patient that Dr. Young is in surgery all day today and it would be tomorrow before I would see him to ask him about her pain medicine.

## 2024-06-27 NOTE — TELEPHONE ENCOUNTER
----- Message from Kamala Guillen sent at 6/27/2024 11:07 AM CDT -----  Pt is asking if refill on pain med can be sent in to [facility].  Who Called: Brandee Contreras    Caller is requesting assistance/information from provider's office.      Preferred Method of Contact: Phone Call  Patient's Preferred Phone Number on File: [phone]   Best Call Back Number, if different:  Additional Information:

## 2024-06-28 RX ORDER — HYDROCODONE BITARTRATE AND ACETAMINOPHEN 10; 325 MG/1; MG/1
1 TABLET ORAL EVERY 6 HOURS PRN
Qty: 28 TABLET | Refills: 0 | Status: SHIPPED | OUTPATIENT
Start: 2024-06-28

## 2024-07-09 DIAGNOSIS — Z47.89 ENCOUNTER FOR ORTHOPEDIC FOLLOW-UP CARE: Primary | ICD-10-CM

## 2024-07-10 ENCOUNTER — OFFICE VISIT (OUTPATIENT)
Dept: ORTHOPEDICS | Facility: CLINIC | Age: 74
End: 2024-07-10
Payer: MEDICARE

## 2024-07-10 ENCOUNTER — HOSPITAL ENCOUNTER (OUTPATIENT)
Dept: RADIOLOGY | Facility: HOSPITAL | Age: 74
Discharge: HOME | End: 2024-07-10
Attending: ORTHOPAEDIC SURGERY
Payer: MEDICARE

## 2024-07-10 DIAGNOSIS — Z47.89 ENCOUNTER FOR ORTHOPEDIC FOLLOW-UP CARE: Primary | ICD-10-CM

## 2024-07-10 DIAGNOSIS — Z47.89 ENCOUNTER FOR ORTHOPEDIC FOLLOW-UP CARE: ICD-10-CM

## 2024-07-10 PROCEDURE — 73502 X-RAY EXAM HIP UNI 2-3 VIEWS: CPT | Mod: 26,RT,, | Performed by: ORTHOPAEDIC SURGERY

## 2024-07-10 PROCEDURE — 73502 X-RAY EXAM HIP UNI 2-3 VIEWS: CPT | Mod: TC,RT

## 2024-07-10 PROCEDURE — 1159F MED LIST DOCD IN RCRD: CPT | Mod: CPTII,,, | Performed by: ORTHOPAEDIC SURGERY

## 2024-07-10 PROCEDURE — 99024 POSTOP FOLLOW-UP VISIT: CPT | Mod: ,,, | Performed by: ORTHOPAEDIC SURGERY

## 2024-07-10 PROCEDURE — 99213 OFFICE O/P EST LOW 20 MIN: CPT | Mod: PBBFAC,25 | Performed by: ORTHOPAEDIC SURGERY

## 2024-07-10 PROCEDURE — 99999 PR PBB SHADOW E&M-EST. PATIENT-LVL III: CPT | Mod: PBBFAC,,, | Performed by: ORTHOPAEDIC SURGERY

## 2024-07-10 NOTE — PROGRESS NOTES
Radiology Interpretation        Patient Name: Brandee Contreras  Date: 7/10/2024  YOB: 1950  MRN# [id number]        ORDERING DIAGNOSIS:    Encounter Diagnosis   Name Primary?    Encounter for orthopedic follow-up care Yes        AP pelvis skeletally mature individual there is normal mineralization there is a total hip arthroplasty in place bilaterally no loosening no shift alignment no bony lesions impression bilateral total hip arthroplasties in place               He Young MD

## 2024-07-10 NOTE — PROGRESS NOTES
Radiology Interpretation        Patient Name: Brandee Contreras  Date: 7/10/2024  YOB: 1950  MRN# [id number]        ORDERING DIAGNOSIS:    Encounter Diagnosis   Name Primary?    Encounter for orthopedic follow-up care Yes        AP lateral right hip skeletally mature individual total hip arthroplasty in place no loosening of the components no shift alignment no bony lesions impression total hip arthroplasty in place right hip no loosening             He Young MD

## 2024-07-10 NOTE — PROGRESS NOTES
Patient is here for follow-up of right total hip arthroplasty.  She is doing well.  Let weightbear as tolerates.  Her x-rays show no loosening components.  She has walking with a cane.  Wounds well healed.  I will follow back up in 6 weeks.  Neurovascularly intact distally.

## 2024-07-17 ENCOUNTER — EXTERNAL HOME HEALTH (OUTPATIENT)
Dept: HOME HEALTH SERVICES | Facility: HOSPITAL | Age: 74
End: 2024-07-17
Payer: MEDICARE

## 2024-09-16 ENCOUNTER — OFFICE VISIT (OUTPATIENT)
Dept: ORTHOPEDICS | Facility: CLINIC | Age: 74
End: 2024-09-16
Payer: MEDICARE

## 2024-09-16 ENCOUNTER — HOSPITAL ENCOUNTER (OUTPATIENT)
Dept: RADIOLOGY | Facility: HOSPITAL | Age: 74
Discharge: HOME | End: 2024-09-16
Attending: ORTHOPAEDIC SURGERY
Payer: MEDICARE

## 2024-09-16 DIAGNOSIS — Z47.89 ENCOUNTER FOR ORTHOPEDIC FOLLOW-UP CARE: Primary | ICD-10-CM

## 2024-09-16 DIAGNOSIS — Z47.89 ENCOUNTER FOR ORTHOPEDIC FOLLOW-UP CARE: ICD-10-CM

## 2024-09-16 PROCEDURE — 99999 PR PBB SHADOW E&M-EST. PATIENT-LVL III: CPT | Mod: PBBFAC,,, | Performed by: ORTHOPAEDIC SURGERY

## 2024-09-16 PROCEDURE — 73502 X-RAY EXAM HIP UNI 2-3 VIEWS: CPT | Mod: TC,RT

## 2024-09-16 PROCEDURE — 1159F MED LIST DOCD IN RCRD: CPT | Mod: CPTII,,, | Performed by: ORTHOPAEDIC SURGERY

## 2024-09-16 PROCEDURE — 99024 POSTOP FOLLOW-UP VISIT: CPT | Mod: ,,, | Performed by: ORTHOPAEDIC SURGERY

## 2024-09-16 PROCEDURE — 73502 X-RAY EXAM HIP UNI 2-3 VIEWS: CPT | Mod: 26,RT,, | Performed by: ORTHOPAEDIC SURGERY

## 2024-09-16 PROCEDURE — 99213 OFFICE O/P EST LOW 20 MIN: CPT | Mod: PBBFAC,25 | Performed by: ORTHOPAEDIC SURGERY

## 2024-09-16 NOTE — PROGRESS NOTES
Radiology Interpretation        Patient Name: Brandee Contreras  Date: 9/16/2024  YOB: 1950  MRN# [id number]        ORDERING DIAGNOSIS:    Encounter Diagnosis   Name Primary?    Encounter for orthopedic follow-up care Yes        AP lateral right hip skeletally mature individual total hip arthroplasty in place no loosening fractures or subluxations impression total hip arthroplasty in place right hip no loosening               He Young MD

## 2024-09-16 NOTE — PROGRESS NOTES
Radiology Interpretation        Patient Name: Brandee Contreras  Date: 9/16/2024  YOB: 1950  MRN# [id number]        ORDERING DIAGNOSIS:    Encounter Diagnosis   Name Primary?    Encounter for orthopedic follow-up care Yes        AP pelvis skeletally mature individual there is normal mineralization total knee arthroplasties in place bilaterally no loosening of the components no shift alignment impression bilateral total hip arthroplasties in place               He Young MD

## 2024-09-16 NOTE — PROGRESS NOTES
Patient is here for follow-up of her right total hip arthroplasty she has had both hips done.  This time she has no loosening of the components she is walking well curing her cane no instability of the hips noted wounds are well healed let her weightbear as tolerates I will follow back up in 3 months.

## 2024-12-09 DIAGNOSIS — I10 HYPERTENSION, UNSPECIFIED TYPE: ICD-10-CM

## 2024-12-09 RX ORDER — CHLORTHALIDONE 25 MG/1
25 TABLET ORAL DAILY
Qty: 90 TABLET | Refills: 1 | OUTPATIENT
Start: 2024-12-09

## 2024-12-09 RX ORDER — AMLODIPINE BESYLATE 5 MG/1
5 TABLET ORAL DAILY
Qty: 90 TABLET | Refills: 1 | OUTPATIENT
Start: 2024-12-09

## 2024-12-16 ENCOUNTER — OFFICE VISIT (OUTPATIENT)
Dept: ORTHOPEDICS | Facility: CLINIC | Age: 74
End: 2024-12-16
Payer: MEDICARE

## 2024-12-16 ENCOUNTER — TELEPHONE (OUTPATIENT)
Dept: FAMILY MEDICINE | Facility: CLINIC | Age: 74
End: 2024-12-16
Payer: MEDICARE

## 2024-12-16 ENCOUNTER — HOSPITAL ENCOUNTER (OUTPATIENT)
Dept: RADIOLOGY | Facility: HOSPITAL | Age: 74
Discharge: HOME | End: 2024-12-16
Attending: ORTHOPAEDIC SURGERY
Payer: MEDICARE

## 2024-12-16 VITALS
DIASTOLIC BLOOD PRESSURE: 67 MMHG | HEART RATE: 86 BPM | BODY MASS INDEX: 26.49 KG/M2 | WEIGHT: 155.13 LBS | OXYGEN SATURATION: 100 % | HEIGHT: 64 IN | SYSTOLIC BLOOD PRESSURE: 140 MMHG

## 2024-12-16 DIAGNOSIS — M65.30 TRIGGER FINGER OF LEFT HAND, UNSPECIFIED FINGER: ICD-10-CM

## 2024-12-16 DIAGNOSIS — Z47.89 ENCOUNTER FOR ORTHOPEDIC FOLLOW-UP CARE: Primary | ICD-10-CM

## 2024-12-16 DIAGNOSIS — Z96.642 STATUS POST HIP REPLACEMENT, LEFT: ICD-10-CM

## 2024-12-16 DIAGNOSIS — Z47.89 ENCOUNTER FOR ORTHOPEDIC FOLLOW-UP CARE: ICD-10-CM

## 2024-12-16 PROCEDURE — 20550 NJX 1 TENDON SHEATH/LIGAMENT: CPT | Mod: PBBFAC,LT | Performed by: ORTHOPAEDIC SURGERY

## 2024-12-16 PROCEDURE — 99999PBSHW PR PBB SHADOW TECHNICAL ONLY FILED TO HB: Mod: PBBFAC,,,

## 2024-12-16 PROCEDURE — 99213 OFFICE O/P EST LOW 20 MIN: CPT | Mod: PBBFAC,25 | Performed by: ORTHOPAEDIC SURGERY

## 2024-12-16 PROCEDURE — 99999 PR PBB SHADOW E&M-EST. PATIENT-LVL III: CPT | Mod: PBBFAC,,, | Performed by: ORTHOPAEDIC SURGERY

## 2024-12-16 PROCEDURE — 73502 X-RAY EXAM HIP UNI 2-3 VIEWS: CPT | Mod: 26,RT,, | Performed by: RADIOLOGY

## 2024-12-16 PROCEDURE — 73502 X-RAY EXAM HIP UNI 2-3 VIEWS: CPT | Mod: TC,RT

## 2024-12-16 RX ORDER — TRIAMCINOLONE ACETONIDE 40 MG/ML
20 INJECTION, SUSPENSION INTRA-ARTICULAR; INTRAMUSCULAR
Status: DISCONTINUED | OUTPATIENT
Start: 2024-12-16 | End: 2024-12-16 | Stop reason: HOSPADM

## 2024-12-16 RX ORDER — BUPIVACAINE HYDROCHLORIDE 2.5 MG/ML
1 INJECTION, SOLUTION INFILTRATION; PERINEURAL
Status: DISCONTINUED | OUTPATIENT
Start: 2024-12-16 | End: 2024-12-16 | Stop reason: HOSPADM

## 2024-12-16 RX ADMIN — TRIAMCINOLONE ACETONIDE 20 MG: 40 INJECTION, SUSPENSION INTRA-ARTICULAR; INTRAMUSCULAR at 01:12

## 2024-12-16 RX ADMIN — BUPIVACAINE HYDROCHLORIDE 1 ML: 2.5 INJECTION, SOLUTION INFILTRATION; PERINEURAL at 01:12

## 2024-12-16 NOTE — PROCEDURES
Tendon Sheath    Date/Time: 12/16/2024 1:40 PM    Performed by: He Young MD  Authorized by: He Young MD    Consent Done?:  Yes (Verbal)  Indications:  Pain  Location:  Long finger  Site:  L long MCP  Ultrasonic guidance for needle placement?: No    Needle size:  25 G  Approach:  Volar  Medications:  1 mL BUPivacaine 0.25% (2.5 mg/ml) 0.25 % (2.5 mg/mL); 20 mg triamcinolone acetonide 40 mg/mL  Patient tolerance:  Patient tolerated the procedure well with no immediate complications

## 2024-12-16 NOTE — PROGRESS NOTES
Patient is here for follow-up of right hip total hip arthroplasty.  Her x-rays show no loosening.  She is doing well.  Good motion.  Continue weightbear as tolerates I will check her on a yearly basis she is doing well from her hip.  Her left hand she has tenderness over the A1 pulley of her long finger.  She lock down.  She has a trigger finger.  I injected her with 0.5 cc Marcaine 0.5 cc of Kenalog and left long finger.  Let her use her hand as tolerates.  I will follow up for the trigger finger as needed.

## 2024-12-16 NOTE — TELEPHONE ENCOUNTER
----- Message from Jeri sent at 12/16/2024  3:09 PM CST -----  Regarding: Rx refills  Who Called: Brandee Contreras    Refill or New Rx:Refill  RX Name and Strength: amLODIPine (NORVASC) 5 MG tablet 90 tablet 1 6/5/2024 - No    How is the patient currently taking it: Take 1 tablet (5 mg total) by mouth once daily. - Oral      Refill or New Rx:Refill  RX Name and Strength: chlorthalidone (HYGROTEN) 25 MG Tab 90 tablet 1 6/5/2024 - No      How is the patient currently taking it: Take 1 tablet (25 mg total) by mouth once daily. - Oral        Is this a 30 day or 90 day RX:90 days  Local or Mail Order: Local  List of preferred pharmacies on file: [facility]  [address] [place] [id number]  Phone: [phone] Fax: [phone]  Hours: Mon-Fri, 8:30a-5:00p        Preferred Method of Contact: Phone Call  Patient's Preferred Phone Number on File: [phone]     Additional Information: Pt. Is out of Rx and BP is high. She needs her Rx refilled

## 2024-12-16 NOTE — TELEPHONE ENCOUNTER
Called pt and let her know that she hasn't est care with us so we needed to get her an apt scheduled with a provider so that she could get her meds refilled pt will see diaz sifuentes Wednesday at 430

## 2024-12-18 ENCOUNTER — OFFICE VISIT (OUTPATIENT)
Dept: FAMILY MEDICINE | Facility: CLINIC | Age: 74
End: 2024-12-18
Payer: MEDICARE

## 2024-12-18 ENCOUNTER — HOSPITAL ENCOUNTER (OUTPATIENT)
Dept: CARDIOLOGY | Facility: HOSPITAL | Age: 74
Discharge: HOME | End: 2024-12-18
Payer: MEDICARE

## 2024-12-18 VITALS
TEMPERATURE: 98 F | HEIGHT: 64 IN | SYSTOLIC BLOOD PRESSURE: 160 MMHG | OXYGEN SATURATION: 98 % | DIASTOLIC BLOOD PRESSURE: 88 MMHG | BODY MASS INDEX: 26.49 KG/M2 | HEART RATE: 75 BPM | WEIGHT: 155.13 LBS

## 2024-12-18 DIAGNOSIS — R00.2 PALPITATIONS: ICD-10-CM

## 2024-12-18 DIAGNOSIS — I10 HYPERTENSION, UNSPECIFIED TYPE: Primary | ICD-10-CM

## 2024-12-18 DIAGNOSIS — I10 HYPERTENSION, UNSPECIFIED TYPE: ICD-10-CM

## 2024-12-18 PROCEDURE — 3079F DIAST BP 80-89 MM HG: CPT | Mod: ,,,

## 2024-12-18 PROCEDURE — 3077F SYST BP >= 140 MM HG: CPT | Mod: ,,,

## 2024-12-18 PROCEDURE — 1160F RVW MEDS BY RX/DR IN RCRD: CPT | Mod: ,,,

## 2024-12-18 PROCEDURE — 99214 OFFICE O/P EST MOD 30 MIN: CPT | Mod: ,,,

## 2024-12-18 PROCEDURE — 93005 ELECTROCARDIOGRAM TRACING: CPT

## 2024-12-18 PROCEDURE — 1159F MED LIST DOCD IN RCRD: CPT | Mod: ,,,

## 2024-12-18 RX ORDER — CHLORTHALIDONE 25 MG/1
25 TABLET ORAL DAILY
Qty: 90 TABLET | Refills: 1 | Status: SHIPPED | OUTPATIENT
Start: 2024-12-18

## 2024-12-18 RX ORDER — ACETAMINOPHEN 500 MG
1 TABLET ORAL 2 TIMES DAILY
Qty: 1 EACH | Refills: 0 | Status: SHIPPED | OUTPATIENT
Start: 2024-12-18

## 2024-12-18 RX ORDER — IBANDRONATE SODIUM 150 MG/1
150 TABLET, FILM COATED ORAL DAILY
Qty: 1 TABLET | Refills: 11 | Status: CANCELLED | OUTPATIENT
Start: 2024-12-18

## 2024-12-18 RX ORDER — AMLODIPINE BESYLATE 5 MG/1
5 TABLET ORAL DAILY
Qty: 90 TABLET | Refills: 1 | Status: SHIPPED | OUTPATIENT
Start: 2024-12-18

## 2024-12-18 NOTE — PATIENT INSTRUCTIONS
"Controlling Your Blood Pressure Through Lifestyle   The Basics   Written by the doctors and editors at [facility]   What does my lifestyle have to do with my blood pressure? -- The things you do and the foods you eat have a big effect on your blood pressure and your overall health. Following the right lifestyle can:  Lower your blood pressure or keep you from getting high blood pressure in the first place  Reduce your need for blood pressure medicines  Make medicines for high blood pressure work better, if you do take them  Lower the chances that you'll have a heart attack or stroke, or develop kidney disease  Which lifestyle choices will help lower my blood pressure? -- Here's what you can do:  Lose weight (if you are overweight)  Choose a diet rich in fruits, vegetables, and low-fat dairy products, and low in meats, sweets, and refined grains  Eat less salt (sodium)  Do something active for at least 30 minutes a day on most days of the week  Limit the amount of alcohol you drink  If you have high blood pressure, it's also very important to quit smoking (if you smoke). Quitting smoking might not bring your blood pressure down. But it will lower the chances that you'll have a heart attack or stroke, and it will help you feel better and live longer.  Start low and go slow -- The changes listed above might sound like a lot, but don't worry. You don't have to change everything all at once. The key to improving your lifestyle is to "start low and go slow." Choose 1 small, specific thing to change and try doing it for a while. If it works for you, keep doing it until it becomes a habit. If it doesn't, don't give up. Choose something else to change and see how that goes.  Let's say, for example, that you would like to improve your diet. If you're the type of person who eats cheeseburgers and French fries all the time, you can't switch to eating just salads from one day to the next. When people try to make changes like that, " "they often fail. Then they feel frustrated and tend to give up. So instead of trying to change everything about your diet in 1 day, change 1 or 2 small things about your diet and give yourself time to get used to those changes. For instance, keep the cheeseburger but give up the French fries. Or eat the same things but cut your portions in half.  As you find things that you are able to change and stick with, keep adding new changes. In time, you will see that you can actually change a lot. You just have to get used to the changes slowly.  Lose weight -- When people think about losing weight, they sometimes make it more complicated than it really is. To lose weight, you have to either eat less or move more. If you do both of those things, it's even better. But there is no single weight-loss diet or activity that's better than any other. When it comes to weight loss, the most effective plan is the one that you'll stick with.  Improve your diet -- There is no single diet that is right for everyone. But in general, a healthy diet can include:  Lots of fruits, vegetables, and whole grains  Some beans, peas, lentils, chickpeas, and similar foods  Some nuts, such as walnuts, almonds, and peanuts  Fat-free or low-fat milk and milk products  Some fish  To have a healthy diet, it's also important to limit or avoid sugar, sweets, meats, and refined grains. (Refined grains are found in white bread, white rice, most forms of pasta, and most packaged "snack" foods.)  Reduce salt -- Many people think that eating a low-sodium diet means avoiding the salt shaker and not adding salt when cooking. The truth is, not adding salt at the table or when you cook will only help a little. Almost all of the sodium you eat is already in the food you buy at the grocery store or at restaurants (figure 1).  The most important thing you can do to cut down on sodium is to eat less processed food. That means that you should avoid most foods that are " "sold in cans, boxes, jars, and bags. You should also eat in restaurants less often.  To reduce the amount of sodium you get, buy fresh or fresh-frozen fruits, vegetables, and meats. (Fresh-frozen foods have had nothing added to them before freezing.) Then you can make meals at home, from scratch, with these ingredients.  As with the other changes, don't try to cut out salt all at once. Instead, choose 1 or 2 foods that have a lot of sodium and try to replace them with low-sodium choices. When you get used to those low-sodium options, find another food or 2 to change. Then keep going, until all the foods you eat are sodium-free or low in sodium.  Become more active -- If you want to be more active, you don't have to go to the gym or get all sweaty. It is possible to increase your activity level while doing everyday things you enjoy. Walking, gardening, and dancing are just a few of the things that you might try. As with all the other changes, the key is not to do too much too fast. If you don't do any activity now, start by walking for just a few minutes every other day. Do that for a few weeks. If you stick with it, try doing it for longer. But if you find that you don't like walking, try a different activity.  Drink less alcohol -- If you are a woman, do not have more than 1 "standard drink" of alcohol a day. If you are a man, do not have more than 2. A "standard drink" is:  A can or bottle that has 12 ounces of beer  A glass that has 5 ounces of wine  A shot that has 1.5 ounces of whiskey  Where should I start? -- If you want to improve your lifestyle, start by making the changes that you think would be easiest for you. If you used to exercise and just got out of the habit, maybe it would be easy for you to start exercising again. Or if you actually like cooking meals from scratch, maybe the first thing you should focus on is eating home-cooked meals that are low in sodium.  Whatever you tackle first, choose " "specific, realistic goals, and give yourself a deadline. For example, do not decide that you are going to "exercise more." Instead, decide that you are going to walk for 10 minutes on Monday, Wednesday, and Friday, and that you are going to do this for the next 2 weeks.  When lifestyle changes are too general, people have a hard time following through.  Now go. You can do it!  All topics are updated as new evidence becomes available and our peer review process is complete.  This topic retrieved from [employer] on: Sep 21, 2021.  Topic 24613 Version 8.0  Release: 29.4.2 - C29.263  © 2021 UpToDate, Inc. and/or its affiliates. All rights reserved.  figure 1: Sources of sodium in your diet     Graphic 81542 Version 2.0    Consumer Information Use and Disclaimer   This information is not specific medical advice and does not replace information you receive from your health care provider. This is only a brief summary of general information. It does NOT include all information about conditions, illnesses, injuries, tests, procedures, treatments, therapies, discharge instructions or life-style choices that may apply to you. You must talk with your health care provider for complete information about your health and treatment options. This information should not be used to decide whether or not to accept your health care provider's advice, instructions or recommendations. Only your health care provider has the knowledge and training to provide advice that is right for you. The use of this information is governed by the [employer] End User License Agreement, available at https://www.[employer].Navidog/en/solutions/X-IO/about/greta.The use of [employer] content is governed by the [employer] Terms of Use. ©2021 UpToDate, Inc. All rights reserved.  Copyright   © 2021 UpToDate, Inc. and/or its affiliates. All rights reserved.      DASH Diet   About this topic   DASH stands for Dietary Approaches to Stop Hypertension. The DASH diet may " help you lower blood pressure. It may also help keep you from getting high blood pressure. You will eat less fat and more fiber on the DASH diet.  This diet gives you more minerals that fight high blood pressure. Some nutrients in this diet are:  Potassium ? Acts to help you get rid of salt. This may help to lower blood pressure.  Calcium ? Makes blood vessels and muscles work the right way  Magnesium - Helps blood vessels relax  Fiber ? Helps you feel full. It also helps digestion.  What will the results be?   The DASH diet may help you:  Lower your blood pressure and cholesterol  Lower your risk for cancer, heart disease, heart attack, and stroke. It may also lower your risk for heart failure, kidney stones, and diabetes.  Lose weight or keep a healthy weight  What lifestyle changes are needed?   Add regular exercise to get the most help from this diet.  Try to lower stress. Find ways to relax.  Stop smoking. Avoid secondhand smoke.  Limit alcohol intake.  What changes to diet are needed?   Know about poor eating habits. Then, you can fix them as you work with the program.  This diet encourages fruits and vegetables, whole grains, lean meats, healthy fats, and low-fat or fat-free dairy products.  This diet is lower in saturated fats, trans-fats, cholesterol, added sugars, and sodium.  Who should use this diet?   This eating plan is good for the whole family. It is also good for people with high blood pressure and those at risk for high blood pressure.  What foods are good to eat?   Grains: Try to eat 6 to 8 servings of whole grain, high fiber foods each day. These are bread, cereals, brown rice, or pasta.  Fruits and vegetables: Eat 4 to 5 servings each day. Try to pick many kinds and colors. Fresh or frozen are best. Look for low sodium or salt-free if you choose canned.  Dairy: Try to eat 2 to 3 servings of fat free and low fat milk products each day.  Lean meats, poultry, and seafood: Try to eat 6 servings or  less of lean meats, poultry, and seafood each day. Try to choose more low fat or lean meats like chicken and turkey. Eat less red meat. Eat more fish instead.  Nuts, seeds, and legumes (dry beans and peas): Try to eat 4 to 5 servings each week. Try to pick nuts such as almonds and walnuts, sunflower seeds, peanut butter, soy beans, lentils, kidney beans, and split peas.  Fats and oils: Try to eat 2 to 3 servings of fats and oils each day. Eat good fats found in fish, nuts, and avocados. Try using olive oil or vegetable oils such as canola oil. Other good oils to try are corn, safflower, sunflower, or soybean oils. Use low-sodium and low-fat salad dressing and mayonnaise.  Condiments: Pepper, herbs, spices, vinegar, lemon or lime juices are great for seasoning. Be careful to choose low-sodium or salt-free products if you use broths, soups, or soy sauce.  Sweets: Try to eat less than 5 servings each week. Choose low-fat and trans fat-free desserts. These are things like fruit flavored gelatin, sorbet, jelly beans, christine crackers, animal crackers, low-fat fig bars, and fadumo snaps. Eat fruit to satisfy your desire for sweets.     What foods should be limited or avoided?   Grains: Salted breads, rolls, crackers, quick breads, self-rising flours, biscuit mixes, regular bread crumbs, instant hot cereals, commercially-prepared rice, pasta, stuffing mixes  Fruits and vegetables: Commercially-prepared potatoes and vegetable mixes, regular canned vegetables and juices, vegetables frozen with sauce or pickled vegetables, processed fruits with salt or sodium  Milk: Whole milk, malted milk, chocolate milk, buttermilk, cheese, ice cream  Meats and beans: Smoked, cured, salted, or canned fish; meats or poultry such as calle, sausages, sardines; high-fat cuts of meat like beef, lamb, or pork; chicken with the skin on it  Fats: Cut back on solid fats like butter, lard, and margarine. Eat less food with high saturated fat,  cholesterol and total fat.  Condiments and snacks: Salted and canned peas, beans, and olives; salted snack foods; fried foods; soda or other sweetened drinks  Sweets: High-fat baked goods such as muffins, donuts, pastries, commercial baked goods, candy bars  If you choose to drink alcohol, limit the amount you drink. Women should have 1 drink or less per day and men should have 2 drinks or less per day.  Helpful tips   Avoid eating canned vegetables and processed foods. These have a lot of salt in them. Look for a low-salt or low-sodium choice.  Try baking or broiling instead of frying food.  Write down the foods you eat. This will help you track what you have eaten each week.  When you go to a grocery store, have a list or a meal plan. Do not shop when you are hungry to avoid cravings for foods.  Read food labels with care. They will show you how much is in a serving. The amount is given as a percentage of the total amount you need each day. Reading labels will help you make healthy food choices.       Avoid fast foods.  Talk to your doctor or dietitian to see if you need vitamin and mineral supplements to help you balance your diet.  Talk to a dietitian for help.  Where can I learn more?   Academy of Nutrition and Dietetics  https://www.eatright.org/health/wellness/heart-and-cardiovascular-health/dash-diet-reducing-hypertension-through-diet-and-lifestyle   FamilyDoctor.org  http://familydoctor.org/familydoctor/en/prevention-wellness/food-nutrition/weight-loss/the-dash-diet-healthy-eating-to-control-your-blood-pressure.html   Last Reviewed Date   2021-03-15  Consumer Information Use and Disclaimer   This information is not specific medical advice and does not replace information you receive from your health care provider. This is only a brief summary of general information. It does NOT include all information about conditions, illnesses, injuries, tests, procedures, treatments, therapies, discharge instructions or  life-style choices that may apply to you. You must talk with your health care provider for complete information about your health and treatment options. This information should not be used to decide whether or not to accept your health care providers advice, instructions or recommendations. Only your health care provider has the knowledge and training to provide advice that is right for you.  Copyright   Copyright © 2021 UpToDate, Inc. and its affiliates and/or licensors. All rights reserved.    Why Water Is Important to Health   About this topic   Your body needs a certain amount of water to work the right way. Your body takes in water from the fluids you drink and the food you eat. This helps your body get rid of waste products. Water also helps keep your temperature normal, helps with digestion, protects your spinal cord, and lubricates your joints.  If you dont have enough water, doctors say you are dehydrated. You must take in enough water each day to replace what your body loses when you:  Sweat  Pass urine  Have a bowel movement  Breathe  You may lose even more water than normal if you are sick or hurt with something like:  A fever  A burn  An illness where you throw up or have loose stools  Some medicines can also make you lose more water than normal.  General   Most people have heard they need 6 to 8 glasses of water each day. This suggestion is not backed by science. Different people need to drink different amounts of water. How much you need to drink is based on things like your age, body, health, weather, and how active you are. It is even possible to drink too much water in some cases. Being thirsty is your bodys way of telling you it needs fluids.  Good ways to make sure you take in enough fluids include:  Drink healthy fluids when you are thirsty. These are things like water, low-fat milk, plain or flavored seltzer, or unsweetened tea or coffee.  Eat foods that have a higher water content. Fruits  and vegetables like strawberries, watermelon, tomatoes, and celery all have high water content.  Drink water before, during, and after a workout. Only drink sports drinks if you plan to exercise at an intense rate for more than an hour. Sports drinks have carbohydrates and electrolytes that can help with recovery.  Drink water with meals.  There are some kinds of drinks that are not healthy and should be limited or avoided. Try NOT to drink:  Sugary drinks like soda, sports drinks, or sweetened tea or coffee. These can add calories to your diet and lead to tooth decay.  Energy drinks. These can have a lot of caffeine and sugar in them.  Juice drinks. These often have limited amounts of juice and a lot of sugar in them.  Too much alcohol or caffeine. Both of these can cause dehydration.  Tips to increase your fluid intake:  Keep a bottle of water with you. This can encourage you to drink more.  Add fruits or vegetables to plain water to change the taste. Add berries, adrián, or cucumbers to your water to flavor it.  When you are hungry, you might actually be thirsty. True hunger will not go away by drinking water. Water can help you manage your weight.  If you have trouble remembering to drink water, try drinking water on a schedule. Set specific times for when you will drink water.  Choose water when eating at restaurants.  Where can I learn more?   American Association of Family Physicians  https://familydoctor.org/hydration-why-its-so-important/   Better Health Channel  https://www.betterhealth.kathy.gov.au/health/HealthyLiving/water-a-vital-nutrient   Centers for Disease Control and Prevention  https://www.cdc.gov/healthywater/drinking/nutrition/index.html   Kids Health  https://kidshealth.org/en/kids/water.html   Last Reviewed Date   2021-09-09  Consumer Information Use and Disclaimer   This information is not specific medical advice and does not replace information you receive from your health care provider. This  is only a brief summary of general information. It does NOT include all information about conditions, illnesses, injuries, tests, procedures, treatments, therapies, discharge instructions or life-style choices that may apply to you. You must talk with your health care provider for complete information about your health and treatment options. This information should not be used to decide whether or not to accept your health care providers advice, instructions or recommendations. Only your health care provider has the knowledge and training to provide advice that is right for you.  Copyright   Copyright © 2021 UpToDate, Inc. and its affiliates and/or licensors. All rights reserved.   Exercise and Aging   General   Exercise can help older adults prevent falls and stay independent longer. Exercise may also help:  You have stronger muscles and bones and better balance  You lose weight and have more energy  Make your heart and lungs stronger  Lower your chance of health problems like heart disease, high blood sugar, or breast or colon cancer  Control conditions like high blood pressure and diabetes  You manage stress and get better sleep  Your mood, self-esteem, and self-image  How you think, plan, and pay attention  There are four types of exercise: endurance, strength, balance, and flexibility.  Endurance exercises get your heart rate up and keep it up for a while. Most people should get at least 30 minutes of exercise that gets your heart rate up on 5 or more days each week. Walking, swimming, biking, or going up and down stairs are kinds of exercises to get your heart rate up. You do not have to do all 30 minutes at one time. Try doing 10 minutes 3 times a day.  Strength exercises build muscle. Lifting weights or doing knee bends are kinds of strength exercises.  Balance exercises help to prevent falls. Raise up on your toes or stand on one leg as a kind of balance exercise.  Flexibility exercises move your joints  through a full range of motion and stretch your muscles. Bend forward in a chair to stretch your back, do stretches, or bend and extend your arms or legs as a kind of flexibility exercise. Try doing 10 minutes twice a week.  Plan to include all these exercise types in your exercise program. Always check with your doctor before you start a new exercise program.  Some people do not like formal exercise classes, but there are many ways to work your muscles each day. Here are some things you can do.  Use steps instead of elevators.  Park far away in parking lots to walk farther.  Use the time while you wait. Do knee bends as you hold onto the kitchen counter while you wait for your coffee to brew.  When you unload groceries, lift the bags or a container of milk a few times to exercise your arms and legs.  Walk the long way when you go somewhere.  After you walk to the bathroom, walk a few laps around the house before sitting down.  Try doing different standing exercises after you wash your hands each time in the bathroom.  Do balance exercises while you brush your teeth.  Do an exercise or get up and walk during TV commercials.  Don't forget to exercise small muscle groups like your hands, fingers, ankles, toes, and neck. Wiggle, bend, flex, and rotate these joints from left to right and back to front to help to keep these joints flexible.  When do I need to call the doctor?   Stop exercising and talk to your doctor if you have any of these problems:  Dizziness  Shortness of breath  Pain or pressure in the chest, arms, throat, jaw, or back  Nausea or vomiting  Blood clots  Infection  Joint swelling  Open wounds  Recent hip, back, or eye surgery  New problems that start during exercise  Helpful tips   If you have a health problem like heart disease or diabetes, talk with your doctor about the best exercises for you.  Always warm up before you stretch. Heated muscles stretch much easier than cool muscles. Try to walk or  bike at an easy pace for a few minutes to warm up your muscles.  Slow your pace again after you exercise to cool down and bring your heart rate down slowly.  Be sure you do not hold your breath when you exercise because it can raise your blood pressure. If you tend to hold your breath, try to count out loud when you exercise.  Never bounce when doing stretches. Use slow and steady motions and hold your stretch for 20 to 30 seconds.  Have a routine. Doing exercises before a meal may be a good way to get into a routine.  Set small goals for yourself when you start to exercise. Use a chart to see how much you are doing. Ask someone to exercise with you.  Exercise may be slightly uncomfortable, but you should not have sharp pains. If you do get sharp pains, stop what you are doing. If the sharp pains continue, call your doctor.  Drink plenty of fluids without caffeine when you exercise and afterwards. Avoid outdoor exercise if it is too cold or too hot.  Wear the right clothes and shoes. Try layers of clothes, so that you can take them off if you get too hot. Shoes should fit well and support your feet.  Where can I learn more?   National [place] on Aging  https://www.fanny.nih.gov/health/publication/exercise-physical-activity/introduction   Last Reviewed Date   2021-03-18  Consumer Information Use and Disclaimer   This information is not specific medical advice and does not replace information you receive from your health care provider. This is only a brief summary of general information. It does NOT include all information about conditions, illnesses, injuries, tests, procedures, treatments, therapies, discharge instructions or life-style choices that may apply to you. You must talk with your health care provider for complete information about your health and treatment options. This information should not be used to decide whether or not to accept your health care providers advice, instructions or recommendations.  Only your health care provider has the knowledge and training to provide advice that is right for you.  Copyright   Copyright © 2021 UpToDate, Inc. and its affiliates and/or licensors. All rights reserved.      Diet and Health   The Basics   Written by the doctors and editors at [facility]   Why is it important to eat a healthy diet? -- It's important to eat a healthy diet because eating the right foods can keep you healthy now and later on in life.  Which foods are especially healthy? -- Foods that are especially healthy include:  Fruits and vegetables - Eating a diet with lots of fruits and vegetables can help prevent heart disease and strokes. It might also help prevent certain types of cancers. Try to eat fruits and vegetables at each meal and also for snacks. If you don't have fresh fruits and vegetables available, you can eat frozen or canned ones instead. Doctors recommend eating at least 2 1/2 servings of vegetables and 2 servings of fruits each day.  Foods with fiber - Eating foods with a lot of fiber can help prevent heart disease and strokes. If you have type 2 diabetes, it can also help control your blood sugar. Foods that have a lot of fiber include vegetables, fruits, beans, nuts, oatmeal, and whole grain breads and cereals. You can tell how much fiber is in a food by reading the nutrition label (figure 1). Doctors recommend eating 25 to 36 grams of fiber each day.  Foods with folate (also called folic acid) - Folate is a vitamin that is important for pregnant people, since it helps prevent certain birth defects. Anyone who could get pregnant should get at least 400 micrograms of folic acid daily, whether or not they are actively trying to get pregnant. Folate is found in many breakfast cereals, oranges, orange juice, and green leafy vegetables.  Foods with calcium and vitamin D - Babies, children, and adults need calcium and vitamin D to help keep their bones strong. Adults also need calcium and vitamin  "D to help prevent osteoporosis. Osteoporosis is a condition that causes bones to get "thin" and break more easily than usual. Different foods and drinks have calcium and vitamin D in them (figure 2). People who don't get enough calcium and vitamin D in their diet might need to take a supplement.  Foods with protein - Protein helps your muscles stay strong. Healthy foods with a lot of protein include chicken, fish, eggs, beans, nuts, and soy products. Red meat also has a lot of protein, but it also contains fats, which can be unhealthy.  Some experts recommend a "Mediterranean diet." This involves eating a lot of fruits, vegetables, nuts, whole grains, and olive oil. It also includes some fish, poultry, and dairy products, but not a lot of red meat. Eating this way can help your overall health, and might even lower your risk of having a stroke.  What foods should I avoid or limit? -- To eat a healthy diet, there are some things you should avoid or limit. They include:   Fats - There are different types of fats. Some types of fats are better for your body than others.  Trans fats are especially unhealthy. They are found in margarines, many fast foods, and some store-bought baked goods. Trans fats can raise your cholesterol level and your increase your chance of getting heart disease. You should avoid eating foods with these types of fats.  The type of "polyunsaturated" fats found in fish seems to be healthy and can reduce your chance of getting heart disease. Other polyunsaturated fats might also be good for your health. When you cook, it's best to use oils with healthier fats, such as olive oil and canola oil.  Sugar - To have a healthy diet, it's important to limit or avoid sugar, sweets, and refined grains. Refined grains are found in white bread, white rice, most forms of pasta, and most packaged "snack" foods. Whole grains, such as whole-wheat bread and brown rice, have more fiber and are better for your " "health.  Avoiding sugar-sweetened beverages, like soda and sports drinks, can also help improve your health.  Red meat - Studies have shown that eating a lot of red meat can increase your risk of certain health problems, including heart disease and cancer. You should limit the amount of red meat that you eat.  Can I drink alcohol as part of a healthy diet? -- People who drink a small amount of alcohol each day might have a lower chance of getting heart disease. But drinking alcohol can lead to problems. For example, it can raise a person's chances of getting liver disease and certain types of cancers. In women, even 1 drink a day can increase the risk of getting breast cancer.  Most doctors recommend that adult women not have more than 1 drink a day and that adult men not have more than 2 drinks a day. The limits are different because most women's bodies take longer to break down alcohol.  How many calories do I need each day? -- The number of calories you need each day depends on your weight, height, age, sex, and how active you are.  Your doctor or nurse can tell you how many calories you should eat each day. If you are trying to lose weight, you should eat fewer calories each day.  What if I have questions? -- If you have questions about which foods you should or should not eat, ask your doctor or nurse. The right diet for you will depend, in part, on your health and any medical conditions you have.  All topics are updated as new evidence becomes available and our peer review process is complete.  This topic retrieved from [employer] on: Sep 21, 2021.  Topic 58564 Version 20.0  Release: 29.4.2 - C29.263  © 2021 UpToDate, Inc. and/or its affiliates. All rights reserved.  figure 1: Nutrition label - fiber     This is an example of a nutrition label. To figure out how much fiber is in a food, look for the line that says "Dietary Fiber." It's also important to look at the serving size. This food has 7 grams of fiber in " "each serving, and each serving is 1 cup.  Graphic 41809 Version 7.0    figure 2: Foods and drinks with calcium and vitamin D     Foods rich in calcium include ice cream, soy milk, breads, kale, broccoli, milk, cheese, cottage cheese, almonds, yogurt, ready-to-eat cereals, beans, and tofu. Foods rich in vitamin D include milk, fortified plant-based "milks" (soy, almond), canned tuna fish, cod liver oil, yogurt, ready-to-eat-cereals, cooked salmon, canned sardines, mackerel, and eggs. Some of these foods are rich in both.  Graphic 59543 Version 4.0    Consumer Information Use and Disclaimer   This information is not specific medical advice and does not replace information you receive from your health care provider. This is only a brief summary of general information. It does NOT include all information about conditions, illnesses, injuries, tests, procedures, treatments, therapies, discharge instructions or life-style choices that may apply to you. You must talk with your health care provider for complete information about your health and treatment options. This information should not be used to decide whether or not to accept your health care provider's advice, instructions or recommendations. Only your health care provider has the knowledge and training to provide advice that is right for you. The use of this information is governed by the [employer] End User License Agreement, available at https://www.EnglishUp.trgt.us/en/solutions/[employer]/about/greta.The use of [employer] content is governed by the [employer] Terms of Use. ©2021 UpToDate, Inc. All rights reserved.  Copyright   © 2021 UpToDate, Inc. and/or its affiliates. All rights reserved.      Advance Directives   The Basics   Written by the doctors and editors at [facility]   What are advance directives? -- Advance directives are legal documents that allow you to spell out ahead of time what of types medical care you would want if you ever became unable to speak for " yourself. These documents can help ensure that you get the care you want even if you have an unexpected serious illness or accident. The documents can also make things easier for the people who will need to make decisions for you if you ever become unable to make them for yourself.  Are there different kinds of advance directives? -- Yes. The most useful kinds of advance directives are:  Health care proxy (also called the durable power of  for health care) - The health care proxy document allows you to choose someone to make medical decisions for you if you become unable to speak for yourself. The benefit of having this document is that it makes your choice of a decision-maker clear to your doctors and family members. When you choose a health care proxy, it is important to talk to the person you choose about the things that you do or don't want. That way your decision-maker knows what to do later on if they ever have to speak for you.  Living will - A living will is the document that tells health care providers what type of care you want if you become unable to speak for yourself. For instance, a living will allows you to record in writing whether you would want a feeding tube put in if you had a serious illness or accident.  Do not resuscitate/do not intubate order (also called a DNR/DNI) - If you decide you do not want your heart restarted if it stops and you do not want a breathing tube put in if you stop breathing, you can ask for a DNR/DNI. This is a form that must be signed by a doctor. It tells all your health care providers that you have decided you do not want these treatments.  Advance directives work best when they are part of a team effort that includes not only the person making the decisions, but also doctors, emergency health workers, and places like hospitals and nursing homes.  The Physician Orders for Life Sustaining Treatment (POLST) is a form for people who already have a serious illness  "or are very weak and likely to need medical help. The POLST form spells out exactly what care should be given, and not given, based on your choices and wishes. It is signed by your doctor, and you can keep a copy at home to be used in the event of an emergency. A copy is also kept on file at any hospital or other place where you might get medical care. Not every state has a POLST program. To find out if your state has one, you can go online to www.polst.org.  How do I choose a health care proxy? -- Choose someone who:  You know and trust  Can separate their own wishes from yours  You know would carry out your wishes if that became necessary  Could be easily reached if they were needed  Could handle it if other family members or loved ones wanted you to get treated differently than you would want  Some people choose a second person as an alternate proxy, in case their first choice cannot be reached at the time decisions need to be made.  Who should have an advance directive? -- Advance directives are a good idea for anyone, but they are especially important if:  You are older than 65.  You have a serious life-threatening illness, such as advanced cancer, or end-stage heart or liver failure.  You want to make sure you can choose the person you would like as your health care proxy (decision-maker).  What kinds of decisions will I need to make? -- Your advance directives can have as much or as little detail as you want. But many people who have advance directives record their wishes about the following treatments:  Breathing tubes - If you stop breathing or are having a very hard time breathing, you can get attached to a machine that will help you breathe. For that to happen, you will have to be "intubated." That means that a tube will be put down your throat and into your lungs. Then the tube will be connected to a breathing machine. When the tube is in place, you will not be able to talk, at least at first. Plus, you " "will probably be sedated, meaning that you are on medicines that make you sleep.  Sometimes a breathing machine is needed only for a short time. For instance, some people need the breathing machine just while they recover from a lung infection. When deciding about a breathing tube, consider whether you would want it at all, want it only for a short time, or want it no matter what. Also, keep in mind that any time a breathing machine is used, it is hard to know for sure if and when it will be able to be disconnected.  Cardiopulmonary resuscitation (CPR) - If your heart stops beating suddenly, doctors might be able to restart it by pumping on your chest, putting in a breathing tube and pushing air into your lungs, giving you an electric shock (called "defibrillation"), and/or giving you special medicines. Some people recover completely after having their heart restarted. Others have permanent brain damage from a lack of blood flow to the brain; this is most likely in people who have an advanced, serious illness.  Feeding tubes - If you become unable to eat, you can have a tube put into your stomach or intestines that can deliver nutrients. A feeding tube can keep a person's body going while they heal and gets strong. But it can also keep a person alive for a long time even if there is no chance the person will recover.  Can I change my mind? -- Yes. You can change your mind at any time. If you sign an advance directive and you decide you want a different kind of treatment or you no longer want the health care proxy you chose, all you have to do is tell your doctor or nurse about your new decision. If you want to name a new health care proxy or want to record new wishes, you can draw up new documents.  How can I draw up an advance directive? -- The following table lists resources that can help you learn more about making your own advance directives (table 1).  All topics are updated as new evidence becomes available " and our peer review process is complete.  This topic retrieved from [employer] on: Sep 21, 2021.  Topic 82650 Version 13.0  Release: 29.4.2 - C29.263  © 2021 UpToDate, Inc. and/or its affiliates. All rights reserved.  table 1: Resources that can help you make advance directives     Address  Phone number  Website    [facility]  [address] [place] [address] Toll-free: (858) OUR[facility]  [phone]] http://assets.aar.org/external_sites/ caregiving/multimedia/EG_AdvanceDirectives.html    Aging with Dignity (Five Wishes form)  [address]  [place] [address] Toll-free: [phone] 5WISHES  [phone]] www.agingwithdignity.org    [facility]    Toll-free: [phone] www.[email or web address]    [facility] [employer]  [employer]o [employer]  [address] [place] [address] [phone] www.[email or web address]    Graphic 88412 Version 7.0  Consumer Information Use and Disclaimer   This information is not specific medical advice and does not replace information you receive from your health care provider. This is only a brief summary of general information. It does NOT include all information about conditions, illnesses, injuries, tests, procedures, treatments, therapies, discharge instructions or life-style choices that may apply to you. You must talk with your health care provider for complete information about your health and treatment options. This information should not be used to decide whether or not to accept your health care provider's advice, instructions or recommendations. Only your health care provider has the knowledge and training to provide advice that is right for you. The use of this information is governed by the [employer] End User License Agreement, available at https://www.Whisper[employer]/en/solutions/[employer]/about/greta.The use of [employer] content is governed by the [employer] Terms of Use. ©2021 UpToDate, Inc. All rights reserved.  Copyright   © 2021 UpToDate, Inc. and/or  its affiliates. All rights reserved.      Preventing Falls in the Older Adult   About this topic   A fall is the sudden loss of balance that causes a person to drop to the ground or floor. Falls are a serious health risk and they happen more often as we get older. Many things may increase your risk of falling, like:  Problems that come with getting older  Muscle weakness  Balance problems  More trouble seeing  Personal health factors  Health conditions such as arthritis, Parkinson's disease, low blood pressure, or stroke  Medicines you take  Loss of feeling in your feet  Being less active  Taking drugs that makes you dizzy or drowsy  Habits like alcohol use  Things around your house  Slippery floors  Unsecured rugs  Stairs  Wearing improper fitting shoes  Areas where it is dark and difficult to see  Incorrect size or type of assistive devices  Clutter and items on the floor that block your walkway     What will the results be?   Prevent future falls  Avoid injuries and disabilities  Improve overall health  Will there be any other care needed?   Ask your doctor if you need to take vitamin D to help keep your bones strong.  Make your home safer. Get rid of things that might make you trip or slip. These are things like loose rugs, electrical cords, or clutter. Add grab bars, a shower seat, and handrails.  Wear sturdy shoes that fit well. Shoes should fit well, have a low heel, and the soles of the shoe should not be slippery. Walking in socks or with bare feet can raise your chance for falling.  Stay active. Walk, garden, swim, or do something active on a regular basis. These activities may prevent you from getting hurt if you do fall. They also help with your strength and balance.  Use a cane, walker, or other safety device. Be sure it is the right size for you and that you know how to use it safely. Be sure to wear your eyeglasses if they have been ordered for you.  Get up slowly after you sit or lie down. Try to  change positions slowly.  What to do if you fall:  Stay calm and do not panic.  Look for signs to decide if you have been hurt or have an injury.  If you think you can get up safely, try to get up.  If you are hurt or cannot get up on your own, try to get help.  If no one is available to help, try to get comfortable and wait for someone to arrive who can help you.  Stay warm and move regularly as you are able. Avoid putting too much pressure on any one area.  After a fall, tell family and friends that you have fallen. It is also important to talk to your doctor about your fall right away.  What problems could happen?   A fall can lead to broken bones and other serious injuries in older adults. Problems that happen because of a fall may even lead to death in older adults. Many people are not able to return to their former level of activity after a fall.  What can be done to prevent this health problem?   Lower Your Risk of Falling  Wear your eyeglasses. Have regular eye checkups. Do not use reading glasses when you walk around.  Quit smoking and limit alcohol intake. Smoking and too much alcohol can decrease bone mass and increase the chance of broken bones.  Know the side effects of the drugs you are taking. Some drugs may affect your balance and cause confusion or sleepiness.  Get up slowly after you sit or lie down. Do not change positions quickly. Do not rush when you need to go to the bathroom or to answer the phone.  Stay Physically Active  Be physically active. This will help to improve your strength and balance.  Fear of falling may lead you to avoid activities. Talk to your doctor. You may be sent to a physical therapist. This person can help you improve balance and build your confidence. Getting rid of your fear of falling can help you stay active and prevent future falls.  Join an exercise program. Ask your doctor what exercise is safe for you. Be sure to ask before you do any exercises, especially if you  have illnesses like arthritis. Exercise can help you keep muscles strong and help with your balance. It is also a good way to learn proper ways to do each activity or exercise.  Safety Tips at Home  Keep your floors and walking areas clear from clutter. Remove furniture that blocks your way. Secure cords and wires near the wall to avoid tripping over them. Get rid of throw rugs.  Be sure the lights in your house are working well and provide good lighting throughout your home. Make sure you can reach switches and lamps easily. Place a lamp close to your bed that is easy to reach.  Fix all steps and sidewalks to make them smooth and even. Put handrails and lights on stairs.  Keep all the things you use often on low shelves or in cabinets that are at about waist level. Ask for help to move items off of high shelves. Do not use a chair as a step stool.  Keep your bathroom area safe. Use nonslip rubber mats on the floor and in the tub or shower.  Keep a phone near you in case of emergency. Keep a list of your emergency contact numbers in large print near your phone. Carry a phone with you when you go for a walk. Consider using a personal alarm device that could call for help in case you fall and cannot get up.  Think about protecting your hip. Hip protectors may be needed if you have a higher chance for falling. Ask your doctor about this.  Where can I learn more?   American Academy of Family Physicians  https://familydoctor.org/pmrsq-pgs-yy-lower-your-risk/   NHS  https://www.nhs.uk/conditions/falls/prevention/   Last Reviewed Date   2021-06-07  Consumer Information Use and Disclaimer   This information is not specific medical advice and does not replace information you receive from your health care provider. This is only a brief summary of general information. It does NOT include all information about conditions, illnesses, injuries, tests, procedures, treatments, therapies, discharge instructions or life-style choices  that may apply to you. You must talk with your health care provider for complete information about your health and treatment options. This information should not be used to decide whether or not to accept your health care providers advice, instructions or recommendations. Only your health care provider has the knowledge and training to provide advice that is right for you.  Copyright   Copyright © 2021 [employer] and its affiliates and/or licensors. All rights reserved.

## 2024-12-18 NOTE — PROGRESS NOTES
Subjective     Patient ID: Brandee Contreras is a 74 y.o. female.    Chief Complaint: Follow-up (Exam Room A)    History of Present Illness    CHIEF COMPLAINT:  Patient presents today for follow up.    CURRENT SYMPTOMS:  She reports intermittent dizziness without clear trigger, denying positional changes as a factor. She experiences blurred vision bilaterally, more pronounced in the right eye, causing difficulty with reading and driving, especially at night. She denies eye pain, flashing lights, or sudden vision loss. She has elevated blood pressure and intermittent palpitations but denies shortness of breath, chest pain, and headache.    SURGICAL HISTORY:  History of bilateral hip replacements.      ROS:  General: -fever, -chills, -fatigue, -weight gain, -weight loss  Eyes: +vision changes, -redness, -discharge, -eye pain  ENT: -ear pain, -nasal congestion, -sore throat  Cardiovascular: -chest pain, -palpitations, -lower extremity edema  Respiratory: -cough, -shortness of breath  Gastrointestinal: -abdominal pain, -nausea, -vomiting, -diarrhea, -constipation, -blood in stool  Genitourinary: -dysuria, -hematuria, -frequency  Musculoskeletal: -joint pain, -muscle pain  Skin: -rash, -lesion  Neurological: -headache, +dizziness, -numbness, -tingling  Psychiatric: -anxiety, -depression, -sleep difficulty               Objective       Physical Exam    Vitals: High blood pressure. Tachycardia.  General: No acute distress. Well-developed. Well-nourished.  Eyes: EOMI. Sclerae anicteric.  HENT: Normocephalic. Atraumatic. Nares patent. Moist oral mucosa.  Cardiovascular: Regular rate. Regular rhythm. No murmurs. No rubs. No gallops. Normal S1, S2.  Respiratory: Normal respiratory effort. Clear to auscultation bilaterally. No rales. No rhonchi. No wheezing.  Abdomen: Soft. Non-tender. Non-distended. Normoactive bowel sounds.  Musculoskeletal: No  obvious deformity.  Extremities: No lower extremity edema.  Neurological: Alert  & oriented x3. No slurred speech. Normal gait.  Psychiatric: Normal mood. Normal affect. Good insight. Good judgment.  Skin: Warm. Dry. No rash.             Assessment and Plan     1. Hypertension, unspecified type  -     amLODIPine (NORVASC) 5 MG tablet; Take 1 tablet (5 mg total) by mouth once daily. for blood pressure  Dispense: 90 tablet; Refill: 1  -     chlorthalidone (HYGROTEN) 25 MG Tab; Take 1 tablet (25 mg total) by mouth once daily. for fluid or blood pressure  Dispense: 90 tablet; Refill: 1  -     EKG 12-lead; Future  -     Ambulatory referral/consult to Cardiology; Future; Expected date: 12/25/2024  -     Comprehensive Metabolic Panel; Future; Expected date: 12/18/2024  -     Lipid Panel; Future; Expected date: 12/18/2024  -     blood pressure monitor Kit; 1 Device by Misc.(Non-Drug; Combo Route) route 2 (two) times a day.  Dispense: 1 each; Refill: 0    2. Palpitations  -     EKG 12-lead; Future  -     Ambulatory referral/consult to Cardiology; Future; Expected date: 12/25/2024  -     CBC Auto Differential; Future; Expected date: 12/18/2024  -     TSH; Future; Expected date: 12/18/2024  -     T4, Free; Future; Expected date: 12/18/2024      Assessment & Plan    IMPRESSION:  - Evaluated patient's elevated blood pressure and heart rate  - Considered potential cardiac issues due to reported dizziness and blurry vision  - Will order EKG to assess cardiac function, given patient's symptoms and upcoming travel  - Determined need for cardiology referral for further evaluation    HYPERTENSION:  - Assessed the patient's blood pressure, which was found to be elevated.  - Rechecked blood pressure after initial high reading.  - Prescribed antihypertensive medication to be taken in the morning.  - Ordered a blood pressure cuff for home monitoring.  - Instructed the patient on proper use of the blood pressure cuff for regular monitoring.    TACHYCARDIA:  - Evaluated the patient's heart rate, which was found to be  elevated.  - Assessed the patient's heart rate response to standing.  - Ordered an electrocardiogram (EKG) to assess heart rhythm.  - Referred the patient to cardiology for further evaluation and management.    DIZZINESS:  - Noted the patient's report of occasional dizziness.  - Inquired about dizziness specifically when standing up.  - Incorporated dizziness evaluation into the overall cardiac workup.    PALPITATIONS:  - Inquired about the presence and frequency of palpitations.  - Ordered an electrocardiogram (EKG) to evaluate palpitations.  - Referred the patient to cardiology for further assessment of palpitations.  - Instructed the patient to seek emergency care if experiencing palpitations while away.    BLURRY VISION:  - Noted the patient's report of blurry vision.  - Instructed the patient to seek emergency care if experiencing blurry vision while away.    HIP REPLACEMENT HISTORY:  - Noted the patient's history of right hip replacement.  - Noted the patient's history of left hip replacement.    CARDIOLOGY REFERRAL:  - Ordered an electrocardiogram (EKG) to be performed at the hospital's heart station.  - Instructed the patient to go to the hospital today for the EKG after picking up the prescription.  - Referred the patient to cardiology for comprehensive cardiovascular exam and evaluation.    OTHER INSTRUCTIONS:  - Educated patient on the importance of seeking immediate medical attention for concerning symptoms while traveling.  - Patient to go to the ER if experiencing shortness of breath while away.    FOLLOW UP:  - Patient to contact the office if needed before provider transition.  - Follow up after lab work is completed today.                Follow up in about 3 weeks (around 1/8/2025) for blood pressure.    This note was generated with the assistance of ambient listening technology. Verbal consent was obtained by the patient and accompanying visitor(s) for the recording of patient appointment to  facilitate this note. I attest to having reviewed and edited the generated note for accuracy, though some syntax or spelling errors may persist. Please contact the author of this note for any clarification.         I spent a total of 20 minutes on the day of the visit.This includes face to face time and non-face to face time preparing to see the patient (eg, review of tests), obtaining and/or reviewing separately obtained history, documenting clinical information in the electronic or other health record, independently interpreting results and communicating results to the patient/family/caregiver, or care coordinator.    MEME Talbert

## 2025-02-04 ENCOUNTER — OFFICE VISIT (OUTPATIENT)
Dept: FAMILY MEDICINE | Facility: CLINIC | Age: 75
End: 2025-02-04
Payer: MEDICARE

## 2025-02-04 VITALS
DIASTOLIC BLOOD PRESSURE: 81 MMHG | HEIGHT: 64 IN | RESPIRATION RATE: 18 BRPM | SYSTOLIC BLOOD PRESSURE: 115 MMHG | BODY MASS INDEX: 26.36 KG/M2 | WEIGHT: 154.38 LBS | OXYGEN SATURATION: 100 % | HEART RATE: 79 BPM

## 2025-02-04 DIAGNOSIS — Z20.822 CONTACT WITH AND (SUSPECTED) EXPOSURE TO COVID-19: ICD-10-CM

## 2025-02-04 DIAGNOSIS — J06.9 UPPER RESPIRATORY TRACT INFECTION, UNSPECIFIED TYPE: Primary | ICD-10-CM

## 2025-02-04 LAB
CTP QC/QA: YES
MOLECULAR STREP A: NEGATIVE
POC MOLECULAR INFLUENZA A AGN: NEGATIVE
POC MOLECULAR INFLUENZA B AGN: NEGATIVE
SARS-COV-2 RDRP RESP QL NAA+PROBE: NEGATIVE

## 2025-02-04 PROCEDURE — 99214 OFFICE O/P EST MOD 30 MIN: CPT | Mod: ,,, | Performed by: NURSE PRACTITIONER

## 2025-02-04 PROCEDURE — 3288F FALL RISK ASSESSMENT DOCD: CPT | Mod: ,,, | Performed by: NURSE PRACTITIONER

## 2025-02-04 PROCEDURE — 87651 STREP A DNA AMP PROBE: CPT | Mod: RHCUB | Performed by: NURSE PRACTITIONER

## 2025-02-04 PROCEDURE — 1101F PT FALLS ASSESS-DOCD LE1/YR: CPT | Mod: ,,, | Performed by: NURSE PRACTITIONER

## 2025-02-04 PROCEDURE — 87635 SARS-COV-2 COVID-19 AMP PRB: CPT | Mod: RHCUB | Performed by: NURSE PRACTITIONER

## 2025-02-04 PROCEDURE — 1160F RVW MEDS BY RX/DR IN RCRD: CPT | Mod: ,,, | Performed by: NURSE PRACTITIONER

## 2025-02-04 PROCEDURE — 1126F AMNT PAIN NOTED NONE PRSNT: CPT | Mod: ,,, | Performed by: NURSE PRACTITIONER

## 2025-02-04 PROCEDURE — 1159F MED LIST DOCD IN RCRD: CPT | Mod: ,,, | Performed by: NURSE PRACTITIONER

## 2025-02-04 PROCEDURE — 3079F DIAST BP 80-89 MM HG: CPT | Mod: ,,, | Performed by: NURSE PRACTITIONER

## 2025-02-04 PROCEDURE — 87502 INFLUENZA DNA AMP PROBE: CPT | Mod: RHCUB | Performed by: NURSE PRACTITIONER

## 2025-02-04 PROCEDURE — 3074F SYST BP LT 130 MM HG: CPT | Mod: ,,, | Performed by: NURSE PRACTITIONER

## 2025-02-04 RX ORDER — BENZONATATE 100 MG/1
100 CAPSULE ORAL 3 TIMES DAILY PRN
Qty: 30 CAPSULE | Refills: 0 | Status: SHIPPED | OUTPATIENT
Start: 2025-02-04 | End: 2025-02-15

## 2025-02-04 RX ORDER — AZITHROMYCIN 250 MG/1
TABLET, FILM COATED ORAL
Qty: 6 TABLET | Refills: 0 | Status: SHIPPED | OUTPATIENT
Start: 2025-02-04 | End: 2025-02-10

## 2025-02-05 NOTE — PROGRESS NOTES
[facility]  Chief Complaint      Chief Complaint   Patient presents with    Cough     74 y.o. female presents with c/o chest tightness making her cough. Denies headaches, fever, chills, runny nose. Patient tried jake seltzer and it helped a little bit.          History of Present Illness      Brandee Contreras is a 74 y.o. female who presents today with reports of chest tightness and nonproductive cough. Onset of symptoms 1-2 days ago. The pt reports taking OTC jake seltzer plus medication for symptoms with modest relief of symptoms.    Cough  Pertinent negatives include no chest pain, fever, headaches, myalgias, rash, shortness of breath or wheezing.      Past Medical History:  Past Medical History:   Diagnosis Date    Arthritis     Hypertension     Nicotine dependence        Past Surgical History:   has a past surgical history that includes Vaginal delivery; Joint replacement (Left, 11/14/2023); robotic arthroplasty, hip (Left, 11/14/2023); and robotic arthroplasty, hip (Right, 6/18/2024).    Social History:  Social History     Tobacco Use    Smoking status: Former     Current packs/day: 0.25     Average packs/day: 0.2 packs/day for 4.0 years (1.0 ttl pk-yrs)     Types: Cigarettes     Start date: 02/2021     Passive exposure: Current    Smokeless tobacco: Never   Substance Use Topics    Alcohol use: Never    Drug use: Never       I personally reviewed all past medical, surgical, and social.     Review of Systems   Constitutional:  Negative for appetite change, fatigue, fever and unexpected weight change.   Respiratory:  Positive for cough and chest tightness. Negative for shortness of breath and wheezing.    Cardiovascular:  Negative for chest pain and leg swelling.   Gastrointestinal:  Negative for abdominal pain, constipation, diarrhea, nausea and vomiting.   Genitourinary:  Negative for difficulty urinating.   Musculoskeletal:  Positive for arthralgias and gait problem. Negative for  myalgias.   Skin:  Negative for color change and rash.   Neurological:  Negative for dizziness, syncope, weakness and headaches.      Medications:  Outpatient Encounter Medications as of 2/4/2025   Medication Sig Dispense Refill    amLODIPine (NORVASC) 5 MG tablet Take 1 tablet (5 mg total) by mouth once daily. for blood pressure 90 tablet 1    azithromycin (Z-GIUSEPPE) 250 MG tablet Take 2 tablets by mouth on day 1; Take 1 tablet by mouth on days 2-5 6 tablet 0    benzonatate (TESSALON) 100 MG capsule Take 1 capsule (100 mg total) by mouth 3 (three) times daily as needed for Cough. 30 capsule 0    blood pressure monitor Kit 1 Device by Misc.(Non-Drug; Combo Route) route 2 (two) times a day. 1 each 0    chlorthalidone (HYGROTEN) 25 MG Tab Take 1 tablet (25 mg total) by mouth once daily. for fluid or blood pressure 90 tablet 1    HYDROcodone-acetaminophen (NORCO)  mg per tablet Take 1 tablet by mouth every 6 (six) hours as needed for pain ... May cause drowsiness 28 tablet 0    ibandronate (BONIVA) 150 mg tablet Take 1 tablet (150 mg total) by mouth once daily. 1 tablet 11     No facility-administered encounter medications on file as of 2/4/2025.       Allergies:  Review of patient's allergies indicates:  No Known Allergies    Health Maintenance:    There is no immunization history on file for this patient.     Health Maintenance   Topic Date Due    TETANUS VACCINE  Never done    Shingles Vaccine (1 of 2) Never done    Pneumococcal Vaccines (Age 50+) (1 of 1 - PCV) Never done    RSV Vaccine (Age 60+ and Pregnant patients) (1 - 1-dose 75+ series) Never done    Lipid Panel  05/09/2024    COVID-19 Vaccine (1 - 2024-25 season) Never done    DEXA Scan  10/26/2025    Hepatitis C Screening  Completed    Influenza Vaccine  Addressed        Physical Exam      Vital Signs  Temp Source: Oral  Pulse: 79  Resp: 18  SpO2: 100 %  BP: 115/81  BP Location: Left arm  Patient Position: Sitting  Pain Score: 0-No pain  Height and  "Weight  Height: 5' 4" (162.6 cm)  Weight: 70 kg (154 lb 6.4 oz)  BSA (Calculated - sq m): 1.78 sq meters  BMI (Calculated): 26.5  Weight in (lb) to have BMI = 25: 145.3]    Physical Exam  Constitutional:       Appearance: Normal appearance.   HENT:      Head: Normocephalic.      Right Ear: Tympanic membrane, ear canal and external ear normal.      Left Ear: Tympanic membrane, ear canal and external ear normal.      Nose: Rhinorrhea present. No mucosal edema or congestion.      Right Turbinates: Not enlarged or swollen.      Left Turbinates: Not enlarged or swollen.      Right Sinus: No maxillary sinus tenderness or frontal sinus tenderness.      Left Sinus: No maxillary sinus tenderness or frontal sinus tenderness.      Mouth/Throat:      Mouth: Mucous membranes are moist.      Pharynx: Posterior oropharyngeal erythema present. No pharyngeal swelling or oropharyngeal exudate.   Cardiovascular:      Rate and Rhythm: Normal rate and regular rhythm.      Pulses: Normal pulses.      Heart sounds: Normal heart sounds.   Pulmonary:      Effort: Pulmonary effort is normal. No respiratory distress.      Breath sounds: Normal breath sounds. No wheezing, rhonchi or rales.   Musculoskeletal:         General: Normal range of motion.      Cervical back: Normal range of motion and neck supple.   Skin:     General: Skin is warm and dry.   Neurological:      Mental Status: She is alert and oriented to person, place, and time.        Laboratory:  CBC:  Recent Labs   Lab 06/05/24  0916 06/18/24  1144 06/19/24  0710   WBC 3.66 L 8.87 7.87   RBC 4.39 3.45 L 3.00 L   Hemoglobin 12.9 10.4 L 9.0 L   Hematocrit 41.6 32.4 L 27.8 L   Platelet Count 248 197 173   MCV 94.8 93.9 92.7   MCH 29.4 30.1 30.0   MCHC 31.0 L 32.1 32.4       LIPIDS:  Recent Labs   Lab 05/09/23  1143   HDL Cholesterol 78 H   Cholesterol 200   Triglycerides 87   LDL Calculated 105   Cholesterol/HDL Ratio (Risk Factor) 2.6   Non-       TSH:        A1C:        Lab " "Results   Component Value Date     (H) 06/19/2024     06/19/2024    K 3.4 (L) 06/19/2024     (H) 06/19/2024    CO2 23 06/19/2024    BUN 11 06/19/2024    CREATININE 0.63 06/19/2024    CALCIUM 7.8 (L) 06/19/2024    PROT 5.5 (L) 06/19/2024    ALBUMIN 2.7 (L) 06/19/2024    BILITOT 0.3 06/19/2024    ALKPHOS 57 06/19/2024    AST 19 06/19/2024    ALT 19 06/19/2024    ANIONGAP 9 06/19/2024       Lab Results   Component Value Date    WBC 7.87 06/19/2024    RBC 3.00 (L) 06/19/2024    HGB 9.0 (L) 06/19/2024    HCT 27.8 (L) 06/19/2024    MCV 92.7 06/19/2024    RDW 13.1 06/19/2024     06/19/2024        Lab Results   Component Value Date    CHOL 200 05/09/2023    CHOL 233 (A) 08/02/2019    TRIG 87 05/09/2023    TRIG 88 08/02/2019    HDL 78 (H) 05/09/2023    HDL 68 08/02/2019    LDLCALC 105 05/09/2023       No results found for: "TSH"    No results found for: "HGBA1C", "ESTIMATEDAVG"     No components found for: "VITAMIND"    No results found for: "PSA"    Point Of Care Testing:  Nitrites, UA   Date Value Ref Range Status   06/05/2024 Negative Negative Final     Urobilinogen, UA   Date Value Ref Range Status   06/05/2024 Normal 0.2, 1.0, Normal mg/dL Final     pH, UA   Date Value Ref Range Status   06/05/2024 7.0 5.0 to 8.0 pH Units Final     Specific Gravity, UA   Date Value Ref Range Status   06/05/2024 1.022 <=1.030 Final     Ketones, UA   Date Value Ref Range Status   06/05/2024 Negative Negative mg/dL Final       Lab Results   Component Value Date    EPEXYRU4KN Negative 04/14/2023    RAPFLUA Negative 04/14/2023    RAPFLUB Negative 04/14/2023         Assessment/Plan     1. Upper respiratory tract infection, unspecified type  -     azithromycin (Z-GIUSEPPE) 250 MG tablet; Take 2 tablets by mouth on day 1; Take 1 tablet by mouth on days 2-5  Dispense: 6 tablet; Refill: 0  -     benzonatate (TESSALON) 100 MG capsule; Take 1 capsule (100 mg total) by mouth 3 (three) times daily as needed for Cough.  Dispense: " 30 capsule; Refill: 0    2. Contact with and (suspected) exposure to covid-19  -     POCT COVID-19 Rapid Screening  -     POCT Influenza A/B Molecular  -     POCT Strep A, Molecular           Return to clinic if symptoms worsen or fail to improve.    Questions answered to desired level of satisfaction    Verbalized understanding to all information and instructions provided      ELLIOTT [facility]

## 2025-04-02 ENCOUNTER — TELEPHONE (OUTPATIENT)
Dept: OBSTETRICS AND GYNECOLOGY | Facility: CLINIC | Age: 75
End: 2025-04-02
Payer: MEDICARE

## 2025-04-02 NOTE — TELEPHONE ENCOUNTER
----- Message from Angus sent at 4/2/2025 11:01 AM CDT -----  Regarding: pt call back  Who Called: Brandee Kowalski is calling with some questions about a device for prolapse she states she has one now and she needs a new one and has to have a RX for it and she is wondering if dr talley can do that for her Preferred Method of Contact: Phone CallPatient's Preferred Phone Number on File: [phone] Best Call Back Number, if different:Additional Information:

## 2025-04-22 ENCOUNTER — OFFICE VISIT (OUTPATIENT)
Dept: OBSTETRICS AND GYNECOLOGY | Facility: CLINIC | Age: 75
End: 2025-04-22
Payer: MEDICARE

## 2025-04-22 VITALS
WEIGHT: 153.19 LBS | SYSTOLIC BLOOD PRESSURE: 137 MMHG | HEART RATE: 79 BPM | DIASTOLIC BLOOD PRESSURE: 73 MMHG | BODY MASS INDEX: 26.3 KG/M2

## 2025-04-22 DIAGNOSIS — M81.0 OSTEOPOROSIS, UNSPECIFIED OSTEOPOROSIS TYPE, UNSPECIFIED PATHOLOGICAL FRACTURE PRESENCE: ICD-10-CM

## 2025-04-22 DIAGNOSIS — Z01.419 ROUTINE GYNECOLOGICAL EXAMINATION: Primary | ICD-10-CM

## 2025-04-22 DIAGNOSIS — Z78.0 ASYMPTOMATIC MENOPAUSE: ICD-10-CM

## 2025-04-22 DIAGNOSIS — Z12.31 SCREENING MAMMOGRAM FOR BREAST CANCER: ICD-10-CM

## 2025-04-22 DIAGNOSIS — Z12.4 CERVICAL CANCER SCREENING: ICD-10-CM

## 2025-04-22 PROCEDURE — 88142 CYTOPATH C/V THIN LAYER: CPT | Mod: TC,GCY | Performed by: OBSTETRICS & GYNECOLOGY

## 2025-04-22 PROCEDURE — 1101F PT FALLS ASSESS-DOCD LE1/YR: CPT | Mod: CPTII,,, | Performed by: OBSTETRICS & GYNECOLOGY

## 2025-04-22 PROCEDURE — 87626 HPV SEP HI-RSK TYP&POOL RSLT: CPT | Mod: ,,, | Performed by: CLINICAL MEDICAL LABORATORY

## 2025-04-22 PROCEDURE — 99397 PER PM REEVAL EST PAT 65+ YR: CPT | Mod: ,,, | Performed by: OBSTETRICS & GYNECOLOGY

## 2025-04-22 PROCEDURE — 3075F SYST BP GE 130 - 139MM HG: CPT | Mod: CPTII,,, | Performed by: OBSTETRICS & GYNECOLOGY

## 2025-04-22 PROCEDURE — 3078F DIAST BP <80 MM HG: CPT | Mod: CPTII,,, | Performed by: OBSTETRICS & GYNECOLOGY

## 2025-04-22 PROCEDURE — 3288F FALL RISK ASSESSMENT DOCD: CPT | Mod: CPTII,,, | Performed by: OBSTETRICS & GYNECOLOGY

## 2025-04-22 PROCEDURE — 1159F MED LIST DOCD IN RCRD: CPT | Mod: CPTII,,, | Performed by: OBSTETRICS & GYNECOLOGY

## 2025-04-22 RX ORDER — IBANDRONATE SODIUM 150 MG/1
150 TABLET, FILM COATED ORAL
Qty: 1 TABLET | Refills: 11 | Status: SHIPPED | OUTPATIENT
Start: 2025-04-22 | End: 2026-04-22

## 2025-04-22 NOTE — PROGRESS NOTES
CC: Well woman exam    Brandee Contreras is a 74 y.o. female  presents for well woman exam.    LMP: No LMP recorded. Patient is postmenopausal..    Last mammogram: 10-11-22  Last Colonoscopy: 19  Last Bone Density:10-26-22    Denies any issues, problems, or complaints. Pt c/o pessary fitting.    Past Medical History:   Diagnosis Date    Arthritis     Hypertension     Nicotine dependence      Past Surgical History:   Procedure Laterality Date    JOINT REPLACEMENT Left 2023    LTHR     Dr. He Young    ROBOTIC ARTHROPLASTY, HIP Left 2023    Procedure: ROBOTIC ARTHROPLASTY,HIP;  Surgeon: He Young MD;  Location: [facility] OR;  Service: Orthopedics;  Laterality: Left;    ROBOTIC ARTHROPLASTY, HIP Right 2024    Procedure: ROBOTIC ARTHROPLASTY,HIP;  Surgeon: He Young MD;  Location: [facility] OR;  Service: Orthopedics;  Laterality: Right;    VAGINAL DELIVERY       Social History[1]  Family History   Problem Relation Name Age of Onset    Hypertension Maternal Grandmother       OB History          3    Para   3    Term   3            AB        Living             SAB        IAB        Ectopic        Multiple        Live Births                     /73   Pulse 79   Wt 69.5 kg (153 lb 3.2 oz)   BMI 26.30 kg/m²       ROS:  GENERAL: Denies weight gain or weight loss. Feeling well overall.   SKIN: Denies rash or lesions.   HEAD: Denies head injury or headache.   NODES: Denies enlarged lymph nodes.   CHEST: Denies chest pain or shortness of breath.   CARDIOVASCULAR: Denies palpitations or left sided chest pain.   ABDOMEN: No abdominal pain, constipation, diarrhea, nausea, vomiting or rectal bleeding.   URINARY: No frequency, dysuria, hematuria, or burning on urination.  REPRODUCTIVE: See HPI.   BREASTS: The patient performs breast self-examination and denies pain, lumps, or nipple discharge.   HEMATOLOGIC: No easy bruisability or excessive bleeding.    MUSCULOSKELETAL: Denies joint pain or swelling.   NEUROLOGIC: Denies syncope or weakness.   PSYCHIATRIC: Denies depression, anxiety or mood swings.    PHYSICAL EXAM:  APPEARANCE: Well nourished, well developed, in no acute distress.  AFFECT: WNL, alert and oriented x 3  SKIN: No acne or hirsutism  NECK: Neck symmetric without masses or thyromegaly  NODES: No inguinal, cervical, axillary, or femoral lymph node enlargement  CHEST: Good respiratory effect  ABDOMEN: Soft.  No tenderness or masses.  No hepatosplenomegaly.  No hernias.  BREASTS: Symmetrical, no skin changes or visible lesions.  No palpable masses, nipple discharge bilaterally.  PELVIC: Normal external genitalia without lesions.  Normal hair distribution.  Adequate perineal body, normal urethral meatus.  Vagina moist and well rugated without lesions or discharge.  Cervix pink, without lesions, discharge or tenderness.  No significant cystocele or rectocele.  Bimanual exam shows uterus to be normal size, regular, mobile and nontender.  Adnexa without masses or tenderness.    EXTREMITIES: No edema.    Routine gynecological examination  -     ThinPrep Pap Test; Future; Expected date: 04/22/2025  -     HPV DNA probe, amplified    Cervical cancer screening  -     ThinPrep Pap Test; Future; Expected date: 04/22/2025  -     HPV DNA probe, amplified    Screening mammogram for breast cancer  -     Mammo Digital Screening Bilat w/ Aleksandr (XPD); Future; Expected date: 04/22/2025    Asymptomatic menopause  -     DXA Bone Density Appendicular Skeleton; Future; Expected date: 10/22/2025    Osteoporosis, unspecified osteoporosis type, unspecified pathological fracture presence  -     ibandronate (BONIVA) 150 mg tablet; Take 1 tablet (150 mg total) by mouth every 30 days.  Dispense: 1 tablet; Refill: 11            Patient was counseled today on A.C.S. Pap guidelines and recommendations for yearly pelvic exams, mammograms and monthly self breast exams; to see her PCP for  other health maintenance.   Exercise regimen encouraged  Healthy food choices encouraged  Questions answered to desired level of satisfaction  Verbalized understanding to all information and instructions    Follow up in about 1 year (around 4/22/2026) for annual.      Dirk Garrison M.D., OG    OB/GYN                       [1]   Social History  Socioeconomic History    Marital status:    Tobacco Use    Smoking status: Former     Current packs/day: 0.25     Average packs/day: 0.2 packs/day for 4.2 years (1.1 ttl pk-yrs)     Types: Cigarettes     Start date: 02/2021     Passive exposure: Current    Smokeless tobacco: Never   Substance and Sexual Activity    Alcohol use: Never    Drug use: Never    Sexual activity: Yes     Social Drivers of Health     Financial Resource Strain: Low Risk  (6/18/2024)    Overall Financial Resource Strain (CARDIA)     Difficulty of Paying Living Expenses: Not hard at all   Food Insecurity: No Food Insecurity (6/18/2024)    Hunger Vital Sign     Worried About Running Out of Food in the Last Year: Never true     Ran Out of Food in the Last Year: Never true   Transportation Needs: No Transportation Needs (6/18/2024)    TRANSPORTATION NEEDS     Transportation : No   Physical Activity: Inactive (6/18/2024)    Exercise Vital Sign     Days of Exercise per Week: 0 days     Minutes of Exercise per Session: 0 min   Stress: No Stress Concern Present (6/18/2024)    [employer] [place] [employer] - Occupational Stress Questionnaire     Feeling of Stress : Not at all   Housing Stability: Unknown (6/18/2024)    Housing Stability Vital Sign     Unable to Pay for Housing in the Last Year: No     Homeless in the Last Year: No

## 2025-04-26 LAB
HPV 16: NEGATIVE
HPV 18: NEGATIVE
HPV OTHER: POSITIVE

## 2025-04-29 ENCOUNTER — HOSPITAL ENCOUNTER (OUTPATIENT)
Dept: RADIOLOGY | Facility: HOSPITAL | Age: 75
Discharge: HOME | End: 2025-04-29
Attending: OBSTETRICS & GYNECOLOGY
Payer: MEDICARE

## 2025-04-29 VITALS — HEIGHT: 64 IN | BODY MASS INDEX: 23.05 KG/M2 | WEIGHT: 135 LBS

## 2025-04-29 DIAGNOSIS — Z12.31 SCREENING MAMMOGRAM FOR BREAST CANCER: ICD-10-CM

## 2025-04-29 PROCEDURE — 77067 SCR MAMMO BI INCL CAD: CPT | Mod: 26,,, | Performed by: RADIOLOGY

## 2025-04-29 PROCEDURE — 77063 BREAST TOMOSYNTHESIS BI: CPT | Mod: 26,,, | Performed by: RADIOLOGY

## 2025-04-29 PROCEDURE — 77063 BREAST TOMOSYNTHESIS BI: CPT | Mod: TC

## 2025-06-05 ENCOUNTER — HOSPITAL ENCOUNTER (EMERGENCY)
Facility: HOSPITAL | Age: 75
Discharge: HOME | End: 2025-06-05
Payer: MEDICARE

## 2025-06-05 VITALS
RESPIRATION RATE: 17 BRPM | DIASTOLIC BLOOD PRESSURE: 63 MMHG | SYSTOLIC BLOOD PRESSURE: 133 MMHG | OXYGEN SATURATION: 100 % | HEART RATE: 67 BPM | HEIGHT: 64 IN | TEMPERATURE: 99 F | WEIGHT: 130 LBS | BODY MASS INDEX: 22.2 KG/M2

## 2025-06-05 DIAGNOSIS — R00.2 PALPITATIONS: Primary | ICD-10-CM

## 2025-06-05 DIAGNOSIS — E87.6 HYPOKALEMIA: ICD-10-CM

## 2025-06-05 DIAGNOSIS — R06.02 SHORTNESS OF BREATH: ICD-10-CM

## 2025-06-05 LAB
ALBUMIN SERPL BCP-MCNC: 3.8 G/DL (ref 3.4–4.8)
ALBUMIN/GLOB SERPL: 1.1 {RATIO}
ALP SERPL-CCNC: 62 U/L (ref 40–150)
ALT SERPL W P-5'-P-CCNC: 16 U/L
ANION GAP SERPL CALCULATED.3IONS-SCNC: 11 MMOL/L (ref 7–16)
AST SERPL W P-5'-P-CCNC: 25 U/L (ref 11–45)
BASOPHILS # BLD AUTO: 0.03 K/UL (ref 0–0.2)
BASOPHILS NFR BLD AUTO: 0.7 % (ref 0–1)
BILIRUB SERPL-MCNC: 0.3 MG/DL
BUN SERPL-MCNC: 8 MG/DL (ref 10–20)
BUN/CREAT SERPL: 11 (ref 6–20)
CALCIUM SERPL-MCNC: 9.1 MG/DL (ref 8.4–10.2)
CHLORIDE SERPL-SCNC: 100 MMOL/L (ref 98–107)
CO2 SERPL-SCNC: 29 MMOL/L (ref 23–31)
CREAT SERPL-MCNC: 0.75 MG/DL (ref 0.55–1.02)
D DIMER PPP FEU-MCNC: 0.7 ΜG/ML (ref 0–0.47)
DIFFERENTIAL METHOD BLD: ABNORMAL
EGFR (NO RACE VARIABLE) (RUSH/TITUS): 83 ML/MIN/1.73M2
EOSINOPHIL # BLD AUTO: 0.03 K/UL (ref 0–0.5)
EOSINOPHIL NFR BLD AUTO: 0.7 % (ref 1–4)
ERYTHROCYTE [DISTWIDTH] IN BLOOD BY AUTOMATED COUNT: 12.9 % (ref 11.5–14.5)
GLOBULIN SER-MCNC: 3.5 G/DL (ref 2–4)
GLUCOSE SERPL-MCNC: 103 MG/DL (ref 82–115)
HCT VFR BLD AUTO: 38.7 % (ref 38–47)
HGB BLD-MCNC: 12.9 G/DL (ref 12–16)
IMM GRANULOCYTES # BLD AUTO: 0.01 K/UL (ref 0–0.04)
IMM GRANULOCYTES NFR BLD: 0.2 % (ref 0–0.4)
LYMPHOCYTES # BLD AUTO: 1.24 K/UL (ref 1–4.8)
LYMPHOCYTES NFR BLD AUTO: 27.6 % (ref 27–41)
MAGNESIUM SERPL-MCNC: 2.2 MG/DL (ref 1.6–2.6)
MCH RBC QN AUTO: 30.4 PG (ref 27–31)
MCHC RBC AUTO-ENTMCNC: 33.3 G/DL (ref 32–36)
MCV RBC AUTO: 91.1 FL (ref 80–96)
MONOCYTES # BLD AUTO: 0.36 K/UL (ref 0–0.8)
MONOCYTES NFR BLD AUTO: 8 % (ref 2–6)
MPC BLD CALC-MCNC: 10.1 FL (ref 9.4–12.4)
NEUTROPHILS # BLD AUTO: 2.82 K/UL (ref 1.8–7.7)
NEUTROPHILS NFR BLD AUTO: 62.8 % (ref 53–65)
NRBC # BLD AUTO: 0 X10E3/UL
NRBC, AUTO (.00): 0 %
NT-PROBNP SERPL-MCNC: 99 PG/ML (ref 1–450)
PLATELET # BLD AUTO: 255 K/UL (ref 150–400)
POTASSIUM SERPL-SCNC: 2.5 MMOL/L (ref 3.5–5.1)
PROT SERPL-MCNC: 7.3 G/DL (ref 5.8–7.6)
RBC # BLD AUTO: 4.25 M/UL (ref 4.2–5.4)
SARS-COV-2 RDRP RESP QL NAA+PROBE: NEGATIVE
SODIUM SERPL-SCNC: 137 MMOL/L (ref 136–145)
TROPONIN I SERPL HS-MCNC: <2.7 NG/L
TSH SERPL DL<=0.005 MIU/L-ACNC: 0.42 UIU/ML (ref 0.35–4.94)
WBC # BLD AUTO: 4.49 K/UL (ref 4.5–11)

## 2025-06-05 PROCEDURE — 87635 SARS-COV-2 COVID-19 AMP PRB: CPT | Performed by: NURSE PRACTITIONER

## 2025-06-05 PROCEDURE — 80053 COMPREHEN METABOLIC PANEL: CPT | Performed by: NURSE PRACTITIONER

## 2025-06-05 PROCEDURE — 84443 ASSAY THYROID STIM HORMONE: CPT | Performed by: NURSE PRACTITIONER

## 2025-06-05 PROCEDURE — 36415 COLL VENOUS BLD VENIPUNCTURE: CPT | Performed by: NURSE PRACTITIONER

## 2025-06-05 PROCEDURE — 96365 THER/PROPH/DIAG IV INF INIT: CPT

## 2025-06-05 PROCEDURE — 85379 FIBRIN DEGRADATION QUANT: CPT | Performed by: NURSE PRACTITIONER

## 2025-06-05 PROCEDURE — 63600175 PHARM REV CODE 636 W HCPCS: Performed by: NURSE PRACTITIONER

## 2025-06-05 PROCEDURE — 99285 EMERGENCY DEPT VISIT HI MDM: CPT | Mod: 25

## 2025-06-05 PROCEDURE — 83880 ASSAY OF NATRIURETIC PEPTIDE: CPT | Performed by: NURSE PRACTITIONER

## 2025-06-05 PROCEDURE — 84484 ASSAY OF TROPONIN QUANT: CPT | Performed by: NURSE PRACTITIONER

## 2025-06-05 PROCEDURE — 93005 ELECTROCARDIOGRAM TRACING: CPT

## 2025-06-05 PROCEDURE — 93010 ELECTROCARDIOGRAM REPORT: CPT | Mod: ,,, | Performed by: INTERNAL MEDICINE

## 2025-06-05 PROCEDURE — 83735 ASSAY OF MAGNESIUM: CPT | Performed by: NURSE PRACTITIONER

## 2025-06-05 PROCEDURE — 25000003 PHARM REV CODE 250: Performed by: NURSE PRACTITIONER

## 2025-06-05 PROCEDURE — 85025 COMPLETE CBC W/AUTO DIFF WBC: CPT | Performed by: NURSE PRACTITIONER

## 2025-06-05 RX ORDER — POTASSIUM CHLORIDE 7.45 MG/ML
10 INJECTION INTRAVENOUS
Status: COMPLETED | OUTPATIENT
Start: 2025-06-05 | End: 2025-06-05

## 2025-06-05 RX ORDER — SODIUM CHLORIDE 9 MG/ML
500 INJECTION, SOLUTION INTRAVENOUS
Status: COMPLETED | OUTPATIENT
Start: 2025-06-05 | End: 2025-06-05

## 2025-06-05 RX ORDER — POTASSIUM CHLORIDE 20 MEQ/1
40 TABLET, EXTENDED RELEASE ORAL
Status: COMPLETED | OUTPATIENT
Start: 2025-06-05 | End: 2025-06-05

## 2025-06-05 RX ADMIN — POTASSIUM CHLORIDE 40 MEQ: 1500 TABLET, EXTENDED RELEASE ORAL at 05:06

## 2025-06-05 RX ADMIN — POTASSIUM CHLORIDE 40 MEQ: 1500 TABLET, EXTENDED RELEASE ORAL at 03:06

## 2025-06-05 RX ADMIN — POTASSIUM CHLORIDE 10 MEQ: 7.46 INJECTION, SOLUTION INTRAVENOUS at 03:06

## 2025-06-05 RX ADMIN — SODIUM CHLORIDE 500 ML: 9 INJECTION, SOLUTION INTRAVENOUS at 03:06

## 2025-06-05 NOTE — DISCHARGE INSTRUCTIONS
Follow up with PCP in 48-72 hours for repeat labs. Return to ED if any new or worsening of symptoms occur.

## 2025-06-05 NOTE — ED PROVIDER NOTES
Encounter Date: 6/5/2025       History     Chief Complaint   Patient presents with    Palpitations     Pt presents to ED complaining of palpitations that have been chronically ongoing but have worsened over the last day. Reports having been referred to cardiology in the past but unable to make appt.      74-year-old female presents to ED with complaint of palpitations and shortness of breathe.  Patient reports the palpitations have been intermittent and reports having a cardiology referral in the past that she was unable to attend appointment.  Patient states that she experienced her heart racing and beating funny on this morning with associated shortness of breathe.  Denies chest pain, fever, chills, cough.  Patient reports that she is currently on a medication that requires her to take on an empty stomach and sit upright for 1 hour (Boniva).  Patient states that she did not take her blood pressure medicine this morning.      The history is provided by the patient and medical records.     Review of patient's allergies indicates:  No Known Allergies  Past Medical History:   Diagnosis Date    Arthritis     Hypertension     Nicotine dependence      Past Surgical History:   Procedure Laterality Date    JOINT REPLACEMENT Left 11/14/2023    LTHR     Dr. He Young    ROBOTIC ARTHROPLASTY, HIP Left 11/14/2023    Procedure: ROBOTIC ARTHROPLASTY,HIP;  Surgeon: He Young MD;  Location: [facility];  Service: Orthopedics;  Laterality: Left;    ROBOTIC ARTHROPLASTY, HIP Right 6/18/2024    Procedure: ROBOTIC ARTHROPLASTY,HIP;  Surgeon: He Young MD;  Location: [facility];  Service: Orthopedics;  Laterality: Right;    VAGINAL DELIVERY       Family History   Problem Relation Name Age of Onset    Hypertension Maternal Grandmother       Social History[1]  Review of Systems   Constitutional:  Negative for chills and fever.   Eyes:  Negative for photophobia and visual disturbance.   Respiratory:   Positive for shortness of breath. Negative for cough.    Cardiovascular:  Positive for palpitations. Negative for chest pain.   Gastrointestinal:  Negative for rectal pain and vomiting.   Genitourinary:  Negative for dysuria and urgency.   Musculoskeletal:  Negative for arthralgias and gait problem.   Skin:  Negative for color change and wound.   Neurological:  Negative for dizziness and weakness.   Hematological:  Negative for adenopathy. Does not bruise/bleed easily.   Psychiatric/Behavioral:  Negative for agitation and confusion.    All other systems reviewed and are negative.      Physical Exam     Initial Vitals [06/05/25 1321]   BP Pulse Resp Temp SpO2   (!) 148/78 78 12 98.9 °F (37.2 °C) 100 %      MAP       --         Physical Exam    Nursing note and vitals reviewed.  Constitutional: She appears well-developed and well-nourished.   HENT:   Head: Normocephalic and atraumatic.   Eyes: EOM are normal. Pupils are equal, round, and reactive to light.   Neck: Neck supple.   Normal range of motion.  Cardiovascular:  Normal rate and regular rhythm.           No murmur heard.  Pulmonary/Chest: She has no wheezes. She has no rhonchi.   Abdominal: Abdomen is soft. She exhibits no distension. There is no abdominal tenderness.   Musculoskeletal:         General: No tenderness or edema.      Cervical back: Normal range of motion and neck supple.     Lymphadenopathy:     She has no cervical adenopathy.   Neurological: She is alert and oriented to person, place, and time. No cranial nerve deficit or sensory deficit.   Skin: Skin is warm and dry. Capillary refill takes less than 2 seconds.   Psychiatric: She has a normal mood and affect. Thought content normal.         Medical Screening Exam   See Full Note    ED Course   Procedures  Labs Reviewed   COMPREHENSIVE METABOLIC PANEL - Abnormal       Result Value    Sodium 137      Potassium 2.5 (*)     Chloride 100      CO2 29      Anion Gap 11      Glucose 103      BUN 8 (*)      Creatinine 0.75      BUN/Creatinine Ratio 11      Calcium 9.1      Total Protein 7.3      Albumin 3.8      Globulin 3.5      A/G Ratio 1.1      Bilirubin, Total 0.3      Alk Phos 62      ALT 16      AST 25      eGFR 83     D DIMER, QUANTITATIVE - Abnormal    D-Dimer 0.70 (*)    CBC WITH DIFFERENTIAL - Abnormal    WBC 4.49 (*)     RBC 4.25      Hemoglobin 12.9      Hematocrit 38.7      MCV 91.1      MCH 30.4      MCHC 33.3      RDW 12.9      Platelet Count 255      MPV 10.1      Neutrophils % 62.8      Lymphocytes % 27.6      Monocytes % 8.0 (*)     Eosinophils % 0.7 (*)     Basophils % 0.7      Immature Granulocytes % 0.2      nRBC, Auto 0.0      Neutrophils, Abs 2.82      Lymphocytes, Absolute 1.24      Monocytes, Absolute 0.36      Eosinophils, Absolute 0.03      Basophils, Absolute 0.03      Immature Granulocytes, Absolute 0.01      nRBC, Absolute 0.00      Diff Type Auto     SARS-COV-2 RNA AMPLIFICATION, QUAL - Normal    SARS COV-2 Molecular Negative      Narrative:     Negative SARS-CoV results should not be used as the sole basis for treatment or patient management decisions; negative results should be considered in the context of a patient's recent exposures, history and the presene of clinical signs and symptoms consistent with COVID-19.  Negative results should be treated as presumptive and confirmed by molecular assay, if necessary for patient management.   TROPONIN I - Normal    Troponin I High Sensitivity <2.7     NT-PRO NATRIURETIC PEPTIDE - Normal    ProBNP 99     TSH - Normal    TSH 0.419     MAGNESIUM - Normal    Magnesium 2.2     CBC W/ AUTO DIFFERENTIAL    Narrative:     The following orders were created for panel order CBC Auto Differential.  Procedure                               Abnormality         Status                     ---------                               -----------         ------                     CBC with Differential[[id number]]       Abnormal            Final result                  Please view results for these tests on the individual orders.          Imaging Results              X-Ray Chest 1 View (Final result)  Result time 06/05/25 14:13:28      Final result by Julius Fernández MD (06/05/25 14:13:28)                   Impression:      1. Chronic appearing interstitial findings, no large focal consolidation.      Electronically signed by: Julius Fernández MD  Date:    06/05/2025  Time:    14:13               Narrative:    EXAMINATION:  XR CHEST 1 VIEW    CLINICAL HISTORY:  shortness of breath;    TECHNIQUE:  Single frontal view of the chest was performed.    COMPARISON:  06/05/2024    FINDINGS:  The cardiomediastinal silhouette is not enlarged.  There is no pleural effusion.  The trachea is midline.  The lungs are symmetrically expanded bilaterally with mildly coarse interstitial attenuation, accentuated by habitus.  There is bilateral basilar subsegmental atelectasis..  No large focal consolidation seen.  There is no pneumothorax.  The osseous structures are remarkable for levo scoliotic curvature of the spine..                                       Medications   potassium chloride SA CR tablet 40 mEq (40 mEq Oral Given 6/5/25 1508)   potassium chloride 10 mEq in 100 mL IVPB (0 mEq Intravenous Stopped 6/5/25 1638)   0.9% NaCl infusion (0 mLs Intravenous Stopped 6/5/25 1638)   potassium chloride SA CR tablet 40 mEq (40 mEq Oral Given 6/5/25 1540)   potassium chloride SA CR tablet 40 mEq (40 mEq Oral Given 6/5/25 1701)     Medical Decision Making  74-year-old female presents to ED with complaint of palpitations and shortness of breathe.  Patient reports the palpitations have been intermittent and reports having a cardiology referral in the past that she was unable to attend appointment.  Patient states that she experienced her heart racing and beating funny on this morning with associated shortness of breathe.  Denies chest pain, fever, chills, cough.  Patient reports that she is  currently on a medication that requires her to take on an empty stomach and sit upright for 1 hour (Boniva).  Patient states that she did not take her blood pressure medicine this morning.      Labs, diagnostics ordered and reviewed  Radiologist's interpretation review    EKG ordered and reviewed   EKG significant for no ST-elevation   Rate 80  Rhythm sinus arrhythmia; widespread ST abnormality  Interpreted by ED physician    IV/PO Potassium administered  Cardiologist referral placed     Amount and/or Complexity of Data Reviewed  Labs: ordered.  Radiology: ordered. Decision-making details documented in ED Course.    Risk  Prescription drug management.               ED Course as of 06/05/25 2351   Thu Jun 05, 2025   1424 X-Ray Chest 1 View [FREDDIE]      ED Course User Index  [FREDDIE] Doreen Sabillon FNP                           Clinical Impression:   Final diagnoses:  [R06.02] Shortness of breath  [R00.2] Palpitations (Primary)  [E87.6] Hypokalemia        ED Disposition Condition    Discharge Stable          ED Prescriptions    None       Follow-up Information    None            [1]   Social History  Tobacco Use    Smoking status: Former     Current packs/day: 0.25     Average packs/day: 0.3 packs/day for 4.3 years (1.1 ttl pk-yrs)     Types: Cigarettes     Start date: 02/2021     Passive exposure: Current    Smokeless tobacco: Never   Substance Use Topics    Alcohol use: Never    Drug use: Never        Doreen Sabillon FNP  06/05/25 4921

## 2025-06-08 LAB
OHS QRS DURATION: 82 MS
OHS QTC CALCULATION: 435 MS

## 2025-06-16 DIAGNOSIS — I10 HYPERTENSION, UNSPECIFIED TYPE: ICD-10-CM

## 2025-06-16 RX ORDER — AMLODIPINE BESYLATE 5 MG/1
5 TABLET ORAL DAILY
Qty: 90 TABLET | Refills: 1 | OUTPATIENT
Start: 2025-06-16

## 2025-06-24 ENCOUNTER — OFFICE VISIT (OUTPATIENT)
Dept: CARDIOLOGY | Facility: CLINIC | Age: 75
End: 2025-06-24
Payer: MEDICARE

## 2025-06-24 VITALS
SYSTOLIC BLOOD PRESSURE: 110 MMHG | DIASTOLIC BLOOD PRESSURE: 62 MMHG | HEIGHT: 64 IN | HEART RATE: 73 BPM | WEIGHT: 152.38 LBS | BODY MASS INDEX: 26.02 KG/M2 | OXYGEN SATURATION: 99 %

## 2025-06-24 DIAGNOSIS — R00.2 PALPITATIONS: Primary | ICD-10-CM

## 2025-06-24 DIAGNOSIS — I10 HYPERTENSION, UNSPECIFIED TYPE: ICD-10-CM

## 2025-06-24 PROCEDURE — 1126F AMNT PAIN NOTED NONE PRSNT: CPT | Mod: CPTII,,, | Performed by: HOSPITALIST

## 2025-06-24 PROCEDURE — 3074F SYST BP LT 130 MM HG: CPT | Mod: CPTII,,, | Performed by: HOSPITALIST

## 2025-06-24 PROCEDURE — 1159F MED LIST DOCD IN RCRD: CPT | Mod: CPTII,,, | Performed by: HOSPITALIST

## 2025-06-24 PROCEDURE — 1101F PT FALLS ASSESS-DOCD LE1/YR: CPT | Mod: CPTII,,, | Performed by: HOSPITALIST

## 2025-06-24 PROCEDURE — 99204 OFFICE O/P NEW MOD 45 MIN: CPT | Mod: S$PBB,,, | Performed by: HOSPITALIST

## 2025-06-24 PROCEDURE — 3078F DIAST BP <80 MM HG: CPT | Mod: CPTII,,, | Performed by: HOSPITALIST

## 2025-06-24 PROCEDURE — 99214 OFFICE O/P EST MOD 30 MIN: CPT | Mod: PBBFAC | Performed by: HOSPITALIST

## 2025-06-24 PROCEDURE — 3288F FALL RISK ASSESSMENT DOCD: CPT | Mod: CPTII,,, | Performed by: HOSPITALIST

## 2025-06-24 PROCEDURE — 99999 PR PBB SHADOW E&M-EST. PATIENT-LVL IV: CPT | Mod: PBBFAC,,, | Performed by: HOSPITALIST

## 2025-06-24 RX ORDER — AMLODIPINE BESYLATE 5 MG/1
5 TABLET ORAL DAILY
Qty: 90 TABLET | Refills: 3 | Status: SHIPPED | OUTPATIENT
Start: 2025-06-24 | End: 2026-06-19

## 2025-06-24 RX ORDER — ACETAMINOPHEN 500 MG
1 TABLET ORAL 2 TIMES DAILY
Qty: 1 EACH | Refills: 0 | Status: SHIPPED | OUTPATIENT
Start: 2025-06-24

## 2025-06-24 NOTE — PROGRESS NOTES
"CARDIOVASCULAR CONSULTATION        REASON FOR CONSULT:   Brandee Contreras is a 74 y.o. female who presents for   Chief Complaint   Patient presents with    Palpitations     Palpitations.  Has been going on and off for "awhile" now. Feels these with and without exertion          HISTORY OF PRESENT ILLNESS, REVIEW OF SYSTEMS:   74 y.o. female who  has a past medical history of Arthritis, Hypertension, and Nicotine dependence.    Today we discussed the patient's cardiac symptoms at length.     History of Present Illness    CHIEF COMPLAINT:  Patient presents today for palpitations.    PALPITATIONS:  She experienced palpitations 3 weeks ago prompting an ED visit. She describes palpitations as feeling like a "somersault" or "flip" that can occur while sitting at rest. Symptoms have notably decreased since the ER visit and she denies frequent occurrence currently. She occasionally notes awareness of palpitations but reports minimal cardiac disturbance at this time. ECG from the ED was normal.    HYPOKALEMIA:  During the ER visit, she was found to have a low potassium of 2.5, which may have contributed to her cardiac symptoms.    HYPERTENSION:  She reports currently taking amlodipine 5 mg and chlorthalidone for BP management. Her medications have recently run out and were not being refilled by previous healthcare providers. She has a past history of being on Losartan. She denies any current symptoms related to BP medication and understands that her previous ER visit noted a potential relationship between BP medication and potassium levels. She is interested in continuing BP management.    SLEEP:  She reports experiencing a humming noise when falling asleep while watching movies, specifically occurring only when sleeping on the couch or recliner. This symptom does not happen when sleeping in bed. She acknowledges the noise may be related to her sleeping position.      ROS:  General: -fever, -chills, -fatigue, -weight gain, " -weight loss  Eyes: -vision changes, -redness, -discharge  ENT: -ear pain, -nasal congestion, -sore throat  Cardiovascular: -chest pain, +palpitations, -lower extremity edema  Respiratory: -cough, -shortness of breath, +stridor  Gastrointestinal: -abdominal pain, -nausea, -vomiting, -diarrhea, -constipation, -blood in stool  Genitourinary: -dysuria, -hematuria, -frequency  Musculoskeletal: -joint pain, -muscle pain  Skin: -rash, -lesion  Neurological: -headache, -dizziness, -numbness, -tingling  Psychiatric: -anxiety, -depression, -sleep difficulty           Cardiology focused 12-point ROS otherwise unremarkable except for as mentioned above in HPI/transcript and below in assessment/plan.   Pertinent Positives and Negatives documented herein.         PAST MEDICAL HISTORY:     Past Medical History:   Diagnosis Date    Arthritis     Hypertension     Nicotine dependence        PAST SURGICAL HISTORY:     Past Surgical History:   Procedure Laterality Date    JOINT REPLACEMENT Left 11/14/2023    LTHR     Dr. He Young    ROBOTIC ARTHROPLASTY, HIP Left 11/14/2023    Procedure: ROBOTIC ARTHROPLASTY,HIP;  Surgeon: He Young MD;  Location: [facility];  Service: Orthopedics;  Laterality: Left;    ROBOTIC ARTHROPLASTY, HIP Right 6/18/2024    Procedure: ROBOTIC ARTHROPLASTY,HIP;  Surgeon: He Young MD;  Location: [facility];  Service: Orthopedics;  Laterality: Right;    VAGINAL DELIVERY         ALLERGIES AND MEDICATION:   Review of patient's allergies indicates:  No Known Allergies     Medication List            Accurate as of June 24, 2025  3:56 PM. If you have any questions, ask your nurse or doctor.                CONTINUE taking these medications      amLODIPine 5 MG tablet  Commonly known as: NORVASC  Take 1 tablet (5 mg total) by mouth once daily. for blood pressure     blood pressure monitor Kit  1 Device by Misc.(Non-Drug; Combo Route) route 2 (two) times a day.     chlorthalidone 25 MG  "Tab  Commonly known as: HYGROTEN  Take 1 tablet (25 mg total) by mouth once daily. for fluid or blood pressure     HYDROcodone-acetaminophen  mg per tablet  Commonly known as: NORCO  Take 1 tablet by mouth every 6 (six) hours as needed for pain ... May cause drowsiness     * ibandronate 150 mg tablet  Commonly known as: BONIVA  Take 1 tablet (150 mg total) by mouth once daily.     * ibandronate 150 mg tablet  Commonly known as: BONIVA  Take 1 tablet (150 mg total) by mouth every 30 days.           * This list has 2 medication(s) that are the same as other medications prescribed for you. Read the directions carefully, and ask your doctor or other care provider to review them with you.                   Where to Get Your Medications        Information about where to get these medications is not yet available    Ask your nurse or doctor about these medications  blood pressure monitor Kit         SOCIAL HISTORY:   Social History[1]    FAMILY HISTORY:     Family History   Problem Relation Name Age of Onset    Hypertension Maternal Grandmother                    PHYSICAL EXAM:     Vitals:    06/24/25 1355   BP: 110/62   Pulse: 73    Body mass index is 26.16 kg/m².  Weight: 69.1 kg (152 lb 6.4 oz)   Height: 5' 4" (162.6 cm)     Gen: NAD  HEENT: NC/AT, MMM  Chest: normal wob  CV: RRR, no m/g/r  Ext: mild/trace edema of BLEs  Skin: no rash  Psych: [facility]  Neuro: [facility]      DATA:     Laboratory:  CBC:  Recent Labs   Lab 06/18/24  1144 06/19/24  0710 06/05/25  1358   WBC 8.87 7.87 4.49 L   Hemoglobin 10.4 L 9.0 L 12.9   Hematocrit 32.4 L 27.8 L 38.7   Platelet Count 197 173 255       CHEMISTRIES:  Recent Labs   Lab 11/15/23  0330 06/05/24  0916 06/18/24  1144 06/19/24  0710 06/05/25  1358   Glucose 99   < > 130 H 123 H 103   Sodium 140   < > 141 139 137   Potassium 3.9   < > 4.2 3.4 L 2.5 LL   BUN 13   < > 10 11 8 L   Creatinine 0.59   < > 0.65 0.63 0.75   Calcium 8.0 L   < > 7.5 L 7.8 L 9.1   Magnesium 2.1  --   --   --  " "2.2    < > = values in this interval not displayed.       CARDIAC BIOMARKERS:      No results found for: "BNP"    COAGS:  Recent Labs   Lab 11/01/23  0800 06/05/24  0916   INR 0.97 0.97       LIPIDS/LFTS:  Recent Labs   Lab 05/09/23  1143 11/01/23  0800 06/05/24  0916 06/19/24  0710 06/05/25  1358   Cholesterol 200  --   --   --   --    Triglycerides 87  --   --   --   --    HDL Cholesterol 78 H  --   --   --   --    LDL Calculated 105  --   --   --   --    Non-  --   --   --   --    AST 16   < > 22 19 25   ALT 17   < > 21 19 16    < > = values in this interval not displayed.       No results found for: "HGBA1C"    TSH  Recent Labs   Lab 06/05/25  1358   TSH 0.419       The 10-year ASCVD risk score (Cate SPENCER, et al., 2019) is: 28.1%    Values used to calculate the score:      Age: 74 years      Sex: Female      Is Non- : Yes      Diabetic: No      Tobacco smoker: Yes      Systolic Blood Pressure: 110 mmHg      Is BP treated: Yes      HDL Cholesterol: 78 mg/dL      Total Cholesterol: 200 mg/dL     IMAGING  No orders to display       ASSESSMENT AND PLAN   Narrative:  Assessment & Plan    R00.2 Palpitations  I10 Hypertension, unspecified type    IMPRESSION:  - Assessed history of recent ER visit for low potassium (2.5).  - PVCs likely cause reported heart "flips".  - Discontinued chlorthalidone due to normal blood pressure readings and risk of electrolyte imbalance.    PALPITATIONS:  - Explained nature of PVCs and their potential impact on EF.  - Discussed relationship between low potassium levels and cardiac arrhythmias.  - Patient to be mindful of sleeping position, especially when napping on couches or recliners.    HYPERTENSION, UNSPECIFIED TYPE:  - Patient to obtain a home blood pressure monitor, check blood pressure daily around lunchtime.  - Continued amlodipine 5 mg daily.  - Ordered previously prescribed blood pressure monitoring kit.  - Follow up with blood pressure readings " from home to guide further medication management.                     This note was dictated with the help of speech recognition software.  There might be un-intended errors and/or substitutions.      This note was generated with the assistance of ambient listening technology. Verbal consent was obtained by the patient and accompanying visitor(s) for the recording of patient appointment to facilitate this note. I attest to having reviewed and edited the generated note for accuracy, though some syntax or spelling errors may persist. Please contact the author of this note for any clarification.                 [1]   Social History  Socioeconomic History    Marital status:    Tobacco Use    Smoking status: Some Days     Current packs/day: 0.25     Average packs/day: 0.3 packs/day for 4.4 years (1.1 ttl pk-yrs)     Types: Cigarettes     Start date: 02/2021     Passive exposure: Current    Smokeless tobacco: Never   Substance and Sexual Activity    Alcohol use: Never    Drug use: Never    Sexual activity: Yes     Social Drivers of Health     Financial Resource Strain: Low Risk  (6/18/2024)    Overall Financial Resource Strain (CARDIA)     Difficulty of Paying Living Expenses: Not hard at all   Food Insecurity: No Food Insecurity (6/18/2024)    Hunger Vital Sign     Worried About Running Out of Food in the Last Year: Never true     Ran Out of Food in the Last Year: Never true   Transportation Needs: No Transportation Needs (6/18/2024)    TRANSPORTATION NEEDS     Transportation : No   Physical Activity: Inactive (6/18/2024)    Exercise Vital Sign     Days of Exercise per Week: 0 days     Minutes of Exercise per Session: 0 min   Stress: No Stress Concern Present (6/18/2024)    [employer] [place] [employer] - Occupational Stress Questionnaire     Feeling of Stress : Not at all   Housing Stability: Unknown (6/18/2024)    Housing Stability Vital Sign     Unable to Pay for Housing in the Last Year: No      Homeless in the Last Year: No

## 2025-07-24 DIAGNOSIS — I10 HYPERTENSION, UNSPECIFIED TYPE: ICD-10-CM

## 2025-07-24 RX ORDER — AMLODIPINE BESYLATE 5 MG/1
5 TABLET ORAL DAILY
Qty: 90 TABLET | Refills: 1 | Status: CANCELLED | OUTPATIENT
Start: 2025-07-24

## 2025-07-25 RX ORDER — CHLORTHALIDONE 25 MG/1
25 TABLET ORAL DAILY
Qty: 90 TABLET | Refills: 1 | Status: SHIPPED | OUTPATIENT
Start: 2025-07-25

## 2025-07-25 RX ORDER — AMLODIPINE BESYLATE 5 MG/1
5 TABLET ORAL DAILY
Qty: 90 TABLET | Refills: 1 | OUTPATIENT
Start: 2025-07-25

## 2025-08-12 ENCOUNTER — OFFICE VISIT (OUTPATIENT)
Dept: FAMILY MEDICINE | Facility: CLINIC | Age: 75
End: 2025-08-12
Payer: MEDICARE

## 2025-08-12 VITALS
HEART RATE: 76 BPM | OXYGEN SATURATION: 97 % | RESPIRATION RATE: 18 BRPM | TEMPERATURE: 98 F | HEIGHT: 64 IN | SYSTOLIC BLOOD PRESSURE: 123 MMHG | DIASTOLIC BLOOD PRESSURE: 77 MMHG | BODY MASS INDEX: 26.8 KG/M2 | WEIGHT: 157 LBS

## 2025-08-12 DIAGNOSIS — E87.6 HYPOKALEMIA DUE TO EXCESSIVE RENAL LOSS OF POTASSIUM: ICD-10-CM

## 2025-08-12 DIAGNOSIS — I10 HYPERTENSION, UNSPECIFIED TYPE: Primary | ICD-10-CM

## 2025-08-12 DIAGNOSIS — Q78.2 OSTEOPETROSIS: ICD-10-CM

## 2025-08-12 RX ORDER — POTASSIUM CHLORIDE 20 MEQ/1
20 TABLET, EXTENDED RELEASE ORAL DAILY
Qty: 90 TABLET | Refills: 0 | Status: SHIPPED | OUTPATIENT
Start: 2025-08-12

## 2025-08-12 RX ORDER — ALENDRONATE SODIUM 70 MG/1
70 TABLET ORAL
Qty: 4 TABLET | Refills: 11 | Status: SHIPPED | OUTPATIENT
Start: 2025-08-12 | End: 2026-08-12

## 2025-08-13 PROBLEM — Q78.2 OSTEOPETROSIS: Status: ACTIVE | Noted: 2025-08-13

## (undated) DEVICE — TUBE SUCTION KAMVAC MINI 20/BX

## (undated) DEVICE — Device

## (undated) DEVICE — GLOVE SENSICARE PI SURG 8.5

## (undated) DEVICE — PIN BONE 4 X 140MM STERILE
Type: IMPLANTABLE DEVICE | Site: HIP | Status: NON-FUNCTIONAL
Removed: 2024-06-18

## (undated) DEVICE — SUT TICRON #5

## (undated) DEVICE — SPONGE LAP 18X18 PREWASHED

## (undated) DEVICE — GLOVE SENSICARE PI SURG 7

## (undated) DEVICE — GLOVE 7.0 PROTEXIS PI BLUE

## (undated) DEVICE — APPLICATOR CHLORAPREP ORN 26ML

## (undated) DEVICE — KIT CHECKPOINT TIBIAL

## (undated) DEVICE — SHEET HIP ABSORB CIRC ELAS 8

## (undated) DEVICE — KIT TRACKING VIZADISC HIP

## (undated) DEVICE — STAPLER SKIN WIDE

## (undated) DEVICE — CHECKPOINT IMPACT 3.5MM HEX ST

## (undated) DEVICE — POUCH INSTRUMENT 2 POCKET

## (undated) DEVICE — GLOVE SENSICARE PI SURG 7.5

## (undated) DEVICE — KIT IRR SUCTION HND PIECE

## (undated) DEVICE — GLOVE SENSICARE PI GRN 7

## (undated) DEVICE — GLOVE SENSICARE PI GRN 8.5

## (undated) DEVICE — DRAPE U SPLIT SHEET 54X76IN

## (undated) DEVICE — PAD ABDOMINAL 8X7.5 STERILE

## (undated) DEVICE — SOL NACL IRR 3000ML

## (undated) DEVICE — SUT 2-0 VICRYL / CT-1

## (undated) DEVICE — TOWEL OR DISP STRL BLUE 4/PK

## (undated) DEVICE — NEEDL1/2 CIRCLE TROCAR PIINT MAYO CATGUT .056X1.95 STERILE

## (undated) DEVICE — DRAPE INCISE IOBAN 2 23X23IN

## (undated) DEVICE — PACK ECLIPSE BASIC III SURG

## (undated) DEVICE — PILLOW ABDUCTION MED

## (undated) DEVICE — GLOVE 8.5 PROTEXIS PI BLUE

## (undated) DEVICE — GLOVE 7.5 PROTEXIS PI BLUE

## (undated) DEVICE — SUT VICRYL CTD 1 27IN CP

## (undated) DEVICE — DRESSING AQUACEL FOAM RECT 6X6

## (undated) DEVICE — SPONGE LAP MASTER 12X12IN

## (undated) DEVICE — SOLIDIFIER BTL W/TREAT 1500CC

## (undated) DEVICE — SPONGE COTTON TRAY 4X4IN

## (undated) DEVICE — SOL NACL IRR 1000ML BTL

## (undated) DEVICE — KIT DRAPE RIO ONE PIECE W/POCK

## (undated) DEVICE — GLOVE SENSICARE PI GRN 6.5

## (undated) DEVICE — OVERLAY MATTRESS WAFFLE

## (undated) DEVICE — SUT BLU BR 2 TAPERD NDL 1/2

## (undated) DEVICE — BLADE SAW SAG DP 4.72X25.40X89

## (undated) DEVICE — GLOVE BIOGEL ECLIPSE SZ 6.5

## (undated) DEVICE — SOL IRRI STRL WATER 1000ML

## (undated) DEVICE — GLOVE SENSICARE PI GRN 7.5

## (undated) DEVICE — DRESSING MEPILEX SACR 16X20CM

## (undated) DEVICE — DRILL BIT 4X40MM

## (undated) DEVICE — CANISTER SUCTION MEDI-VAC 12L

## (undated) DEVICE — BLADE SURG CARBON STEEL #10

## (undated) DEVICE — DRAPE THREE-QTR REINF 53X77IN

## (undated) DEVICE — CANISTER SUCTION JUMBO 12L

## (undated) DEVICE — GLOVE BIOGEL ECLIPSE SZ 7.5

## (undated) DEVICE — DRESSING MEPILEX HEEL 22X23CM

## (undated) DEVICE — PENCIL ELECTROSURG HOLST W/BLD

## (undated) DEVICE — GOWN TOGA SYS PEELWY ZIP 2 XL

## (undated) DEVICE — PIN BONE 4 X 140MM STERILE
Type: IMPLANTABLE DEVICE | Site: HIP | Status: NON-FUNCTIONAL
Removed: 2023-11-14

## (undated) DEVICE — GLOVE 6.5 PROTEXIS PI BLUE

## (undated) DEVICE — GLOVE BIOGEL SKINSENSE PI 8.5

## (undated) DEVICE — GLOVE BIOGEL ECLIPSE SZ 7